# Patient Record
Sex: MALE | Race: WHITE | NOT HISPANIC OR LATINO | Employment: OTHER | URBAN - NONMETROPOLITAN AREA
[De-identification: names, ages, dates, MRNs, and addresses within clinical notes are randomized per-mention and may not be internally consistent; named-entity substitution may affect disease eponyms.]

---

## 2017-01-11 ENCOUNTER — OFFICE VISIT (OUTPATIENT)
Dept: PSYCHIATRY | Facility: CLINIC | Age: 52
End: 2017-01-11

## 2017-01-11 VITALS
HEIGHT: 71 IN | SYSTOLIC BLOOD PRESSURE: 159 MMHG | DIASTOLIC BLOOD PRESSURE: 100 MMHG | HEART RATE: 74 BPM | BODY MASS INDEX: 25.48 KG/M2 | WEIGHT: 182 LBS

## 2017-01-11 DIAGNOSIS — F33.1 MAJOR DEPRESSIVE DISORDER, RECURRENT EPISODE, MODERATE (HCC): Primary | ICD-10-CM

## 2017-01-11 DIAGNOSIS — F60.9 PERSONALITY DISORDER, UNSPECIFIED (HCC): ICD-10-CM

## 2017-01-11 DIAGNOSIS — F43.10 POST TRAUMATIC STRESS DISORDER (PTSD): ICD-10-CM

## 2017-01-11 DIAGNOSIS — F41.1 GENERALIZED ANXIETY DISORDER: ICD-10-CM

## 2017-01-11 PROCEDURE — 99213 OFFICE O/P EST LOW 20 MIN: CPT | Performed by: NURSE PRACTITIONER

## 2017-01-11 RX ORDER — BUSPIRONE HYDROCHLORIDE 5 MG/1
5 TABLET ORAL 2 TIMES DAILY
Qty: 60 TABLET | Refills: 1 | Status: SHIPPED | OUTPATIENT
Start: 2017-01-11 | End: 2017-02-08 | Stop reason: SDUPTHER

## 2017-01-11 RX ORDER — DOXEPIN HYDROCHLORIDE 50 MG/1
50 CAPSULE ORAL NIGHTLY
Qty: 30 CAPSULE | Refills: 1 | Status: SHIPPED | OUTPATIENT
Start: 2017-01-11 | End: 2017-02-08 | Stop reason: SDUPTHER

## 2017-01-11 RX ORDER — PAROXETINE 10 MG/1
10 TABLET, FILM COATED ORAL DAILY
Qty: 30 TABLET | Refills: 1 | Status: SHIPPED | OUTPATIENT
Start: 2017-01-11 | End: 2017-02-08 | Stop reason: SDUPTHER
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
## 2017-01-11 NOTE — MR AVS SNAPSHOT
Marilee Taylor   1/11/2017 8:30 AM   Office Visit    Dept Phone:  [phone]   Encounter #:  [id number]    Provider:  BJORN Coello   Department:  [facility]                Your Full Care Plan              Where to Get Your Medications      These medications were sent to [facility] [address] [place] [address] - [address] AT [facility] [address] - [phone] PH - [phone]   [address], [facility] [address]     Phone:  [phone]     busPIRone 5 MG tablet    doxepin 50 MG capsule    PARoxetine 10 MG tablet            Your Updated Medication List          This list is accurate as of: 1/11/17  9:21 AM.  Always use your most recent med list.                albuterol 108 (90 BASE) MCG/ACT inhaler   Commonly known as:  PROVENTIL HFA;VENTOLIN HFA       atorvastatin 40 MG tablet   Commonly known as:  LIPITOR   Take 1 tablet by mouth daily.       busPIRone 5 MG tablet   Commonly known as:  BUSPAR   Take 1 tablet by mouth 2 (Two) Times a Day.       doxepin 50 MG capsule   Commonly known as:  SINEquan   Take 1 capsule by mouth Every Night.       fenofibrate 54 MG tablet   Commonly known as:  TRICOR   Take 1 tablet by mouth daily. For:  Hyperlipidemia.       fish oil 1000 MG capsule capsule   Take 2 capsules by mouth 2 (two) times a day. For:  Hyperlipidemia.       losartan 50 MG tablet   Commonly known as:  COZAAR   Take 1.5 tablets by mouth daily. For:  Hypertension.       omeprazole 20 MG capsule   Commonly known as:  priLOSEC   Take 1 capsule by mouth 2 (two) times a day. For:  Esophageal reflux.       PARoxetine 10 MG tablet   Commonly known as:  PAXIL   Take 1 tablet by mouth Daily.       vitamin D 12659 UNITS capsule capsule   Commonly known as:  ERGOCALCIFEROL   Take 1 capsule by mouth 1 (one) time per week. For:  Vitamin d deficiency.               You Were Diagnosed With        Codes Comments    Major  "depressive disorder, recurrent episode, moderate    -  Primary ICD-10-CM: F33.1  ICD-9-CM: 296.32     Generalized anxiety disorder     ICD-10-CM: F41.1  ICD-9-CM: 300.02     Post traumatic stress disorder (PTSD)     ICD-10-CM: F43.10  ICD-9-CM: 309.81     Personality disorder, unspecified     ICD-10-CM: F60.9  ICD-9-CM: 301.9       Instructions     None    Patient Instructions History      Upcoming Appointments     Visit Type Date Time Department    MEDICINE CHECK 2017  8:30 AM [facility]    MEDICINE CHECK 2017  8:45 AM [facility]      MyChart Signup     [facility] [employer] allows you to send messages to your doctor, view your test results, renew your prescriptions, schedule appointments, and more. To sign up, go to Hello Chair and click on the Sign Up Now link in the New User? box. Enter your [employer] Activation Code exactly as it appears below along with the last four digits of your Social Security Number and your Date of Birth () to complete the sign-up process. If you do not sign up before the expiration date, you must request a new code.    [employer] Activation Code: [email or web address]  Expires: 2017  9:21 AM    If you have questions, you can email [email or web address] or call [phone] to talk to our Content [employer] staff. Remember, [employer] is NOT to be used for urgent needs. For medical emergencies, dial 911.               Other Info from Your Visit           Your Appointments     2017  8:45 AM EST   Medicine Check with BJORN Coello   [facility] (--)    [address] [facility] [address] [place] [address]   [phone]              Allergies     Demerol Hcl [Meperidine]      Levaquin [Levofloxacin]      Morphine And Related      Prozac [Fluoxetine Hcl]        Vital Signs     Blood Pressure Pulse Height Weight Body Mass Index Smoking Status    159/100 74 71\" (180.3 cm) 182 lb (82.6 kg) 25.38 kg/m2 Former Smoker      Problems and " Diagnoses Noted     Mood problem    -  Primary    Generalized anxiety disorder        Post traumatic stress disorder (PTSD)        Personality disorder

## 2017-01-12 ENCOUNTER — OFFICE VISIT (OUTPATIENT)
Dept: PSYCHIATRY | Facility: CLINIC | Age: 52
End: 2017-01-12

## 2017-01-12 DIAGNOSIS — F60.9 PERSONALITY DISORDER, UNSPECIFIED (HCC): ICD-10-CM

## 2017-01-12 DIAGNOSIS — F43.10 POST TRAUMATIC STRESS DISORDER (PTSD): ICD-10-CM

## 2017-01-12 DIAGNOSIS — F33.41 RECURRENT MAJOR DEPRESSION IN PARTIAL REMISSION (HCC): Primary | ICD-10-CM

## 2017-01-12 PROCEDURE — 90834 PSYTX W PT 45 MINUTES: CPT | Performed by: COUNSELOR

## 2017-01-12 NOTE — MR AVS SNAPSHOT
Marilee Taylor   1/12/2017 11:15 AM   Appointment    Dept Phone:  [phone]   Encounter #:  [id number]    Provider:  Louisa Shah [facility]   Department:  [facility]                Your Full Care Plan              Your Updated Medication List          This list is accurate as of: 1/12/17 11:51 AM.  Always use your most recent med list.                albuterol 108 (90 BASE) MCG/ACT inhaler   Commonly known as:  PROVENTIL HFA;VENTOLIN HFA       atorvastatin 40 MG tablet   Commonly known as:  LIPITOR   Take 1 tablet by mouth daily.       busPIRone 5 MG tablet   Commonly known as:  BUSPAR   Take 1 tablet by mouth 2 (Two) Times a Day.       doxepin 50 MG capsule   Commonly known as:  SINEquan   Take 1 capsule by mouth Every Night.       fenofibrate 54 MG tablet   Commonly known as:  TRICOR   Take 1 tablet by mouth daily. For:  Hyperlipidemia.       fish oil 1000 MG capsule capsule   Take 2 capsules by mouth 2 (two) times a day. For:  Hyperlipidemia.       losartan 50 MG tablet   Commonly known as:  COZAAR   Take 1.5 tablets by mouth daily. For:  Hypertension.       omeprazole 20 MG capsule   Commonly known as:  priLOSEC   Take 1 capsule by mouth 2 (two) times a day. For:  Esophageal reflux.       PARoxetine 10 MG tablet   Commonly known as:  PAXIL   Take 1 tablet by mouth Daily.       vitamin D 63452 UNITS capsule capsule   Commonly known as:  ERGOCALCIFEROL   Take 1 capsule by mouth 1 (one) time per week. For:  Vitamin d deficiency.               Instructions     None    Patient Instructions History      Upcoming Appointments     Visit Type Date Time Department    PSYCHOTHERAPY 1/12/2017 11:15 AM [facility]    MEDICINE CHECK 2/8/2017  8:45 AM [facility]    PSYCHOTHERAPY 2/13/2017 10:15 AM [facility]      MyChart Signup     [facility] [employer] System[employer] allows you to send messages to your doctor, view your test results, renew your prescriptions,  schedule appointments, and more. To sign up, go to [employer] and click on the Sign Up Now link in the New User? box. Enter your eÃ‡ift Activation Code exactly as it appears below along with the last four digits of your Social Security Number and your Date of Birth () to complete the sign-up process. If you do not sign up before the expiration date, you must request a new code.    eÃ‡ift Activation Code: [email or web address]  Expires: 2017  9:21 AM    If you have questions, you can email [email or web address] or call [phone] to talk to our eÃ‡ift staff. Remember, eÃ‡ift is NOT to be used for urgent needs. For medical emergencies, dial 911.               Other Info from Your Visit           Your Appointments     2017  8:45 AM EST   Medicine Check with BJORN Coello   [facility] (--)    [address]  [place] [address]   [phone]            2017 10:15 AM EST   Psychotherapy with Louisa Shah [facility] BEHAVIORAL HE[facility] (--)    [address]  [place] [address]   [phone]              Allergies     Demerol Hcl [Meperidine]      Levaquin [Levofloxacin]      Morphine And Related      Prozac [Fluoxetine Hcl]        Vital Signs     Smoking Status                   Former Smoker
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
## 2017-02-08 ENCOUNTER — OFFICE VISIT (OUTPATIENT)
Dept: PSYCHIATRY | Facility: CLINIC | Age: 52
End: 2017-02-08

## 2017-02-08 VITALS
WEIGHT: 188 LBS | DIASTOLIC BLOOD PRESSURE: 100 MMHG | BODY MASS INDEX: 26.32 KG/M2 | HEIGHT: 71 IN | HEART RATE: 80 BPM | SYSTOLIC BLOOD PRESSURE: 145 MMHG

## 2017-02-08 DIAGNOSIS — F33.1 MAJOR DEPRESSIVE DISORDER, RECURRENT EPISODE, MODERATE (HCC): Primary | ICD-10-CM

## 2017-02-08 DIAGNOSIS — F43.10 POST TRAUMATIC STRESS DISORDER (PTSD): ICD-10-CM

## 2017-02-08 DIAGNOSIS — F60.9 PERSONALITY DISORDER, UNSPECIFIED (HCC): ICD-10-CM

## 2017-02-08 DIAGNOSIS — F41.1 GENERALIZED ANXIETY DISORDER: ICD-10-CM

## 2017-02-08 PROCEDURE — 99213 OFFICE O/P EST LOW 20 MIN: CPT | Performed by: NURSE PRACTITIONER

## 2017-02-08 RX ORDER — DOXEPIN HYDROCHLORIDE 50 MG/1
50 CAPSULE ORAL NIGHTLY
Qty: 30 CAPSULE | Refills: 1 | Status: SHIPPED | OUTPATIENT
Start: 2017-02-08 | End: 2017-05-02 | Stop reason: SDUPTHER

## 2017-02-08 RX ORDER — BUSPIRONE HYDROCHLORIDE 5 MG/1
5 TABLET ORAL 2 TIMES DAILY
Qty: 60 TABLET | Refills: 1 | Status: SHIPPED | OUTPATIENT
Start: 2017-02-08 | End: 2017-05-02 | Stop reason: SDUPTHER

## 2017-02-08 RX ORDER — PAROXETINE 10 MG/1
10 TABLET, FILM COATED ORAL DAILY
Qty: 30 TABLET | Refills: 1 | Status: SHIPPED | OUTPATIENT
Start: 2017-02-08 | End: 2017-05-02 | Stop reason: SDUPTHER
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
## 2017-02-13 ENCOUNTER — OFFICE VISIT (OUTPATIENT)
Dept: PSYCHIATRY | Facility: CLINIC | Age: 52
End: 2017-02-13

## 2017-02-13 DIAGNOSIS — F60.9 PERSONALITY DISORDER, UNSPECIFIED (HCC): ICD-10-CM

## 2017-02-13 DIAGNOSIS — F41.1 GENERALIZED ANXIETY DISORDER: ICD-10-CM

## 2017-02-13 DIAGNOSIS — F43.10 POST TRAUMATIC STRESS DISORDER (PTSD): ICD-10-CM

## 2017-02-13 DIAGNOSIS — F33.1 MAJOR DEPRESSIVE DISORDER, RECURRENT EPISODE, MODERATE (HCC): Primary | ICD-10-CM

## 2017-02-13 PROCEDURE — 90834 PSYTX W PT 45 MINUTES: CPT | Performed by: COUNSELOR
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
## 2017-05-02 ENCOUNTER — OFFICE VISIT (OUTPATIENT)
Dept: PSYCHIATRY | Facility: CLINIC | Age: 52
End: 2017-05-02

## 2017-05-02 VITALS
WEIGHT: 191 LBS | BODY MASS INDEX: 26.74 KG/M2 | DIASTOLIC BLOOD PRESSURE: 101 MMHG | SYSTOLIC BLOOD PRESSURE: 160 MMHG | HEART RATE: 73 BPM | HEIGHT: 71 IN

## 2017-05-02 DIAGNOSIS — F43.10 POST TRAUMATIC STRESS DISORDER (PTSD): ICD-10-CM

## 2017-05-02 DIAGNOSIS — F60.9 PERSONALITY DISORDER, UNSPECIFIED (HCC): ICD-10-CM

## 2017-05-02 DIAGNOSIS — F33.1 MAJOR DEPRESSIVE DISORDER, RECURRENT EPISODE, MODERATE (HCC): Primary | ICD-10-CM

## 2017-05-02 DIAGNOSIS — F41.1 GENERALIZED ANXIETY DISORDER: ICD-10-CM

## 2017-05-02 PROCEDURE — 99213 OFFICE O/P EST LOW 20 MIN: CPT | Performed by: NURSE PRACTITIONER

## 2017-05-02 RX ORDER — PAROXETINE 10 MG/1
10 TABLET, FILM COATED ORAL DAILY
Qty: 30 TABLET | Refills: 1 | Status: SHIPPED | OUTPATIENT
Start: 2017-05-02 | End: 2017-06-27 | Stop reason: SDUPTHER

## 2017-05-02 RX ORDER — BUSPIRONE HYDROCHLORIDE 5 MG/1
5 TABLET ORAL 2 TIMES DAILY
Qty: 60 TABLET | Refills: 1 | Status: SHIPPED | OUTPATIENT
Start: 2017-05-02 | End: 2017-06-27 | Stop reason: SDUPTHER

## 2017-05-02 RX ORDER — DOXEPIN HYDROCHLORIDE 50 MG/1
50 CAPSULE ORAL NIGHTLY
Qty: 30 CAPSULE | Refills: 1 | Status: SHIPPED | OUTPATIENT
Start: 2017-05-02 | End: 2017-06-27 | Stop reason: SDUPTHER

## 2017-06-07 ENCOUNTER — OFFICE VISIT (OUTPATIENT)
Dept: PSYCHIATRY | Facility: CLINIC | Age: 52
End: 2017-06-07

## 2017-06-07 DIAGNOSIS — F33.1 MAJOR DEPRESSIVE DISORDER, RECURRENT EPISODE, MODERATE (HCC): Primary | ICD-10-CM

## 2017-06-07 DIAGNOSIS — F43.10 POST TRAUMATIC STRESS DISORDER (PTSD): ICD-10-CM

## 2017-06-07 DIAGNOSIS — F41.1 GENERALIZED ANXIETY DISORDER: ICD-10-CM

## 2017-06-07 DIAGNOSIS — F60.9 PERSONALITY DISORDER, UNSPECIFIED (HCC): ICD-10-CM

## 2017-06-07 PROCEDURE — 90834 PSYTX W PT 45 MINUTES: CPT | Performed by: SOCIAL WORKER

## 2017-06-07 NOTE — PROGRESS NOTES
PROGRESS NOTE  Data:  Marilee Taylor is a 52 y.o. male who met 1:1 with Bg Simmons LCSW, LCADC for regularly scheduled psychotherapy session at [facility] from 7:45 AM to 8:35 AM following referral from MARCELO Morales LCADC due to insurance billing reasons..       HPI: The patient reports he has struggled with depression and anxiety throughout his life and states he has a pattern of discontinuing his medication once he feels better which results in significant suicidal ideation with a history of at least one serious suicide attempt.  The patient reports he is doing somewhat better since restarting medication but states he continues to struggle with depressed mood, irritability, anhedonia, anergia, difficulty concentrating, periods of hopelessness, and periods of social isolation.  Patient also reports he continues to struggle with periodic symptoms of posttraumatic stress disorder including hypervigilance, periods of unwanted thoughts, flashbacks.  Patient also reports he continues to have periods of feeling on edge, increased heart rate, and urged to escape the situation, feeling overwhelmed, and a sense of impending doom.  Patient rates symptoms of depression at 4, anxiety at a 4, and PTSD at a 3 on a scale of 1-10 with 10 being most severe.  Patient reports he continues to adhere to medication regimen as prescribed.  The patient adamantly and convincingly denies current suicidal ideation.  The patient vehemently denies any substance use.      Clinical Maneuvering/Intervention:   Assisted patient in processing above session content; acknowledged and normalized patient’s thoughts, feelings, and concerns.  Discussed the therapist/patient relationship and explain the parameters and limitations of relative confidentiality.  Also discussed the importance of regular attendance, active participation, and honesty to the treatment process.  Assisted the patient in identifying and developing appropriate coping  mechanisms to decrease severity and frequency of symptoms including actively seeking enjoyable activities he can engage in on a regular basis to reduce idle time and social isolation, relaxation techniques, and challenging faulty, irrational thoughts with factual counter arguments.  Also allowed the patient to discuss traumatic events during his childhood in a safe, supportive environment.  Provided unconditional positive regard.    Allowed patient to freely discuss issues without interruption or judgment. Provided safe, confidential environment to facilitate the development of positive therapeutic relationship and encourage open, honest communication. Assisted patient in identifying risk factors which would indicate the need for higher level of care including thoughts to harm self or others and/or self-harming behavior and encouraged patient to contact this office, call 911, or present to the nearest emergency room should any of these events occur. Discussed crisis intervention services and means to access.  Patient adamantly and convincingly denies current suicidal or homicidal ideation or perceptual disturbance.    Assessment     Diagnoses and all orders for this visit:    Major depressive disorder, recurrent episode, moderate    Generalized anxiety disorder    Post traumatic stress disorder (PTSD)    Personality disorder, unspecified             Mental Status Exam  Hygiene:  good  Dress:  casual  Attitude:  Cooperative  Motor Activity:  Appropriate  Speech:  Normal  Mood:  depressed  Affect:  depressed  Thought Processes:  Linear  Thought Content:  normal  Suicidal Thoughts:  denies  Homicidal Thoughts:  denies  Crisis Safety Plan: yes, to come to the emergency room.  Hallucinations:  denies    Patient's Support Network Includes:  significant other    Plan         The patient will continue in individual outpatient psychotherapy sessions every 3-4 weeks at the [facility] and pharmacotherapy as scheduled.   Patient will adhere to medication regimen as prescribed and report any side effects. Patient will contact this office, call 911 or present to the nearest emergency room should suicidal or homicidal ideations occur. Provide Cognitive Behavioral Therapy and Solution Focused Therapy to improve functioning, maintain stability, and avoid decompensation and the need for higher level of care.          Return in about 3 weeks (around 06/28/2017) for Next scheduled follow up.    Bg Simmons, PRINCE,[facility]

## 2017-06-27 ENCOUNTER — OFFICE VISIT (OUTPATIENT)
Dept: PSYCHIATRY | Facility: CLINIC | Age: 52
End: 2017-06-27

## 2017-06-27 VITALS
HEIGHT: 71 IN | DIASTOLIC BLOOD PRESSURE: 96 MMHG | HEART RATE: 73 BPM | BODY MASS INDEX: 27.3 KG/M2 | WEIGHT: 195 LBS | SYSTOLIC BLOOD PRESSURE: 149 MMHG

## 2017-06-27 DIAGNOSIS — F41.1 GENERALIZED ANXIETY DISORDER: ICD-10-CM

## 2017-06-27 DIAGNOSIS — F43.10 POST TRAUMATIC STRESS DISORDER (PTSD): ICD-10-CM

## 2017-06-27 DIAGNOSIS — F33.1 MAJOR DEPRESSIVE DISORDER, RECURRENT EPISODE, MODERATE (HCC): Primary | ICD-10-CM

## 2017-06-27 DIAGNOSIS — F60.9 PERSONALITY DISORDER, UNSPECIFIED (HCC): ICD-10-CM

## 2017-06-27 PROCEDURE — 99213 OFFICE O/P EST LOW 20 MIN: CPT | Performed by: NURSE PRACTITIONER

## 2017-06-27 RX ORDER — BUSPIRONE HYDROCHLORIDE 5 MG/1
5 TABLET ORAL 2 TIMES DAILY
Qty: 60 TABLET | Refills: 1 | Status: SHIPPED | OUTPATIENT
Start: 2017-06-27 | End: 2017-09-11 | Stop reason: SDUPTHER

## 2017-06-27 RX ORDER — PAROXETINE 10 MG/1
10 TABLET, FILM COATED ORAL DAILY
Qty: 30 TABLET | Refills: 1 | Status: SHIPPED | OUTPATIENT
Start: 2017-06-27 | End: 2017-09-11 | Stop reason: SDUPTHER

## 2017-06-27 RX ORDER — DOXEPIN HYDROCHLORIDE 50 MG/1
50 CAPSULE ORAL NIGHTLY
Qty: 30 CAPSULE | Refills: 1 | Status: SHIPPED | OUTPATIENT
Start: 2017-06-27 | End: 2017-09-11 | Stop reason: SDUPTHER

## 2017-06-27 NOTE — PROGRESS NOTES
Subjective   Marilee Taylor is a 52 y.o. male who is here today for medication management follow up at the [facility], he presented to his appointment on time.      Chief Complaint:  Follow-up     History of Present Illness   Patient states that he is doing well with his current medications, denies any SE or problems.  He denies any issues with his depression, doing better with his anxiety but was able to get a new [place] which has helped.  He states that he went through a spell of migraine for about 2 weeks.  He shares that he is getting about 4 hours per night- takes about 30 minute naps during the day- watches TV at night (Gilbert Steiner).  Appetite is good with weight increase.  He has been working out and gaining weight.  Denies any AV hallucinations, denies any SI/HI.  He is involved with [employer] which has been good for him.        The following portions of the patient's history were reviewed and updated as appropriate: allergies, current medications, past family history, past medical history, past social history, past surgical history and problem list.    Review of Systems   Constitutional: Negative for appetite change, chills, diaphoresis, fatigue, fever and unexpected weight change.   HENT: Negative for hearing loss, sore throat, trouble swallowing and voice change.    Eyes: Negative for photophobia and visual disturbance.   Respiratory: Negative for cough, chest tightness and shortness of breath.    Cardiovascular: Negative for chest pain and palpitations.        Elevated BP   Gastrointestinal: Negative for abdominal pain, constipation, nausea and vomiting.   Endocrine: Negative for cold intolerance and heat intolerance.   Genitourinary: Negative for dysuria and frequency.   Musculoskeletal: Negative for arthralgias, back pain, joint swelling and neck stiffness.        Knee pain    Skin: Negative for color change and wound.   Allergic/Immunologic: Negative for environmental allergies and immunocompromised  "state.   Neurological: Negative for dizziness, tremors, seizures, syncope, weakness, light-headedness and headaches.   Hematological: Negative for adenopathy. Does not bruise/bleed easily.       Objective   Physical Exam   Constitutional: He appears well-developed and well-nourished. No distress.   Neurological: He is alert. Coordination and gait normal.   Vitals reviewed.    Blood pressure 149/96, pulse 73, height 71\" (180.3 cm), weight 195 lb (88.5 kg).    Allergies   Allergen Reactions   • Demerol Hcl [Meperidine]    • Levaquin [Levofloxacin]    • Morphine And Related    • Prozac [Fluoxetine Hcl]        Current Medications:   Current Outpatient Prescriptions   Medication Sig Dispense Refill   • albuterol (PROVENTIL HFA;VENTOLIN HFA) 108 (90 BASE) MCG/ACT inhaler Inhale 1-2 puffs. Every 4-6 hours as needed and as directed.     • atorvastatin (LIPITOR) 40 MG tablet Take 1 tablet by mouth daily. 90 tablet 0   • busPIRone (BUSPAR) 5 MG tablet Take 1 tablet by mouth 2 (Two) Times a Day. 60 tablet 1   • doxepin (SINEquan) 50 MG capsule Take 1 capsule by mouth Every Night. 30 capsule 1   • losartan (COZAAR) 50 MG tablet Take 1.5 tablets by mouth daily. For:  Hypertension. 45 tablet 5   • omeprazole (PriLOSEC) 20 MG capsule Take 1 capsule by mouth 2 (two) times a day. For:  Esophageal reflux. 90 capsule 1   • PARoxetine (PAXIL) 10 MG tablet Take 1 tablet by mouth Daily. 30 tablet 1   • vitamin D (ERGOCALCIFEROL) 14214 UNITS capsule capsule Take 1 capsule by mouth 1 (one) time per week. For:  Vitamin d deficiency. 12 capsule 0     No current facility-administered medications for this visit.        Mental Status Exam:   Hygiene:   fair  Cooperation:  Cooperative  Eye Contact:  Good  Psychomotor Behavior:  Appropriate  Affect:  Blunted  Hopelessness: Denies  Speech:  Normal  Thought Process:  Goal directed and Linear  Thought Content:  Normal  Suicidal:  None  Homicidal:  None  Hallucinations:  None  Delusion:  " None  Memory:  Intact  Orientation:  Person, Place, Time and Situation  Reliability:  fair  Insight:  Fair  Judgement:  Fair  Impulse Control:  Fair  Physical/Medical Issues:  No     Assessment/Plan     Major depressive disorder, recurrent episode, moderate    Generalized anxiety disorder    Post traumatic stress disorder (PTSD)    Personality disorder, unspecified    Other orders  -     busPIRone (BUSPAR) 5 MG tablet; Take 1 tablet by mouth 2 (Two) Times a Day.  -     doxepin (SINEquan) 50 MG capsule; Take 1 capsule by mouth Every Night.  -     PARoxetine (PAXIL) 10 MG tablet; Take 1 tablet by mouth Daily.      Discused medications options.  Continue current medications.  Discussed the risks, beneefits, and side effects of the medications; patient ackowledged and verbally consentedd.  Patient is aware to call the [facility] with any worsening of symptoms.  Patient is agreeable to call 911 or go to the nearest ER should he/she begin having SI/HI.      Return in 8 weeks

## 2017-07-19 ENCOUNTER — OFFICE VISIT (OUTPATIENT)
Dept: PSYCHIATRY | Facility: CLINIC | Age: 52
End: 2017-07-19

## 2017-07-19 DIAGNOSIS — F60.9 PERSONALITY DISORDER, UNSPECIFIED (HCC): ICD-10-CM

## 2017-07-19 DIAGNOSIS — F41.1 GENERALIZED ANXIETY DISORDER: ICD-10-CM

## 2017-07-19 DIAGNOSIS — F43.10 POST TRAUMATIC STRESS DISORDER (PTSD): ICD-10-CM

## 2017-07-19 DIAGNOSIS — F33.1 MAJOR DEPRESSIVE DISORDER, RECURRENT EPISODE, MODERATE (HCC): Primary | ICD-10-CM

## 2017-07-19 NOTE — TREATMENT PLAN
Multi-Disciplinary Problems (from Behavioral Health Treatment Plan)    Active Problems     Problem: Anxiety (Priority: --)  (Start Date: 07/19/17) (Resolve Date: --)    Problem Details:  The patient self-scales this problem as a 5 with 10 being the worst.         Goal Start Date End Date    Patient will develop and implement behavioral and cognitive strategies to reduce anxiety and irrational fears. 07/19/17 --    Goal Details:  Progress toward goal:  Not appropriate to rate progress toward goal since this is the initial treatment plan.         Goal Intervention Frequency Start Date End Date    Help patient explore past emotional issues in relation to present anxiety. Q3 Weeks 07/19/17 07/19/18    Intervention Details:  Duration of treatment until remission of symptoms.         Goal Intervention Frequency Start Date End Date    Help patient develop an awareness of their cognitive and physical responses to anxiety. Q3 Weeks 07/19/17 07/19/18    Intervention Details:  Duration of treatment until remission of symptoms.               Problem: Depression (Priority: --)  (Start Date: 07/19/17) (Resolve Date: --)    Problem Details:  The patient self-scales this problem as a 5 with 10 being the worst.         Goal Start Date End Date    Patient will demonstrate the ability to initiate new constructive life skills outside of sessions on a consistent basis. 07/19/17 --    Goal Details:  Progress toward goal:  Not appropriate to rate progress toward goal since this is the initial treatment plan.         Goal Intervention Frequency Start Date End Date    Assist patient in setting attainable activities of daily living goals. Q3 Weeks 07/19/17 07/19/18    Intervention Details:  Patient will find activities he can engage in on a regular basis to reduce idle time and social isolation. Patient will continue to use his motorcycle as a coping skill. Patient will focus on healthy skills of daily living.        Goal Intervention  Frequency Start Date End Date    Provide education about depression Q3 Weeks 07/19/17 07/19/18    Intervention Details:  Duration of treatment until remission of symptoms.         Goal Intervention Frequency Start Date End Date    Assist patient in developing healthy coping strategies. Q3 Weeks 07/19/17 07/19/18    Intervention Details:  Duration of treatment until remission of symptoms.               Problem: Post Traumatic Stress (Priority: --)  (Start Date: 07/19/17) (Resolve Date: --)    Problem Details:  The patient self-scales this problem as a 6 with 10 being the worst.         Goal Start Date End Date    Patient will process and move through trauma in a way that improves self regard and the patients ability to function optimally in the world around them. 07/19/17 --    Goal Details:  Progress toward goal:  Not appropriate to rate progress toward goal since this is the initial treatment plan.         Goal Intervention Frequency Start Date End Date    Assist patient in identifying ways that trauma has negatively impacted their view of themselves and the world. Q3 Weeks 07/19/17 07/19/18    Intervention Details:  Duration of treatment until remission of symptoms.         Goal Intervention Frequency Start Date End Date    Process trauma in the context of the safe session environment. Q3 Weeks 07/19/17 07/19/18    Intervention Details:  Duration of treatment until remission of symptoms.         Goal Intervention Frequency Start Date End Date    Develop a plan of behavior changes that will reduce the stress of the trauma. Q3 Weeks 07/19/17 07/19/18    Intervention Details:  Duration of treatment until remission of symptoms.                            I have discussed and reviewed this treatment plan with the patient.  It has been printed for signatures.
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
## 2017-09-11 ENCOUNTER — OFFICE VISIT (OUTPATIENT)
Dept: PSYCHIATRY | Facility: CLINIC | Age: 52
End: 2017-09-11

## 2017-09-11 VITALS
WEIGHT: 198.4 LBS | DIASTOLIC BLOOD PRESSURE: 86 MMHG | SYSTOLIC BLOOD PRESSURE: 141 MMHG | HEIGHT: 71 IN | BODY MASS INDEX: 27.77 KG/M2 | HEART RATE: 85 BPM

## 2017-09-11 DIAGNOSIS — F41.1 GENERALIZED ANXIETY DISORDER: ICD-10-CM

## 2017-09-11 DIAGNOSIS — F43.10 POST TRAUMATIC STRESS DISORDER (PTSD): ICD-10-CM

## 2017-09-11 DIAGNOSIS — F33.1 MAJOR DEPRESSIVE DISORDER, RECURRENT EPISODE, MODERATE (HCC): Primary | ICD-10-CM

## 2017-09-11 DIAGNOSIS — F60.9 PERSONALITY DISORDER, UNSPECIFIED (HCC): ICD-10-CM

## 2017-09-11 PROCEDURE — 99213 OFFICE O/P EST LOW 20 MIN: CPT | Performed by: NURSE PRACTITIONER

## 2017-09-11 RX ORDER — PAROXETINE 10 MG/1
10 TABLET, FILM COATED ORAL DAILY
Qty: 30 TABLET | Refills: 1 | Status: SHIPPED | OUTPATIENT
Start: 2017-09-11 | End: 2018-01-22 | Stop reason: SDUPTHER

## 2017-09-11 RX ORDER — DOXEPIN HYDROCHLORIDE 50 MG/1
50 CAPSULE ORAL NIGHTLY
Qty: 30 CAPSULE | Refills: 1 | Status: SHIPPED | OUTPATIENT
Start: 2017-09-11 | End: 2018-01-30 | Stop reason: SDUPTHER

## 2017-09-11 RX ORDER — BUSPIRONE HYDROCHLORIDE 5 MG/1
5 TABLET ORAL 2 TIMES DAILY
Qty: 60 TABLET | Refills: 1 | Status: SHIPPED | OUTPATIENT
Start: 2017-09-11 | End: 2018-01-30 | Stop reason: SDUPTHER
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
## 2017-09-27 ENCOUNTER — OFFICE VISIT (OUTPATIENT)
Dept: FAMILY MEDICINE CLINIC | Facility: CLINIC | Age: 52
End: 2017-09-27

## 2017-09-27 VITALS
SYSTOLIC BLOOD PRESSURE: 165 MMHG | OXYGEN SATURATION: 98 % | WEIGHT: 190 LBS | HEIGHT: 71 IN | DIASTOLIC BLOOD PRESSURE: 96 MMHG | BODY MASS INDEX: 26.6 KG/M2 | HEART RATE: 75 BPM

## 2017-09-27 DIAGNOSIS — M79.89 SOFT TISSUE MASS: ICD-10-CM

## 2017-09-27 DIAGNOSIS — F32.A DEPRESSION, UNSPECIFIED DEPRESSION TYPE: ICD-10-CM

## 2017-09-27 DIAGNOSIS — R53.83 OTHER FATIGUE: ICD-10-CM

## 2017-09-27 DIAGNOSIS — R10.31 RLQ ABDOMINAL PAIN: ICD-10-CM

## 2017-09-27 DIAGNOSIS — E55.9 VITAMIN D DEFICIENCY: ICD-10-CM

## 2017-09-27 DIAGNOSIS — I10 ESSENTIAL HYPERTENSION: ICD-10-CM

## 2017-09-27 DIAGNOSIS — E53.8 VITAMIN B12 DEFICIENCY: ICD-10-CM

## 2017-09-27 DIAGNOSIS — K21.9 GASTROESOPHAGEAL REFLUX DISEASE, ESOPHAGITIS PRESENCE NOT SPECIFIED: ICD-10-CM

## 2017-09-27 DIAGNOSIS — R68.82 DECREASED LIBIDO: ICD-10-CM

## 2017-09-27 DIAGNOSIS — E53.8 COBALAMIN DEFICIENCY: ICD-10-CM

## 2017-09-27 DIAGNOSIS — E78.5 HYPERLIPIDEMIA, UNSPECIFIED HYPERLIPIDEMIA TYPE: ICD-10-CM

## 2017-09-27 DIAGNOSIS — R35.1 NOCTURIA: ICD-10-CM

## 2017-09-27 DIAGNOSIS — R41.3 MEMORY LOSS: Primary | ICD-10-CM

## 2017-09-27 LAB
25(OH)D3 SERPL-MCNC: 16 NG/ML
ALBUMIN SERPL-MCNC: 4.7 G/DL (ref 3.5–5)
ALBUMIN/GLOB SERPL: 1.7 G/DL (ref 1.5–2.5)
ALP SERPL-CCNC: 72 U/L (ref 40–129)
ALT SERPL W P-5'-P-CCNC: 40 U/L (ref 10–44)
ANION GAP SERPL CALCULATED.3IONS-SCNC: 8.3 MMOL/L (ref 3.6–11.2)
AST SERPL-CCNC: 26 U/L (ref 10–34)
BASOPHILS # BLD AUTO: 0.02 10*3/MM3 (ref 0–0.3)
BASOPHILS NFR BLD AUTO: 0.3 % (ref 0–2)
BILIRUB SERPL-MCNC: 0.4 MG/DL (ref 0.2–1.8)
BUN BLD-MCNC: 12 MG/DL (ref 7–21)
BUN/CREAT SERPL: 13.6 (ref 7–25)
CALCIUM SPEC-SCNC: 9.8 MG/DL (ref 7.7–10)
CHLORIDE SERPL-SCNC: 110 MMOL/L (ref 99–112)
CHOLEST SERPL-MCNC: 237 MG/DL (ref 0–200)
CO2 SERPL-SCNC: 25.7 MMOL/L (ref 24.3–31.9)
CREAT BLD-MCNC: 0.88 MG/DL (ref 0.43–1.29)
DEPRECATED RDW RBC AUTO: 41 FL (ref 37–54)
EOSINOPHIL # BLD AUTO: 0.17 10*3/MM3 (ref 0–0.7)
EOSINOPHIL NFR BLD AUTO: 2.9 % (ref 0–5)
ERYTHROCYTE [DISTWIDTH] IN BLOOD BY AUTOMATED COUNT: 13.2 % (ref 11.5–14.5)
GFR SERPL CREATININE-BSD FRML MDRD: 90 ML/MIN/1.73
GLOBULIN UR ELPH-MCNC: 2.7 GM/DL
GLUCOSE BLD-MCNC: 95 MG/DL (ref 70–110)
HCT VFR BLD AUTO: 43.1 % (ref 42–52)
HDLC SERPL-MCNC: 33 MG/DL (ref 60–100)
HGB BLD-MCNC: 14.5 G/DL (ref 14–18)
IMM GRANULOCYTES # BLD: 0.03 10*3/MM3 (ref 0–0.03)
IMM GRANULOCYTES NFR BLD: 0.5 % (ref 0–0.5)
LDLC SERPL CALC-MCNC: ABNORMAL MG/DL (ref 0–100)
LDLC/HDLC SERPL: ABNORMAL {RATIO}
LYMPHOCYTES # BLD AUTO: 1.68 10*3/MM3 (ref 1–3)
LYMPHOCYTES NFR BLD AUTO: 28.2 % (ref 21–51)
MAGNESIUM SERPL-MCNC: 2.2 MG/DL (ref 1.7–2.6)
MCH RBC QN AUTO: 28.9 PG (ref 27–33)
MCHC RBC AUTO-ENTMCNC: 33.6 G/DL (ref 33–37)
MCV RBC AUTO: 85.9 FL (ref 80–94)
MONOCYTES # BLD AUTO: 0.54 10*3/MM3 (ref 0.1–0.9)
MONOCYTES NFR BLD AUTO: 9.1 % (ref 0–10)
NEUTROPHILS # BLD AUTO: 3.52 10*3/MM3 (ref 1.4–6.5)
NEUTROPHILS NFR BLD AUTO: 59 % (ref 30–70)
OSMOLALITY SERPL CALC.SUM OF ELEC: 286.4 MOSM/KG (ref 273–305)
PLATELET # BLD AUTO: 226 10*3/MM3 (ref 130–400)
PMV BLD AUTO: 9.8 FL (ref 6–10)
POTASSIUM BLD-SCNC: 3.5 MMOL/L (ref 3.5–5.3)
PROT SERPL-MCNC: 7.4 G/DL (ref 6–8)
PSA SERPL-MCNC: 0.59 NG/ML (ref 0–4)
RBC # BLD AUTO: 5.02 10*6/MM3 (ref 4.7–6.1)
SODIUM BLD-SCNC: 144 MMOL/L (ref 135–153)
TRIGL SERPL-MCNC: 436 MG/DL (ref 0–150)
TSH SERPL DL<=0.05 MIU/L-ACNC: 1.36 MIU/ML (ref 0.55–4.78)
VIT B12 BLD-MCNC: 675 PG/ML (ref 211–911)
VLDLC SERPL-MCNC: ABNORMAL MG/DL
WBC NRBC COR # BLD: 5.96 10*3/MM3 (ref 4.5–12.5)

## 2017-09-27 PROCEDURE — 84153 ASSAY OF PSA TOTAL: CPT | Performed by: NURSE PRACTITIONER

## 2017-09-27 PROCEDURE — 84403 ASSAY OF TOTAL TESTOSTERONE: CPT | Performed by: NURSE PRACTITIONER

## 2017-09-27 PROCEDURE — 80053 COMPREHEN METABOLIC PANEL: CPT | Performed by: NURSE PRACTITIONER

## 2017-09-27 PROCEDURE — 82306 VITAMIN D 25 HYDROXY: CPT | Performed by: NURSE PRACTITIONER

## 2017-09-27 PROCEDURE — 84402 ASSAY OF FREE TESTOSTERONE: CPT | Performed by: NURSE PRACTITIONER

## 2017-09-27 PROCEDURE — 36415 COLL VENOUS BLD VENIPUNCTURE: CPT | Performed by: NURSE PRACTITIONER

## 2017-09-27 PROCEDURE — 82607 VITAMIN B-12: CPT | Performed by: NURSE PRACTITIONER

## 2017-09-27 PROCEDURE — 96372 THER/PROPH/DIAG INJ SC/IM: CPT | Performed by: NURSE PRACTITIONER

## 2017-09-27 PROCEDURE — 80061 LIPID PANEL: CPT | Performed by: NURSE PRACTITIONER

## 2017-09-27 PROCEDURE — 84443 ASSAY THYROID STIM HORMONE: CPT | Performed by: NURSE PRACTITIONER

## 2017-09-27 PROCEDURE — 99214 OFFICE O/P EST MOD 30 MIN: CPT | Performed by: NURSE PRACTITIONER

## 2017-09-27 PROCEDURE — 85025 COMPLETE CBC W/AUTO DIFF WBC: CPT | Performed by: NURSE PRACTITIONER

## 2017-09-27 PROCEDURE — 83735 ASSAY OF MAGNESIUM: CPT | Performed by: NURSE PRACTITIONER

## 2017-09-27 RX ORDER — MELATONIN
1000 DAILY
COMMUNITY
End: 2019-10-29 | Stop reason: SDUPTHER

## 2017-09-27 RX ORDER — CYANOCOBALAMIN 1000 UG/ML
1000 INJECTION, SOLUTION INTRAMUSCULAR; SUBCUTANEOUS
Status: DISCONTINUED | OUTPATIENT
Start: 2017-09-27 | End: 2018-10-23

## 2017-09-27 RX ORDER — ERGOCALCIFEROL 1.25 MG/1
50000 CAPSULE ORAL WEEKLY
Qty: 4 CAPSULE | Refills: 2 | Status: SHIPPED | OUTPATIENT
Start: 2017-09-27 | End: 2018-03-14 | Stop reason: SDUPTHER

## 2017-09-27 RX ORDER — FENOFIBRATE 145 MG/1
145 TABLET, COATED ORAL DAILY
Qty: 30 TABLET | Refills: 5 | Status: SHIPPED | OUTPATIENT
Start: 2017-09-27 | End: 2018-03-13 | Stop reason: SDUPTHER

## 2017-09-27 RX ADMIN — CYANOCOBALAMIN 1000 MCG: 1000 INJECTION, SOLUTION INTRAMUSCULAR; SUBCUTANEOUS at 14:43
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
## 2017-09-30 LAB
TESTOST FREE SERPL-MCNC: 6.1 PG/ML (ref 7.2–24)
TESTOST SERPL-MCNC: 253 NG/DL (ref 264–916)

## 2017-10-03 ENCOUNTER — TELEPHONE (OUTPATIENT)
Dept: FAMILY MEDICINE CLINIC | Facility: CLINIC | Age: 52
End: 2017-10-03

## 2017-10-03 NOTE — TELEPHONE ENCOUNTER
----- Message from BJORN Vincent sent at 10/3/2017  9:40 AM EDT -----  Testosterone level is a little low.  Would he like to be referred to urology for evaluation?  If not, he could try DHEA otc which might help with the testosterone level.      Spoke with patient,he prefers to try DHEA otc.

## 2017-10-05 ENCOUNTER — APPOINTMENT (OUTPATIENT)
Dept: CT IMAGING | Facility: HOSPITAL | Age: 52
End: 2017-10-05

## 2017-10-20 ENCOUNTER — APPOINTMENT (OUTPATIENT)
Dept: CT IMAGING | Facility: HOSPITAL | Age: 52
End: 2017-10-20

## 2018-01-11 RX ORDER — PAROXETINE 10 MG/1
TABLET, FILM COATED ORAL
Qty: 30 TABLET | Refills: 0 | OUTPATIENT
Start: 2018-01-11

## 2018-01-22 RX ORDER — PAROXETINE 10 MG/1
10 TABLET, FILM COATED ORAL DAILY
Qty: 30 TABLET | Refills: 1 | Status: SHIPPED | OUTPATIENT
Start: 2018-01-22 | End: 2018-01-30 | Stop reason: SDUPTHER

## 2018-01-30 ENCOUNTER — OFFICE VISIT (OUTPATIENT)
Dept: PSYCHIATRY | Facility: CLINIC | Age: 53
End: 2018-01-30

## 2018-01-30 VITALS
DIASTOLIC BLOOD PRESSURE: 86 MMHG | HEART RATE: 77 BPM | WEIGHT: 194 LBS | HEIGHT: 71 IN | BODY MASS INDEX: 27.16 KG/M2 | SYSTOLIC BLOOD PRESSURE: 127 MMHG

## 2018-01-30 DIAGNOSIS — F33.1 MAJOR DEPRESSIVE DISORDER, RECURRENT EPISODE, MODERATE (HCC): Primary | ICD-10-CM

## 2018-01-30 DIAGNOSIS — F43.10 POST TRAUMATIC STRESS DISORDER (PTSD): ICD-10-CM

## 2018-01-30 DIAGNOSIS — F41.1 GENERALIZED ANXIETY DISORDER: ICD-10-CM

## 2018-01-30 PROCEDURE — 99214 OFFICE O/P EST MOD 30 MIN: CPT | Performed by: NURSE PRACTITIONER

## 2018-01-30 RX ORDER — PAROXETINE 10 MG/1
10 TABLET, FILM COATED ORAL DAILY
Qty: 30 TABLET | Refills: 1 | Status: SHIPPED | OUTPATIENT
Start: 2018-01-30 | End: 2018-02-13 | Stop reason: SDUPTHER

## 2018-01-30 RX ORDER — DOXEPIN HYDROCHLORIDE 75 MG/1
CAPSULE ORAL
Qty: 60 CAPSULE | Refills: 1 | Status: SHIPPED | OUTPATIENT
Start: 2018-01-30 | End: 2018-03-29 | Stop reason: SDUPTHER

## 2018-01-30 RX ORDER — BUSPIRONE HYDROCHLORIDE 5 MG/1
5 TABLET ORAL 2 TIMES DAILY
Qty: 60 TABLET | Refills: 1 | Status: SHIPPED | OUTPATIENT
Start: 2018-01-30 | End: 2018-03-29 | Stop reason: SDUPTHER
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
## 2018-02-13 ENCOUNTER — OFFICE VISIT (OUTPATIENT)
Dept: PSYCHIATRY | Facility: CLINIC | Age: 53
End: 2018-02-13

## 2018-02-13 VITALS
HEIGHT: 71 IN | SYSTOLIC BLOOD PRESSURE: 156 MMHG | WEIGHT: 195 LBS | HEART RATE: 76 BPM | BODY MASS INDEX: 27.3 KG/M2 | DIASTOLIC BLOOD PRESSURE: 104 MMHG

## 2018-02-13 DIAGNOSIS — F60.9 PERSONALITY DISORDER (HCC): ICD-10-CM

## 2018-02-13 DIAGNOSIS — F41.1 GENERALIZED ANXIETY DISORDER: ICD-10-CM

## 2018-02-13 DIAGNOSIS — F43.10 POST TRAUMATIC STRESS DISORDER (PTSD): ICD-10-CM

## 2018-02-13 DIAGNOSIS — F33.1 MAJOR DEPRESSIVE DISORDER, RECURRENT EPISODE, MODERATE (HCC): Primary | ICD-10-CM

## 2018-02-13 PROCEDURE — 99213 OFFICE O/P EST LOW 20 MIN: CPT | Performed by: NURSE PRACTITIONER

## 2018-02-13 RX ORDER — PAROXETINE 10 MG/1
10 TABLET, FILM COATED ORAL DAILY
Qty: 30 TABLET | Refills: 1 | Status: SHIPPED | OUTPATIENT
Start: 2018-02-13 | End: 2018-03-29 | Stop reason: SDUPTHER

## 2018-02-13 NOTE — PROGRESS NOTES
"Subjective   Marilee Taylor is a 53 y.o. male who is here today for medication management follow up at the [facility], he presented to his appointment on time.      Chief Complaint:  Follow-up depression and anxiety    History of Present Illness  He went back to work at [employer] and lasted 2 days but had to stop because he was \"freaking out\".  He shares that he was taking the \"blue paxil\" but recently was given \"white paxil\" and since then he hasn't done well.  He shares that he lost it on Tuesday but was able to stay with encouragement and change of position.  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  He hasn't felt right recently.  He rates his depression 9/10 with 10 being the worse.  He feels sad, don't want to do anything, isolated and not going to [employer].   He feels paranoid when out in public.  Shares that he feels like his anxiety is ok today.  He shares that the doxepin has been helpful with his sleep, getting about 6-7 hours per night but feeling tired most of the day.  He is waking up throughout the night sweating.  He shares that his appetite is not doing well today, and having to force self to eat.  Stressors including work, life in general.  He denies any physical illness.  Denies any AV hallucinations, denies any SI/HI.       The following portions of the patient's history were reviewed and updated as appropriate: allergies, current medications, past family history, past medical history, past social history, past surgical history and problem list.    Review of Systems   Constitutional: Negative for appetite change, chills, diaphoresis, fatigue, fever and unexpected weight change.   HENT: Negative for hearing loss, sore throat, trouble swallowing and voice change.    Eyes: Negative for photophobia and visual disturbance.   Respiratory: Negative for cough, chest tightness and shortness of breath.    Cardiovascular: Negative for " "chest pain and palpitations.   Gastrointestinal: Negative for abdominal pain, constipation, nausea and vomiting.   Endocrine: Negative for cold intolerance and heat intolerance.   Genitourinary: Negative for dysuria and frequency.   Musculoskeletal: Negative for arthralgias, back pain, joint swelling and neck stiffness.   Skin: Negative for color change and wound.   Allergic/Immunologic: Negative for environmental allergies and immunocompromised state.   Neurological: Negative for dizziness, tremors, seizures, syncope, weakness, light-headedness and headaches.   Hematological: Negative for adenopathy. Does not bruise/bleed easily.       Objective   Physical Exam   Constitutional: He appears well-developed and well-nourished. No distress.   Neurological: He is alert. Coordination and gait normal.   Vitals reviewed.    Blood pressure (!) 156/104, pulse 76, height 180.3 cm (71\"), weight 88.5 kg (195 lb).    Allergies   Allergen Reactions   • Demerol Hcl [Meperidine]    • Levaquin [Levofloxacin]    • Morphine And Related    • Prozac [Fluoxetine Hcl]        Current Medications:   Current Outpatient Prescriptions   Medication Sig Dispense Refill   • albuterol (PROVENTIL HFA;VENTOLIN HFA) 108 (90 BASE) MCG/ACT inhaler Inhale 1-2 puffs. Every 4-6 hours as needed and as directed.     • atorvastatin (LIPITOR) 40 MG tablet Take 1 tablet by mouth daily. 90 tablet 0   • busPIRone (BUSPAR) 5 MG tablet Take 1 tablet by mouth 2 (Two) Times a Day. 60 tablet 1   • cholecalciferol (VITAMIN D3) 1000 units tablet Take 1,000 Units by mouth Daily.     • doxepin (SINEquan) 75 MG capsule Take 1-2 by mouth at bedtime 60 capsule 1   • fenofibrate (TRICOR) 145 MG tablet Take 1 tablet by mouth Daily. 30 tablet 5   • losartan (COZAAR) 50 MG tablet Take 1.5 tablets by mouth daily. For:  Hypertension. 45 tablet 5   • omeprazole (PriLOSEC) 20 MG capsule Take 1 capsule by mouth 2 (two) times a day. For:  Esophageal reflux. 90 capsule 1   • " PARoxetine (PAXIL) 10 MG tablet Take 1 tablet by mouth Daily. 30 tablet 1   • vitamin D (ERGOCALCIFEROL) 80737 units capsule capsule Take 1 capsule by mouth 1 (One) Time Per Week. 4 capsule 2     Current Facility-Administered Medications   Medication Dose Route Frequency Provider Last Rate Last Dose   • cyanocobalamin injection 1,000 mcg  1,000 mcg Intramuscular Q28 Days Claudette Randolph, APRN   1,000 mcg at 09/27/17 1443       Mental Status Exam:   Hygiene:   fair  Cooperation:  Cooperative  Eye Contact:  Good  Psychomotor Behavior:  Appropriate  Affect:  Blunted  Hopelessness: Denies  Speech:  Normal  Thought Process:  Goal directed and Linear  Thought Content:  Normal  Suicidal:  None  Homicidal:  None  Hallucinations:  None  Delusion:  None  Memory:  Intact  Orientation:  Person, Place, Time and Situation  Reliability:  fair  Insight:  Fair  Judgement:  Fair  Impulse Control:  Fair  Physical/Medical Issues:  No     Assessment/Plan     Major depressive disorder, recurrent episode, moderate    Generalized anxiety disorder    Post traumatic stress disorder (PTSD)    Personality disorder, unspecified    Other orders  -     busPIRone (BUSPAR) 5 MG tablet; Take 1 tablet by mouth 2 (Two) Times a Day.  -     PARoxetine (PAXIL) 10 MG tablet; Take 1 tablet by mouth Daily.        -   doxepin 75 mg take 1-2 tablets at bedtime for sleep       Discused medications options. Continue doxepin to 75 mg 1-2 tabs at bedtime for sleep and mood. Continue current medications buspar for anxiety, doxepin for sleep, and paxil for depression and anxiety. Discussed the risks, beneefits, and side effects of the medications; patient ackowledged and verbally consentedd.  Patient is aware to call the [facility] with any worsening of symptoms.  Patient is agreeable to call 911 or go to the nearest ER should he/she begin having SI/HI.  Patient is scheduled to see therapist next week (Feb 20).      Return in 6 weeks

## 2018-02-15 ENCOUNTER — TELEPHONE (OUTPATIENT)
Dept: FAMILY MEDICINE CLINIC | Facility: CLINIC | Age: 53
End: 2018-02-15

## 2018-02-20 ENCOUNTER — OFFICE VISIT (OUTPATIENT)
Dept: PSYCHIATRY | Facility: CLINIC | Age: 53
End: 2018-02-20

## 2018-02-20 DIAGNOSIS — F33.1 MAJOR DEPRESSIVE DISORDER, RECURRENT EPISODE, MODERATE (HCC): Primary | ICD-10-CM

## 2018-02-20 DIAGNOSIS — F43.10 POST TRAUMATIC STRESS DISORDER (PTSD): ICD-10-CM

## 2018-02-20 DIAGNOSIS — F41.1 GENERALIZED ANXIETY DISORDER: ICD-10-CM

## 2018-02-20 PROCEDURE — 90837 PSYTX W PT 60 MINUTES: CPT | Performed by: SOCIAL WORKER

## 2018-02-20 NOTE — PROGRESS NOTES
Date of Service: February 20, 2018  Time In: 12:35 PM  Time Out: 1:30 PM      PROGRESS NOTE  Data:  Marilee Taylor is a 53 y.o. male who met with the undersigned for a regularly scheduled individual outpatient psychotherapy session at  the [facility] for follow-up of depression, anxiety, and PTSD.  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  Patient states he made it for approximately 2 weeks the first time in 2 days the second time.  He reports he simply was not able to handle it.     HPI: Patient reports he struggle with significant symptom post manic stress disorder AND to return to work including hypervigilance, difficulty being around others, hyperarousal, increased on response, interpreting benign situations as threatening.  Patient also reports he struggles with feeling on edge, feeling overwhelmed, increased heart rate, irritability, mind goes blank, and a general sense of dread.  Patient also reports he continues to struggle with depression as evidenced by depressed mood, anhedonia, anergia, decreased libido, periods of hopelessness, and ongoing social isolation.  Patient rates current symptoms of PTSD and a 4, anxiety at a 5, and depression at a 6.  Patient reports he continues to adhere to medication regimen as prescribed.  Patient adamantly and convincingly denies suicidal ideation vehemently denies any substance use.      Clinical Maneuvering/Intervention:  Assisted patient in processing above session content; acknowledged and normalized patient’s thoughts, feelings, and concerns.  Utilized cognitive behavioral therapy to assist the patient in developing appropriate coping mechanisms decrease severity and frequency of symptoms including positive self talk, the breathing exercises, and faulty, irrational thoughts with factual counter arguments, and actively seeking enjoyable activities he can engage in a regular basis.  Also encouraged the  patient to follow through with having his testosterone levels checked with his primary care provider and following recommendations.  Provided unconditional positive regard in a safe, supportive environment.    Allowed patient to freely discuss issues without interruption or judgment. Provided safe, confidential environment to facilitate the development of positive therapeutic relationship and encourage open, honest communication. Assisted patient in identifying risk factors which would indicate the need for higher level of care including thoughts to harm self or others and/or self-harming behavior and encouraged patient to contact this office, call 911, or present to the nearest emergency room should any of these events occur. Discussed crisis intervention services and means to access.  Patient adamantly and convincingly denies current suicidal or homicidal ideation or perceptual disturbance.    Assessment    Patient appears to have experienced significantly increased symptoms while attempting to return to work.  He reports his symptoms have decreased somewhat since he is no longer expose to the work environment.  However, his symptomology continues to cause impairment important areas of functioning.  As result, he can be reasonably expected to continue to benefit treatment and likely be at increased risk for decompensation otherwise.    Diagnoses and all orders for this visit:    Major depressive disorder, recurrent episode, moderate    Generalized anxiety disorder    Post traumatic stress disorder (PTSD)               Mental Status Exam  Hygiene:  good  Dress:  casual  Attitude:  Cooperative  Motor Activity:  Appropriate  Speech:  Normal  Mood:  depressed  Affect:  calm and pleasant  Thought Processes:  Goal directed  Thought Content:  normal  Suicidal Thoughts:  denies  Homicidal Thoughts:  denies  Crisis Safety Plan: yes, to come to the emergency room.  Hallucinations:  denies    Patient's Support Network  Includes:  significant other    Progress toward goal: Not at goal    Functional Status: Moderate impairment     Prognosis: Fair with Ongoing Treatment     Plan         Patient will continue in individual outpatient psychotherapy session at the [facility] every 3-4 weeks and pharmacotherapy as scheduled with BJORN Short.  Patient will adhere to medication regimen as prescribed and report any side effects. Patient will contact this office, call 911 or present to the nearest emergency room should suicidal or homicidal ideations occur. Provide Cognitive Behavioral Therapy and Integrative Therapy to improve functioning, maintain stability, and avoid decompensation and the need for higher level of care.          Return in about 3 weeks (around 03/13/2018) for Next scheduled follow up.      This document signed by Bg Simmons LCSW, OPAL February 20, 2018 5:56 PM

## 2018-03-13 ENCOUNTER — OFFICE VISIT (OUTPATIENT)
Dept: FAMILY MEDICINE CLINIC | Facility: CLINIC | Age: 53
End: 2018-03-13

## 2018-03-13 VITALS
SYSTOLIC BLOOD PRESSURE: 150 MMHG | HEIGHT: 71 IN | WEIGHT: 190 LBS | OXYGEN SATURATION: 98 % | HEART RATE: 73 BPM | DIASTOLIC BLOOD PRESSURE: 92 MMHG | BODY MASS INDEX: 26.6 KG/M2

## 2018-03-13 DIAGNOSIS — R53.83 OTHER FATIGUE: ICD-10-CM

## 2018-03-13 DIAGNOSIS — K21.9 GASTROESOPHAGEAL REFLUX DISEASE, ESOPHAGITIS PRESENCE NOT SPECIFIED: ICD-10-CM

## 2018-03-13 DIAGNOSIS — E78.2 MIXED HYPERLIPIDEMIA: ICD-10-CM

## 2018-03-13 DIAGNOSIS — E53.8 COBALAMIN DEFICIENCY: ICD-10-CM

## 2018-03-13 DIAGNOSIS — I10 ESSENTIAL HYPERTENSION: Primary | ICD-10-CM

## 2018-03-13 DIAGNOSIS — E34.9 HYPOTESTOSTERONISM: ICD-10-CM

## 2018-03-13 DIAGNOSIS — R10.31 RIGHT LOWER QUADRANT ABDOMINAL PAIN: ICD-10-CM

## 2018-03-13 DIAGNOSIS — E55.9 VITAMIN D DEFICIENCY: ICD-10-CM

## 2018-03-13 DIAGNOSIS — E78.5 HYPERLIPIDEMIA, UNSPECIFIED HYPERLIPIDEMIA TYPE: ICD-10-CM

## 2018-03-13 DIAGNOSIS — F32.A DEPRESSION, UNSPECIFIED DEPRESSION TYPE: ICD-10-CM

## 2018-03-13 LAB
25(OH)D3 SERPL-MCNC: 8 NG/ML
ALBUMIN SERPL-MCNC: 4.4 G/DL (ref 3.5–5)
ALBUMIN/GLOB SERPL: 1.7 G/DL (ref 1.5–2.5)
ALP SERPL-CCNC: 85 U/L (ref 40–129)
ALT SERPL W P-5'-P-CCNC: 49 U/L (ref 10–44)
ANION GAP SERPL CALCULATED.3IONS-SCNC: 12.4 MMOL/L (ref 3.6–11.2)
AST SERPL-CCNC: 30 U/L (ref 10–34)
BASOPHILS # BLD AUTO: 0.04 10*3/MM3 (ref 0–0.3)
BASOPHILS NFR BLD AUTO: 0.5 % (ref 0–2)
BILIRUB SERPL-MCNC: 0.3 MG/DL (ref 0.2–1.8)
BUN BLD-MCNC: 14 MG/DL (ref 7–21)
BUN/CREAT SERPL: 17.3 (ref 7–25)
CALCIUM SPEC-SCNC: 9.3 MG/DL (ref 7.7–10)
CHLORIDE SERPL-SCNC: 104 MMOL/L (ref 99–112)
CHOLEST SERPL-MCNC: 266 MG/DL (ref 0–200)
CO2 SERPL-SCNC: 22.6 MMOL/L (ref 24.3–31.9)
CREAT BLD-MCNC: 0.81 MG/DL (ref 0.43–1.29)
DEPRECATED RDW RBC AUTO: 41.3 FL (ref 37–54)
EOSINOPHIL # BLD AUTO: 0.24 10*3/MM3 (ref 0–0.7)
EOSINOPHIL NFR BLD AUTO: 3 % (ref 0–5)
ERYTHROCYTE [DISTWIDTH] IN BLOOD BY AUTOMATED COUNT: 13.3 % (ref 11.5–14.5)
GFR SERPL CREATININE-BSD FRML MDRD: 99 ML/MIN/1.73
GLOBULIN UR ELPH-MCNC: 2.6 GM/DL
GLUCOSE BLD-MCNC: 87 MG/DL (ref 70–110)
HCT VFR BLD AUTO: 44.1 % (ref 42–52)
HDLC SERPL-MCNC: 33 MG/DL (ref 60–100)
HGB BLD-MCNC: 15.3 G/DL (ref 14–18)
IMM GRANULOCYTES # BLD: 0.05 10*3/MM3 (ref 0–0.03)
IMM GRANULOCYTES NFR BLD: 0.6 % (ref 0–0.5)
LDLC SERPL CALC-MCNC: ABNORMAL MG/DL (ref 0–100)
LDLC/HDLC SERPL: ABNORMAL {RATIO}
LYMPHOCYTES # BLD AUTO: 2.15 10*3/MM3 (ref 1–3)
LYMPHOCYTES NFR BLD AUTO: 27.3 % (ref 21–51)
MAGNESIUM SERPL-MCNC: 2.2 MG/DL (ref 1.7–2.6)
MCH RBC QN AUTO: 29.5 PG (ref 27–33)
MCHC RBC AUTO-ENTMCNC: 34.7 G/DL (ref 33–37)
MCV RBC AUTO: 85.1 FL (ref 80–94)
MONOCYTES # BLD AUTO: 0.7 10*3/MM3 (ref 0.1–0.9)
MONOCYTES NFR BLD AUTO: 8.9 % (ref 0–10)
NEUTROPHILS # BLD AUTO: 4.69 10*3/MM3 (ref 1.4–6.5)
NEUTROPHILS NFR BLD AUTO: 59.7 % (ref 30–70)
OSMOLALITY SERPL CALC.SUM OF ELEC: 277.4 MOSM/KG (ref 273–305)
PLATELET # BLD AUTO: 261 10*3/MM3 (ref 130–400)
PMV BLD AUTO: 10.1 FL (ref 6–10)
POTASSIUM BLD-SCNC: 3.4 MMOL/L (ref 3.5–5.3)
PROT SERPL-MCNC: 7 G/DL (ref 6–8)
RBC # BLD AUTO: 5.18 10*6/MM3 (ref 4.7–6.1)
SODIUM BLD-SCNC: 139 MMOL/L (ref 135–153)
TRIGL SERPL-MCNC: 1369 MG/DL (ref 0–150)
TSH SERPL DL<=0.05 MIU/L-ACNC: 1.84 MIU/ML (ref 0.55–4.78)
VIT B12 BLD-MCNC: 702 PG/ML (ref 211–911)
VLDLC SERPL-MCNC: ABNORMAL MG/DL
WBC NRBC COR # BLD: 7.87 10*3/MM3 (ref 4.5–12.5)

## 2018-03-13 PROCEDURE — 99214 OFFICE O/P EST MOD 30 MIN: CPT | Performed by: NURSE PRACTITIONER

## 2018-03-13 PROCEDURE — 82607 VITAMIN B-12: CPT | Performed by: NURSE PRACTITIONER

## 2018-03-13 PROCEDURE — 36415 COLL VENOUS BLD VENIPUNCTURE: CPT | Performed by: NURSE PRACTITIONER

## 2018-03-13 PROCEDURE — 80061 LIPID PANEL: CPT | Performed by: NURSE PRACTITIONER

## 2018-03-13 PROCEDURE — 83735 ASSAY OF MAGNESIUM: CPT | Performed by: NURSE PRACTITIONER

## 2018-03-13 PROCEDURE — 80053 COMPREHEN METABOLIC PANEL: CPT | Performed by: NURSE PRACTITIONER

## 2018-03-13 PROCEDURE — 85025 COMPLETE CBC W/AUTO DIFF WBC: CPT | Performed by: NURSE PRACTITIONER

## 2018-03-13 PROCEDURE — 84402 ASSAY OF FREE TESTOSTERONE: CPT | Performed by: NURSE PRACTITIONER

## 2018-03-13 PROCEDURE — 84403 ASSAY OF TOTAL TESTOSTERONE: CPT | Performed by: NURSE PRACTITIONER

## 2018-03-13 PROCEDURE — 84443 ASSAY THYROID STIM HORMONE: CPT | Performed by: NURSE PRACTITIONER

## 2018-03-13 PROCEDURE — 82306 VITAMIN D 25 HYDROXY: CPT | Performed by: NURSE PRACTITIONER

## 2018-03-13 RX ORDER — OMEPRAZOLE 20 MG/1
20 CAPSULE, DELAYED RELEASE ORAL DAILY
Qty: 90 CAPSULE | Refills: 1 | Status: SHIPPED | OUTPATIENT
Start: 2018-03-13 | End: 2019-10-24 | Stop reason: SDUPTHER

## 2018-03-13 RX ORDER — FENOFIBRATE 145 MG/1
145 TABLET, COATED ORAL DAILY
Qty: 90 TABLET | Refills: 1 | Status: SHIPPED | OUTPATIENT
Start: 2018-03-13 | End: 2018-03-14

## 2018-03-13 RX ORDER — ATORVASTATIN CALCIUM 40 MG/1
40 TABLET, FILM COATED ORAL DAILY
Qty: 90 TABLET | Refills: 1 | Status: SHIPPED | OUTPATIENT
Start: 2018-03-13 | End: 2018-03-14

## 2018-03-13 NOTE — PROGRESS NOTES
Subjective   Marilee Taylor is a 53 y.o. male.     Chief Complaint: Follow-up; Hypertension; and Mass (small bump on back of neck )    Hypertension   This is a chronic problem. The current episode started more than 1 year ago. The problem has been waxing and waning since onset. The problem is controlled. Associated symptoms include anxiety and malaise/fatigue. Pertinent negatives include no chest pain, headaches, palpitations or shortness of breath. There are no associated agents to hypertension. Risk factors for coronary artery disease include dyslipidemia, male gender and sedentary lifestyle. Past treatments include alpha 1 blockers, statins and fibric acid derivatives. The current treatment provides significant improvement. Compliance problems include diet and exercise.  Current antihypertension treatment includes alpha 1 blockers, statins and fibric acid derivatives.   Hyperlipidemia   This is a chronic problem. The current episode started more than 1 year ago. The problem is controlled. Factors aggravating his hyperlipidemia include fatty foods. Pertinent negatives include no chest pain or shortness of breath. Current antihyperlipidemic treatment includes fibric acid derivatives and statins. The current treatment provides significant improvement of lipids. Compliance problems include adherence to exercise and adherence to diet.  Risk factors for coronary artery disease include dyslipidemia, hypertension, male sex and a sedentary lifestyle.   Depression   Visit Type: follow-up (pt follows with psychiatry)  Patient presents with the following symptoms: decreased concentration and depressed mood.  Patient is not experiencing: palpitations and shortness of breath.  Frequency of symptoms: occasionally   Severity: moderate   Sleep quality: fair  Nighttime awakenings: occasional  Compliance with medications:  %        Heartburn   He complains of abdominal pain and heartburn. He reports no chest pain. This is a  chronic problem. The current episode started more than 1 year ago. The problem occurs occasionally. The problem has been waxing and waning. The heartburn does not wake him from sleep. The heartburn does not limit his activity. The heartburn doesn't change with position. The symptoms are aggravated by caffeine and certain foods. Risk factors include caffeine use and lack of exercise. He has tried a PPI for the symptoms. The treatment provided significant relief.   Abdominal Pain   This is a new problem. The current episode started 1 to 4 weeks ago. The onset quality is sudden. The problem occurs intermittently. The problem has been waxing and waning. The pain is located in the RLQ. The pain is moderate. The quality of the pain is aching and sharp. The abdominal pain does not radiate. Pertinent negatives include no headaches. The pain is aggravated by movement. The pain is relieved by nothing. He has tried nothing for the symptoms. His past medical history is significant for GERD.        Family History   Problem Relation Age of Onset   • Diabetes Mother    • Hypertension Mother    • Stroke Mother    • Heart disease Mother    • Rheum arthritis Father    • Heart disease Other    • Hypertension Other        Social History     Social History   • Marital status: Single     Spouse name: N/A   • Number of children: N/A   • Years of education: N/A     Occupational History   • Not on file.     Social History Main Topics   • Smoking status: Former Smoker   • Smokeless tobacco: Never Used   • Alcohol use No      Comment: stopped drinking alcohol   • Drug use: No   • Sexual activity: Defer     Other Topics Concern   • Not on file     Social History Narrative   • No narrative on file       Past Medical History:   Diagnosis Date   • Anxiety    • Depression    • Hyperlipidemia    • Hypertension    • PTSD (post-traumatic stress disorder)    • Vitamin B12 deficiency        Review of Systems   Constitutional: Positive for  "malaise/fatigue.   Respiratory: Negative for shortness of breath.    Cardiovascular: Negative for chest pain and palpitations.   Gastrointestinal: Positive for abdominal pain and heartburn.   Neurological: Negative for headaches.   Psychiatric/Behavioral: Positive for decreased concentration.       Objective   Physical Exam   Constitutional: He is oriented to person, place, and time. He appears well-developed and well-nourished.   Neck: Normal range of motion. Neck supple.   Cardiovascular: Normal rate, regular rhythm and normal heart sounds.    Pulmonary/Chest: Effort normal and breath sounds normal.   Abdominal: Soft. Bowel sounds are normal. There is no guarding.   Neurological: He is alert and oriented to person, place, and time.   Skin: Skin is warm and dry.   Psychiatric: He has a normal mood and affect. His behavior is normal. Judgment and thought content normal.   Nursing note and vitals reviewed.      Procedures    Vitals: Blood pressure 150/92, pulse 73, height 180.3 cm (71\"), weight 86.2 kg (190 lb), SpO2 98 %.    Allergies:   Allergies   Allergen Reactions   • Demerol Hcl [Meperidine]    • Levaquin [Levofloxacin]    • Morphine And Related    • Prozac [Fluoxetine Hcl]           Assessment/Plan   Marilee was seen today for follow-up, hypertension and mass.    Diagnoses and all orders for this visit:    Essential hypertension  -     CBC & Differential  -     Comprehensive Metabolic Panel  -     Magnesium  -     TSH  -     CBC Auto Differential    Hyperlipidemia, unspecified hyperlipidemia type  -     CBC & Differential  -     Comprehensive Metabolic Panel  -     Lipid Panel  -     Magnesium  -     TSH  -     CBC Auto Differential    Gastroesophageal reflux disease, esophagitis presence not specified  -     CBC & Differential  -     Comprehensive Metabolic Panel  -     Magnesium  -     TSH  -     omeprazole (priLOSEC) 20 MG capsule; Take 1 capsule by mouth Daily. For:  Esophageal reflux.  -     CBC Auto " Differential    Cobalamin deficiency    Depression, unspecified depression type  -     CBC & Differential  -     Comprehensive Metabolic Panel  -     CBC Auto Differential    Vitamin D deficiency  -     Vitamin D 25 Hydroxy    Other fatigue  -     CBC & Differential  -     Comprehensive Metabolic Panel  -     TSH  -     Vitamin B12  -     Testosterone, Free, Total  -     CBC Auto Differential    Hypotestosteronism  -     Testosterone, Free, Total    Right lower quadrant abdominal pain  -     CBC & Differential  -     CT Abdomen Pelvis Stone Protocol  -     CBC Auto Differential    Mixed hyperlipidemia  -     atorvastatin (LIPITOR) 40 MG tablet; Take 1 tablet by mouth Daily.  -     fenofibrate (TRICOR) 145 MG tablet; Take 1 tablet by mouth Daily.

## 2018-03-14 RX ORDER — ERGOCALCIFEROL 1.25 MG/1
50000 CAPSULE ORAL WEEKLY
Qty: 4 CAPSULE | Refills: 2 | Status: SHIPPED | OUTPATIENT
Start: 2018-03-14 | End: 2018-08-29 | Stop reason: SDUPTHER

## 2018-03-14 RX ORDER — POTASSIUM CHLORIDE 20 MEQ/1
40 TABLET, EXTENDED RELEASE ORAL ONCE
Qty: 2 TABLET | Refills: 0 | Status: SHIPPED | OUTPATIENT
Start: 2018-03-14 | End: 2018-03-14

## 2018-03-14 RX ORDER — ATORVASTATIN CALCIUM 20 MG/1
20 TABLET, FILM COATED ORAL DAILY
Qty: 30 TABLET | Refills: 5 | Status: SHIPPED | OUTPATIENT
Start: 2018-03-14 | End: 2019-10-29

## 2018-03-14 RX ORDER — GEMFIBROZIL 600 MG/1
600 TABLET, FILM COATED ORAL
Qty: 60 TABLET | Refills: 5 | Status: SHIPPED | OUTPATIENT
Start: 2018-03-14 | End: 2018-08-29

## 2018-03-14 NOTE — PROGRESS NOTES
Vit D is low.  He needs weekly Vit D.  I will send to pharmacy.  His triglycerides are 1,369.  I am stopping the tricor and lipitor 40mg.  Start on lipitor 20mg and lopid 600mg.  Sent to pharmacy.  Stop sweets and greatly decrease carbohydrate intake.  Recheck in 3 months.  His potassium is also low.  Will send in 2 tabs prescription.    Need to recheck potassium level in one week.

## 2018-03-15 ENCOUNTER — TELEPHONE (OUTPATIENT)
Dept: FAMILY MEDICINE CLINIC | Facility: CLINIC | Age: 53
End: 2018-03-15

## 2018-03-15 DIAGNOSIS — E87.6 HYPOKALEMIA: Primary | ICD-10-CM

## 2018-03-15 NOTE — TELEPHONE ENCOUNTER
----- Message from BJORN Vincent sent at 3/14/2018  5:45 PM EDT -----  Vit D is low.  He needs weekly Vit D.  I will send to pharmacy.  His triglycerides are 1,369.  I am stopping the tricor and lipitor 40mg.  Start on lipitor 20mg and lopid 600mg.  Sent to pharmacy.  Stop sweets and greatly decrease carbohydrate intake.  Recheck in 3 months.  His potassium is also low.  Will send in 2 tabs prescription.    Need to recheck potassium level in one week.        Spoke with patient & he verbalized undertsanding.

## 2018-03-20 ENCOUNTER — TELEPHONE (OUTPATIENT)
Dept: FAMILY MEDICINE CLINIC | Facility: CLINIC | Age: 53
End: 2018-03-20

## 2018-03-20 NOTE — TELEPHONE ENCOUNTER
Pt had testosterone free and total drawn on 3-13-18 and something went wrong in the lab and the blood could not be ran. Do you want pt to come back for this lab?

## 2018-03-22 ENCOUNTER — LAB (OUTPATIENT)
Dept: FAMILY MEDICINE CLINIC | Facility: CLINIC | Age: 53
End: 2018-03-22

## 2018-03-22 DIAGNOSIS — E87.6 HYPOKALEMIA: ICD-10-CM

## 2018-03-22 LAB — POTASSIUM BLD-SCNC: 3.5 MMOL/L (ref 3.5–5.3)

## 2018-03-22 PROCEDURE — 84132 ASSAY OF SERUM POTASSIUM: CPT | Performed by: NURSE PRACTITIONER

## 2018-03-22 PROCEDURE — 36415 COLL VENOUS BLD VENIPUNCTURE: CPT

## 2018-03-23 ENCOUNTER — TELEPHONE (OUTPATIENT)
Dept: FAMILY MEDICINE CLINIC | Facility: CLINIC | Age: 53
End: 2018-03-23

## 2018-03-23 NOTE — TELEPHONE ENCOUNTER
----- Message from BJORN Vincent sent at 3/23/2018  8:34 AM EDT -----  Potassium is normal. Please let pt know      Patient notified & verbalized understanding.

## 2018-03-29 ENCOUNTER — OFFICE VISIT (OUTPATIENT)
Dept: PSYCHIATRY | Facility: CLINIC | Age: 53
End: 2018-03-29

## 2018-03-29 VITALS
SYSTOLIC BLOOD PRESSURE: 161 MMHG | DIASTOLIC BLOOD PRESSURE: 98 MMHG | HEIGHT: 71 IN | WEIGHT: 190 LBS | HEART RATE: 89 BPM | BODY MASS INDEX: 26.6 KG/M2

## 2018-03-29 DIAGNOSIS — F41.1 GENERALIZED ANXIETY DISORDER: ICD-10-CM

## 2018-03-29 DIAGNOSIS — F43.10 POST TRAUMATIC STRESS DISORDER (PTSD): ICD-10-CM

## 2018-03-29 DIAGNOSIS — F33.1 MAJOR DEPRESSIVE DISORDER, RECURRENT EPISODE, MODERATE (HCC): Primary | ICD-10-CM

## 2018-03-29 DIAGNOSIS — F60.9 PERSONALITY DISORDER (HCC): ICD-10-CM

## 2018-03-29 PROCEDURE — 99213 OFFICE O/P EST LOW 20 MIN: CPT | Performed by: NURSE PRACTITIONER

## 2018-03-29 RX ORDER — BUSPIRONE HYDROCHLORIDE 5 MG/1
5 TABLET ORAL 2 TIMES DAILY
Qty: 60 TABLET | Refills: 1 | Status: SHIPPED | OUTPATIENT
Start: 2018-03-29 | End: 2018-05-24 | Stop reason: SDUPTHER

## 2018-03-29 RX ORDER — PAROXETINE 10 MG/1
10 TABLET, FILM COATED ORAL DAILY
Qty: 30 TABLET | Refills: 1 | Status: SHIPPED | OUTPATIENT
Start: 2018-03-29 | End: 2018-05-24 | Stop reason: SDUPTHER

## 2018-03-29 RX ORDER — DOXEPIN HYDROCHLORIDE 75 MG/1
CAPSULE ORAL
Qty: 60 CAPSULE | Refills: 1 | Status: SHIPPED | OUTPATIENT
Start: 2018-03-29 | End: 2018-05-24 | Stop reason: SDUPTHER

## 2018-03-29 NOTE — PROGRESS NOTES
"Subjective   Marilee Taylor is a 53 y.o. male who is here today for medication management follow up at the [facility], he presented to his appointment on time.      Chief Complaint:  Follow-up depression and anxiety    History of Present Illness  He shares that he is doing alright, \"better than that last.\" HE shares that the medication seems to be working well with no reported SE. He shares his appetite is well and eats 2-3 times per day but has lost 5 pounds since February. He shares he has lost this within the last two weeks. Body mass index is 26.5 kg/m². He shares he feels as though his sleep is improving with 6-8 hours of sleep per night with no NM. He rates his depression at 4/10 on scale of 1-10 with 10 being the worse. He shares he has not been able to work but has got to ride his bike and work on it and this always improves his mood. 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 He reports symptoms of lack of motivation, sad mood and high irritability when he is having symptoms. States he is now \"laughing everything off.\" Rates his anxiety at 6/10 on scale of 1-10 with 10 being the worse. He shares he felt this was the high traffic on the road on the way to his appointment. Shares he has symptoms of \"jittery, nervousness\" and increased heart rate. Shares he takes Buspar if needed and not riding his bike. He denies any new stressors. Shares he went to see his PCP due to pain in right side and he will have a CT scan scheduled soon to rule out hernia or appendix issues. He denies any SI/HI. Denies any AV hallucinations.      The following portions of the patient's history were reviewed and updated as appropriate: allergies, current medications, past family history, past medical history, past social history, past surgical history and problem list.    Review of Systems   Constitutional: Negative for appetite change, chills, diaphoresis, fatigue, fever and " "unexpected weight change.   HENT: Negative for hearing loss, sore throat, trouble swallowing and voice change.    Eyes: Negative for photophobia and visual disturbance.   Respiratory: Negative for cough, chest tightness and shortness of breath.    Cardiovascular: Negative for chest pain and palpitations.   Gastrointestinal: Negative for abdominal pain, constipation, nausea and vomiting.   Endocrine: Negative for cold intolerance and heat intolerance.   Genitourinary: Negative for dysuria and frequency.   Musculoskeletal: Negative for arthralgias, back pain, joint swelling and neck stiffness.   Skin: Negative for color change and wound.   Allergic/Immunologic: Negative for environmental allergies and immunocompromised state.   Neurological: Negative for dizziness, tremors, seizures, syncope, weakness, light-headedness and headaches.   Hematological: Negative for adenopathy. Does not bruise/bleed easily.       Objective   Physical Exam   Constitutional: He appears well-developed and well-nourished. No distress.   Neurological: He is alert. Coordination and gait normal.   Vitals reviewed.    Blood pressure 161/98, pulse 89, height 180.3 cm (71\"), weight 86.2 kg (190 lb).    Allergies   Allergen Reactions   • Demerol Hcl [Meperidine]    • Levaquin [Levofloxacin]    • Morphine And Related    • Prozac [Fluoxetine Hcl]        Current Medications:   Current Outpatient Prescriptions   Medication Sig Dispense Refill   • albuterol (PROVENTIL HFA;VENTOLIN HFA) 108 (90 BASE) MCG/ACT inhaler Inhale 1-2 puffs. Every 4-6 hours as needed and as directed.     • atorvastatin (LIPITOR) 20 MG tablet Take 1 tablet by mouth Daily. 30 tablet 5   • busPIRone (BUSPAR) 5 MG tablet Take 1 tablet by mouth 2 (Two) Times a Day. 60 tablet 1   • cholecalciferol (VITAMIN D3) 1000 units tablet Take 1,000 Units by mouth Daily.     • doxepin (SINEquan) 75 MG capsule Take 1-2 by mouth at bedtime 60 capsule 1   • gemfibrozil (LOPID) 600 MG tablet Take 1 " tablet by mouth 2 (Two) Times a Day Before Meals. 60 tablet 5   • losartan (COZAAR) 50 MG tablet Take 1.5 tablets by mouth daily. For:  Hypertension. 45 tablet 5   • omeprazole (priLOSEC) 20 MG capsule Take 1 capsule by mouth Daily. For:  Esophageal reflux. 90 capsule 1   • PARoxetine (PAXIL) 10 MG tablet Take 1 tablet by mouth Daily. 30 tablet 1   • vitamin D (ERGOCALCIFEROL) 84860 units capsule capsule Take 1 capsule by mouth 1 (One) Time Per Week. 4 capsule 2     Current Facility-Administered Medications   Medication Dose Route Frequency Provider Last Rate Last Dose   • cyanocobalamin injection 1,000 mcg  1,000 mcg Intramuscular Q28 Days BJORN Vincent   1,000 mcg at 09/27/17 1443       Mental Status Exam:   Hygiene:   fair  Cooperation:  Cooperative  Eye Contact:  Good  Psychomotor Behavior:  Appropriate  Affect:  Blunted  Hopelessness: Denies  Speech:  Normal  Thought Process:  Goal directed and Linear  Thought Content:  Normal  Suicidal:  None  Homicidal:  None  Hallucinations:  None  Delusion:  None  Memory:  Intact  Orientation:  Person, Place, Time and Situation  Reliability:  fair  Insight:  Fair  Judgement:  Fair  Impulse Control:  Fair  Physical/Medical Issues:  No     Assessment/Plan     Major depressive disorder, recurrent episode, moderate    Generalized anxiety disorder    Post traumatic stress disorder (PTSD)    Personality disorder, unspecified    Other orders  -     busPIRone (BUSPAR) 5 MG tablet; Take 1 tablet by mouth 2 (Two) Times a Day.  -     PARoxetine (PAXIL) 10 MG tablet; Take 1 tablet by mouth Daily.        -   doxepin 75 mg take 1-2 tablets at bedtime for sleep       Discused medications options. Continue doxepin to 75 mg 1-2 tabs at bedtime for sleep and mood. Continue current medications buspar for anxiety, doxepin for sleep, and paxil for depression and anxiety. Discussed the risks, beneefits, and side effects of the medications; patient ackowledged and verbally  consentedd.  Patient is aware to call the [facility] with any worsening of symptoms.  Patient is agreeable to call 911 or go to the nearest ER should he/she begin having SI/HI.  Patient is scheduled to see therapist next week (Feb 20).      Return in 8 weeks

## 2018-04-03 ENCOUNTER — OFFICE VISIT (OUTPATIENT)
Dept: PSYCHIATRY | Facility: CLINIC | Age: 53
End: 2018-04-03

## 2018-04-03 DIAGNOSIS — F43.10 POST TRAUMATIC STRESS DISORDER (PTSD): ICD-10-CM

## 2018-04-03 DIAGNOSIS — F60.9 PERSONALITY DISORDER (HCC): ICD-10-CM

## 2018-04-03 DIAGNOSIS — F41.1 GENERALIZED ANXIETY DISORDER: ICD-10-CM

## 2018-04-03 DIAGNOSIS — F33.1 MAJOR DEPRESSIVE DISORDER, RECURRENT EPISODE, MODERATE (HCC): Primary | ICD-10-CM

## 2018-04-03 PROCEDURE — 90834 PSYTX W PT 45 MINUTES: CPT | Performed by: SOCIAL WORKER

## 2018-04-03 NOTE — PROGRESS NOTES
"Date of Service: April 3, 2018  Time In: 2:45 PM  Time Out: 3:30 PM      PROGRESS NOTE  Data:  Marilee Taylor is a 53 y.o. male who met with the undersigned for a regularly scheduled individual outpatient psychotherapy session at  the [facility] for follow-up of depression, anxiety, PTSD, and personality disorder.     HPI: Patient reports things are going \"about the same\" and states he continues to ride his motorcycle as a form of therapy.  He reports he continues to struggle with PTSD which is primarily manifested when he is in social situations.  Patient reports he is relatively stable if he can avoid social situations and becoming overstimulated.  Patient also continues to struggle with depression as evidenced by depressed mood, anhedonia, irritability, difficulty concentrating, periodic feelings of hopelessness, and periods of isolation.  Patient rates symptoms of PTSD and depression at a 5 on a scale of 1-10 with 10 being most severe.  Patient also continues to struggle with anxiety including anxious mood, feeling on edge, feeling overwhelmed, increased heart rate, muscle tension, and a sense of impending doom.  Patient rates current symptoms of anxiety at a 5 on a scale of 1-10 with 10 being most severe.  Patient reports he continues to adhere to medication regimen as prescribed.  Patient adamantly and convincingly denies suicidal ideation vehemently denies any substance use.      Clinical Maneuvering/Intervention:  Assisted patient in processing above session content; acknowledged and normalized patient’s thoughts, feelings, and concerns.  Utilized motivational interviewing techniques including complex reflections to assist the patient in acknowledging positive sources of support including riding his motorcycle and helping his wife with gardening.  Utilize cognitive behavioral therapy to assist the patient in developing appropriate coping mechanisms to decrease severity and frequency of symptoms including " positive self talk, relaxation techniques, challenging thoughts becoming irrational thoughts with factual counter arguments, and strongly urged him to continue to seek enjoyable activities to engage in regular basis.  Provided unconditional positive regard in a safe, supportive environment.    Allowed patient to freely discuss issues without interruption or judgment. Provided safe, confidential environment to facilitate the development of positive therapeutic relationship and encourage open, honest communication. Assisted patient in identifying risk factors which would indicate the need for higher level of care including thoughts to harm self or others and/or self-harming behavior and encouraged patient to contact this office, call 911, or present to the nearest emergency room should any of these events occur. Discussed crisis intervention services and means to access.  Patient adamantly and convincingly denies current suicidal or homicidal ideation or perceptual disturbance.    Assessment    Patient appears to maintain relative stability as compared to his baseline.  However, he continues to struggle with significant symptoms which cause impairment important to function.  As a result, the patient immigrated expected to continue to benefit treatment and would likely be at increased risk for decompensation in her life.    Diagnoses and all orders for this visit:    Major depressive disorder, recurrent episode, moderate    Generalized anxiety disorder    Post traumatic stress disorder (PTSD)    Personality disorder               Mental Status Exam  Hygiene:  good  Dress:  casual  Attitude:  Cooperative  Motor Activity:  Appropriate  Speech:  Normal  Mood:  within normal limits  Affect:  calm and pleasant  Thought Processes:  Goal directed  Thought Content:  normal  Suicidal Thoughts:  denies  Homicidal Thoughts:  denies  Crisis Safety Plan: yes, to come to the emergency room.  Hallucinations:  denies    Patient's  Support Network Includes:  significant other and extended family    Progress toward goal: Not at goal    Functional Status: Moderate impairment     Prognosis: Fair with Ongoing Treatment     Plan         Patient will continue in individual outpatient psychotherapy sessions at the [facility] monthly and pharmacotherapy as scheduled with BJORN Short.  Patient will adhere to medication regimen as prescribed and report any side effects. Patient will contact this office, call 911 or present to the nearest emergency room should suicidal or homicidal ideations occur. Provide Cognitive Behavioral Therapy and Integrative Therapy to improve functioning, maintain stability, and avoid decompensation and the need for higher level of care.          Return in about 4 weeks (around 5/1/2018) for Next scheduled follow up.      This document signed by Bg Simmons LCSW, OPAL April 3, 2018 5:47 PM

## 2018-04-25 ENCOUNTER — HOSPITAL ENCOUNTER (OUTPATIENT)
Dept: CT IMAGING | Facility: HOSPITAL | Age: 53
End: 2018-04-25

## 2018-04-25 ENCOUNTER — HOSPITAL ENCOUNTER (OUTPATIENT)
Dept: CT IMAGING | Facility: HOSPITAL | Age: 53
Discharge: HOME | End: 2018-04-25
Admitting: NURSE PRACTITIONER

## 2018-04-25 ENCOUNTER — HOSPITAL ENCOUNTER (OUTPATIENT)
Dept: CT IMAGING | Facility: HOSPITAL | Age: 53
Discharge: HOME | End: 2018-04-25

## 2018-04-25 DIAGNOSIS — R10.31 RIGHT LOWER QUADRANT ABDOMINAL PAIN: Primary | ICD-10-CM

## 2018-04-25 DIAGNOSIS — R41.3 MEMORY LOSS: ICD-10-CM

## 2018-04-25 DIAGNOSIS — R10.31 ABDOMINAL PAIN, RIGHT LOWER QUADRANT: ICD-10-CM

## 2018-04-25 DIAGNOSIS — R10.31 RIGHT LOWER QUADRANT ABDOMINAL PAIN: ICD-10-CM

## 2018-04-25 PROCEDURE — 74176 CT ABD & PELVIS W/O CONTRAST: CPT | Performed by: RADIOLOGY

## 2018-04-25 PROCEDURE — 74176 CT ABD & PELVIS W/O CONTRAST: CPT

## 2018-04-26 ENCOUNTER — TRANSCRIBE ORDERS (OUTPATIENT)
Dept: FAMILY MEDICINE CLINIC | Facility: CLINIC | Age: 53
End: 2018-04-26

## 2018-04-26 DIAGNOSIS — R10.31 ABDOMINAL PAIN, RIGHT LOWER QUADRANT: Primary | ICD-10-CM

## 2018-04-26 DIAGNOSIS — R41.3 MEMORY LOSS: ICD-10-CM

## 2018-04-27 ENCOUNTER — TELEPHONE (OUTPATIENT)
Dept: FAMILY MEDICINE CLINIC | Facility: CLINIC | Age: 53
End: 2018-04-27

## 2018-04-27 RX ORDER — POLYETHYLENE GLYCOL 3350 17 G/17G
17 POWDER, FOR SOLUTION ORAL 2 TIMES DAILY PRN
Qty: 100 EACH | Refills: 2 | Status: SHIPPED | OUTPATIENT
Start: 2018-04-27 | End: 2019-11-21

## 2018-04-27 NOTE — PROGRESS NOTES
CT abdomen does show that he has a large amount of stool in the colon.  He would probably benefit from Miralax.  Is he agreeable?

## 2018-04-27 NOTE — TELEPHONE ENCOUNTER
----- Message from BJORN Vincent sent at 4/27/2018 11:34 AM EDT -----  CT abdomen does show that he has a large amount of stool in the colon.  He would probably benefit from Miralax.  Is he agreeable?      Attempted to contact patient,not available,will attempt later.    Zoie called & was given the results per his HIPPA form,she said to send in to pharmacy & she will pick it up for him.

## 2018-05-01 ENCOUNTER — OFFICE VISIT (OUTPATIENT)
Dept: PSYCHIATRY | Facility: CLINIC | Age: 53
End: 2018-05-01

## 2018-05-01 DIAGNOSIS — F33.1 MAJOR DEPRESSIVE DISORDER, RECURRENT EPISODE, MODERATE (HCC): Primary | ICD-10-CM

## 2018-05-01 DIAGNOSIS — F41.1 GENERALIZED ANXIETY DISORDER: ICD-10-CM

## 2018-05-01 DIAGNOSIS — F43.10 POST TRAUMATIC STRESS DISORDER (PTSD): ICD-10-CM

## 2018-05-01 DIAGNOSIS — F60.9 PERSONALITY DISORDER (HCC): ICD-10-CM

## 2018-05-01 PROCEDURE — 90837 PSYTX W PT 60 MINUTES: CPT | Performed by: SOCIAL WORKER

## 2018-05-01 NOTE — PROGRESS NOTES
"Date of Service: May 1, 2018  Time In: 2:50 PM  Time Out: 3:50 PM      PROGRESS NOTE  Data:  Marilee Taylor is a 53 y.o. male who met with the undersigned for a regularly scheduled individual outpatient therapy session at the [facility] for follow-up of depression, PTSD, and anxiety.     HPI: Patient reports he feels he is doing \"about the same\".  He reports he continues to avoid social situations with large crowds of people and remarks he has attempted to work 3 different jobs with the longest lasting 2 days.  Patient does report he is considering attempting to start his own [employer] business.  The patient reports he continues to struggle with depression including depressed mood, anhedonia, anergia, periods of hopelessness, periodic insomnia, and periods of social isolation.  The patient rates current symptoms of depression at 5 on a scale of 1-10 with 10 being most severe.  Patient does report his symptoms are much improved on medication.  Patient also continues to struggle with anxiety including anxious mood, feeling on edge, feeling overwhelmed, increased heart rate, shortness of breath, mind goes blank, and a sense of impending doom.  Patient rates current symptoms of anxiety at a 5 on a scale 1-10 with 10 being most severe.  Patient also reports he continues to struggle with PTSD including unwanted thoughts, flashbacks, hypervigilance, increased startle response, and periodic traumatic dreams.  Patient rates current symptoms of PTSD at a 5 on a scale 1-10 with 10 being most severe.  Patient reports he continues to adhere to medication regimen as prescribed.  Patient adamantly and convincingly denies suicidal ideation and vehemently denies any substance use.      Clinical Maneuvering/Intervention:  Assisted patient in processing above session content; acknowledged and normalized patient’s thoughts, feelings, and concerns.  Utilized motivational interviewing techniques including complex reflections to " assist the patient in identifying and acknowledging positive aspects of his life.  Also utilize solution focused therapy to assist the patient in identifying coping mechanisms he has found to be effective in reducing frequency and severity of symptoms including riding his motorcycle and doing chores around the home.  Also utilized cognitive behavioral therapy to assist the patient in developing specific, mechanism to decrease severity and frequency of symptoms including positive self talk, relaxation techniques, guided imagery, and challenging faulty, irrational thoughts factual counter arguments.  Provided unconditional positive regard in a safe, supportive environment.    Allowed patient to freely discuss issues without interruption or judgment. Provided safe, confidential environment to facilitate the development of positive therapeutic relationship and encourage open, honest communication. Assisted patient in identifying risk factors which would indicate the need for higher level of care including thoughts to harm self or others and/or self-harming behavior and encouraged patient to contact this office, call 911, or present to the nearest emergency room should any of these events occur. Discussed crisis intervention services and means to access.  Patient adamantly and convincingly denies current suicidal or homicidal ideation or perceptual disturbance.    Assessment    Patient continues to struggle with posttraumatic stress disorder, anxiety, and depression which continue to cause impairment in important areas of functioning including social, interpersonal, and vocational domains.  Patient has a history of suicidal ideation with at least 1 suicide attempt and would likely be at increased risk for decompensation without ongoing treatment.    Diagnoses and all orders for this visit:    Major depressive disorder, recurrent episode, moderate    Post traumatic stress disorder (PTSD)    Generalized anxiety  disorder    Personality disorder               Mental Status Exam  Hygiene:  good  Dress:  casual  Attitude:  Cooperative  Motor Activity:  Appropriate  Speech:  Normal  Mood:  depressed  Affect:  calm and pleasant  Thought Processes:  Goal directed  Thought Content:  normal  Suicidal Thoughts:  denies  Homicidal Thoughts:  denies  Crisis Safety Plan: yes, to come to the emergency room.  Hallucinations:  denies    Patient's Support Network Includes:  significant other    Progress toward goal: Not at goal    Functional Status: Moderate impairment     Prognosis: Fair with Ongoing Treatment     Plan         Patient will continue in individual outpatient therapy sessions every 4 weeks at the [facility] and pharmacotherapy as scheduled with BJORN Short.  Patient will adhere to medication regimen as prescribed and report any side effects. Patient will contact this office, call 911 or present to the nearest emergency room should suicidal or homicidal ideations occur. Provide Cognitive Behavioral Therapy and Integrative Therapy to improve functioning, maintain stability, and avoid decompensation and the need for higher level of care.          Return in about 4 weeks (around 5/29/2018) for Next scheduled follow up.      This document signed by Bg Simmons LCSW, [facility] May 1, 2018 4:15 PM

## 2018-05-02 ENCOUNTER — APPOINTMENT (OUTPATIENT)
Dept: CT IMAGING | Facility: HOSPITAL | Age: 53
End: 2018-05-02

## 2018-05-24 ENCOUNTER — OFFICE VISIT (OUTPATIENT)
Dept: PSYCHIATRY | Facility: CLINIC | Age: 53
End: 2018-05-24

## 2018-05-24 VITALS
SYSTOLIC BLOOD PRESSURE: 166 MMHG | WEIGHT: 189 LBS | BODY MASS INDEX: 26.46 KG/M2 | DIASTOLIC BLOOD PRESSURE: 112 MMHG | HEART RATE: 99 BPM | HEIGHT: 71 IN

## 2018-05-24 DIAGNOSIS — F41.1 GENERALIZED ANXIETY DISORDER: ICD-10-CM

## 2018-05-24 DIAGNOSIS — F43.10 POST TRAUMATIC STRESS DISORDER (PTSD): ICD-10-CM

## 2018-05-24 DIAGNOSIS — F33.1 MAJOR DEPRESSIVE DISORDER, RECURRENT EPISODE, MODERATE (HCC): Primary | ICD-10-CM

## 2018-05-24 DIAGNOSIS — F60.9 PERSONALITY DISORDER (HCC): ICD-10-CM

## 2018-05-24 PROCEDURE — 99213 OFFICE O/P EST LOW 20 MIN: CPT | Performed by: NURSE PRACTITIONER

## 2018-05-24 RX ORDER — DOXEPIN HYDROCHLORIDE 75 MG/1
CAPSULE ORAL
Qty: 60 CAPSULE | Refills: 1 | Status: SHIPPED | OUTPATIENT
Start: 2018-05-24 | End: 2018-07-19 | Stop reason: SDUPTHER

## 2018-05-24 RX ORDER — PAROXETINE 10 MG/1
10 TABLET, FILM COATED ORAL DAILY
Qty: 30 TABLET | Refills: 1 | Status: SHIPPED | OUTPATIENT
Start: 2018-05-24 | End: 2018-07-19 | Stop reason: SDUPTHER

## 2018-05-24 RX ORDER — BUSPIRONE HYDROCHLORIDE 5 MG/1
5 TABLET ORAL 2 TIMES DAILY
Qty: 60 TABLET | Refills: 1 | Status: SHIPPED | OUTPATIENT
Start: 2018-05-24 | End: 2018-07-19 | Stop reason: SDUPTHER

## 2018-05-24 NOTE — PROGRESS NOTES
"  Subjective   Marilee Taylor is a 53 y.o. male is here today for medication management follow-up at [facility], he presents to his appointment on time.    Chief Complaint:  Depression, anxiety     History of Present Illness  He states that he is doing well with his current medications, he shares that for the last 2 weeks he has had anxiety and panic attacks for no reason.  He will get nervous, shakes, and gasps for air for about 20 minutes.  He states that he is taking his medications as prescribed, denies any SE or problems.  He feels like it may have to do with his body getting adjusted to be out and active more.  He shares that he is having mild depression where he will not want to get up and do things.  He doesn't want to be bothered and left alone.  He rates his anxiety 3/10 with 10 being the worse.  He states that his sleep has been \"pretty good\", however he is having \"cold sweats\" and states that last night he got about 3 1/2 hours; he is averaging about 8 hours per night with no NM.  Appetite is good with no significant weight changes.  Body mass index is 26.36 kg/m².He has been monitoring his intake and been out more.  He states that he had a colon blockage that caused him a lot of pain, started medication that helped to loosen it up.  He has noted to have elevated BP, shares that he doesn't believes that his medication has kicked in yet.  He denies any new stressors, \"I am very happy today, got some money\". He has paid his child support in full after 29 years. He denies any AV hallucinations, denies any SI/IH.     The following portions of the patient's history were reviewed and updated as appropriate: allergies, current medications, past family history, past medical history, past social history, past surgical history and problem list.    Review of Systems   Constitutional: Negative for appetite change, chills, diaphoresis, fatigue, fever and unexpected weight change.   HENT: Negative for " "hearing loss, sore throat, trouble swallowing and voice change.    Eyes: Negative for photophobia and visual disturbance.   Respiratory: Negative for cough, chest tightness and shortness of breath.    Cardiovascular: Negative for chest pain and palpitations.   Gastrointestinal: Negative for abdominal pain, constipation, nausea and vomiting.   Endocrine: Negative for cold intolerance and heat intolerance.   Genitourinary: Negative for dysuria and frequency.   Musculoskeletal: Negative for arthralgias, back pain, joint swelling and neck stiffness.   Skin: Negative for color change and wound.   Allergic/Immunologic: Negative for environmental allergies and immunocompromised state.   Neurological: Negative for dizziness, tremors, seizures, syncope, weakness, light-headedness and headaches.   Hematological: Negative for adenopathy. Does not bruise/bleed easily.       Objective   Physical Exam   Constitutional: He appears well-developed and well-nourished. No distress.   Neurological: He is alert. Coordination and gait normal.   Vitals reviewed.    Blood pressure (!) 166/112, pulse 99, height 180.3 cm (71\"), weight 85.7 kg (189 lb).    Medication List:   Current Outpatient Prescriptions   Medication Sig Dispense Refill   • albuterol (PROVENTIL HFA;VENTOLIN HFA) 108 (90 BASE) MCG/ACT inhaler Inhale 1-2 puffs. Every 4-6 hours as needed and as directed.     • atorvastatin (LIPITOR) 20 MG tablet Take 1 tablet by mouth Daily. 30 tablet 5   • busPIRone (BUSPAR) 5 MG tablet Take 1 tablet by mouth 2 (Two) Times a Day. 60 tablet 1   • cholecalciferol (VITAMIN D3) 1000 units tablet Take 1,000 Units by mouth Daily.     • doxepin (SINEquan) 75 MG capsule Take 1-2 by mouth at bedtime 60 capsule 1   • gemfibrozil (LOPID) 600 MG tablet Take 1 tablet by mouth 2 (Two) Times a Day Before Meals. 60 tablet 5   • losartan (COZAAR) 50 MG tablet Take 1.5 tablets by mouth daily. For:  Hypertension. 45 tablet 5   • omeprazole (priLOSEC) 20 MG " capsule Take 1 capsule by mouth Daily. For:  Esophageal reflux. 90 capsule 1   • PARoxetine (PAXIL) 10 MG tablet Take 1 tablet by mouth Daily. 30 tablet 1   • polyethylene glycol (MIRALAX) packet Take 17 g by mouth 2 (Two) Times a Day As Needed (constipation). 100 each 2   • vitamin D (ERGOCALCIFEROL) 23310 units capsule capsule Take 1 capsule by mouth 1 (One) Time Per Week. 4 capsule 2     Current Facility-Administered Medications   Medication Dose Route Frequency Provider Last Rate Last Dose   • cyanocobalamin injection 1,000 mcg  1,000 mcg Intramuscular Q28 Days Claudette Ospina Agustín, BJORN   1,000 mcg at 09/27/17 1443       Mental Status Exam:   Hygiene:   good  Cooperation:  Cooperative  Eye Contact:  Fair  Psychomotor Behavior:  Appropriate  Affect:  Appropriate  Hopelessness: Denies  Speech:  Normal  Thought Process:  Goal directed and Linear  Thought Content:  Mood congurent  Suicidal:  None  Homicidal:  None  Hallucinations:  None  Delusion:  None  Memory:  Intact  Orientation:  Person, Place, Time and Situation  Reliability:  fair  Insight:  Fair  Judgement:  Fair  Impulse Control:  Fair  Physical/Medical Issues:  No     Assessment/Plan   Problems Addressed this Visit     None      Visit Diagnoses     Major depressive disorder, recurrent episode, moderate    -  Primary    Relevant Medications    busPIRone (BUSPAR) 5 MG tablet    PARoxetine (PAXIL) 10 MG tablet    doxepin (SINEquan) 75 MG capsule    Post traumatic stress disorder (PTSD)        Relevant Medications    busPIRone (BUSPAR) 5 MG tablet    PARoxetine (PAXIL) 10 MG tablet    doxepin (SINEquan) 75 MG capsule    Generalized anxiety disorder        Relevant Medications    busPIRone (BUSPAR) 5 MG tablet    PARoxetine (PAXIL) 10 MG tablet    doxepin (SINEquan) 75 MG capsule    Personality disorder        Relevant Medications    busPIRone (BUSPAR) 5 MG tablet    PARoxetine (PAXIL) 10 MG tablet    doxepin (SINEquan) 75 MG capsule        Discussed  medication options.  Reviewed the risks, benefits, and side effects of the medications; patient acknowledged and verbally consented.  Patient is agreeable to call the [facility].  Patient is aware to call 911 or go to the nearest ER should begin having SI/HI.     Prognosis: Guarded dependent on medication, follow up appointment and treatment plan compliance     Functionality: Fair: Symptoms mostly under control, he is able to interact with others with extreme anxiety in public.     Return in 8 weeks

## 2018-05-29 ENCOUNTER — OFFICE VISIT (OUTPATIENT)
Dept: PSYCHIATRY | Facility: CLINIC | Age: 53
End: 2018-05-29

## 2018-05-29 DIAGNOSIS — F43.10 POST TRAUMATIC STRESS DISORDER (PTSD): ICD-10-CM

## 2018-05-29 DIAGNOSIS — F60.9 PERSONALITY DISORDER (HCC): ICD-10-CM

## 2018-05-29 DIAGNOSIS — F41.1 GENERALIZED ANXIETY DISORDER: ICD-10-CM

## 2018-05-29 DIAGNOSIS — F33.1 MAJOR DEPRESSIVE DISORDER, RECURRENT EPISODE, MODERATE (HCC): Primary | ICD-10-CM

## 2018-05-29 PROCEDURE — 90837 PSYTX W PT 60 MINUTES: CPT | Performed by: SOCIAL WORKER

## 2018-05-29 NOTE — PROGRESS NOTES
"Date of Service: May 29, 2018  Time In: 3:10 PM  Time Out: 4:07 PM      PROGRESS NOTE  Data:  Marilee Taylor is a 53 y.o. male who met with the undersigned for a regularly scheduled individual outpatient therapy session at the [facility] for follow-up of depression, PTSD, and anxiety.     HPI: Patient reports he feels he is doing \"pretty good\".  The patient reports he he feels his depression is improved but states he continues to struggle with periods of depressed mood, anhedonia, anergia, periods of hopelessness, periodic insomnia, and periods of social isolation.  The patient rates current symptoms of depression at 3 on a scale of 1-10 with 10 being most severe.  Patient does report his symptoms are much improved on medication.  Patient also continues to struggle with anxiety including anxious mood, feeling on edge, feeling overwhelmed, increased heart rate, shortness of breath, mind goes blank, and a sense of impending doom.  Patient rates current symptoms of anxiety at a 5 on a scale 1-10 with 10 being most severe.  Patient continues to exhibit avoidance behavior and states he has significant difficulty in social situations.  Patient also reports he continues to struggle with PTSD including unwanted thoughts, flashbacks, hypervigilance, increased startle response, and periodic traumatic dreams.  Patient rates current symptoms of PTSD at a 5 on a scale 1-10 with 10 being most severe.  Patient reports he continues to adhere to medication regimen as prescribed.  Patient adamantly and convincingly denies suicidal ideation and vehemently denies any substance use.      Clinical Maneuvering/Intervention:  Assisted patient in processing above session content; acknowledged and normalized patient’s thoughts, feelings, and concerns.  Utilized cognitive behavioral therapy to assist the patient in developing utilizing appropriate coping mechanisms to decrease severity and frequency of symptoms including positive self " talk, relaxation techniques, challenging faulty, irrational thoughts with factual counter arguments, and discussed and demonstrated guided imagery and encouraged patient to continue to practice and utilize as necessary.  Provided unconditional positive regard in a safe, supportive environment.    Allowed patient to freely discuss issues without interruption or judgment. Provided safe, confidential environment to facilitate the development of positive therapeutic relationship and encourage open, honest communication. Assisted patient in identifying risk factors which would indicate the need for higher level of care including thoughts to harm self or others and/or self-harming behavior and encouraged patient to contact this office, call 911, or present to the nearest emergency room should any of these events occur. Discussed crisis intervention services and means to access.  Patient adamantly and convincingly denies current suicidal or homicidal ideation or perceptual disturbance.    Assessment    Patient appears to be doing somewhat better at this time and continues to maintain relative stability as compared to his baseline.  However, he has a history of being susceptible to an external locus of control and an exacerbation of symptoms.  Patient has a history of suicidal ideation with at least 1 suicide attempt and would likely be at increased risk for decompensation without ongoing treatment.    Diagnoses and all orders for this visit:    Major depressive disorder, recurrent episode, moderate    Post traumatic stress disorder (PTSD)    Generalized anxiety disorder    Personality disorder               Mental Status Exam  Hygiene:  good  Dress:  casual  Attitude:  Cooperative  Motor Activity:  Appropriate  Speech:  Normal  Mood:  depressed  Affect:  calm and pleasant  Thought Processes:  Goal directed  Thought Content:  normal  Suicidal Thoughts:  denies  Homicidal Thoughts:  denies  Crisis Safety Plan: yes, to come  to the emergency room.  Hallucinations:  denies    Patient's Support Network Includes:  significant other    Progress toward goal: Not at goal    Functional Status: Moderate impairment     Prognosis: Fair with Ongoing Treatment     Plan         Patient will continue in individual outpatient therapy sessions every 4 weeks at the [facility] and pharmacotherapy as scheduled with BJORN Short.  Patient will adhere to medication regimen as prescribed and report any side effects. Patient will contact this office, call 911 or present to the nearest emergency room should suicidal or homicidal ideations occur. Provide Cognitive Behavioral Therapy and Integrative Therapy to improve functioning, maintain stability, and avoid decompensation and the need for higher level of care.          Return in about 2 months (around 7/29/2018) for Next scheduled follow up.      This document signed by Bg Simmons LCSW, [facility] May 29, 2018 6:46 PM

## 2018-07-19 ENCOUNTER — OFFICE VISIT (OUTPATIENT)
Dept: PSYCHIATRY | Facility: CLINIC | Age: 53
End: 2018-07-19

## 2018-07-19 VITALS
SYSTOLIC BLOOD PRESSURE: 155 MMHG | DIASTOLIC BLOOD PRESSURE: 100 MMHG | HEART RATE: 97 BPM | BODY MASS INDEX: 27.16 KG/M2 | WEIGHT: 194 LBS | HEIGHT: 71 IN

## 2018-07-19 DIAGNOSIS — F43.10 POST TRAUMATIC STRESS DISORDER (PTSD): ICD-10-CM

## 2018-07-19 DIAGNOSIS — F33.1 MAJOR DEPRESSIVE DISORDER, RECURRENT EPISODE, MODERATE (HCC): Primary | ICD-10-CM

## 2018-07-19 DIAGNOSIS — F60.9 PERSONALITY DISORDER (HCC): ICD-10-CM

## 2018-07-19 DIAGNOSIS — F41.1 GENERALIZED ANXIETY DISORDER: ICD-10-CM

## 2018-07-19 PROCEDURE — 99214 OFFICE O/P EST MOD 30 MIN: CPT | Performed by: NURSE PRACTITIONER

## 2018-07-19 RX ORDER — PAROXETINE HYDROCHLORIDE 20 MG/1
20 TABLET, FILM COATED ORAL DAILY
Qty: 30 TABLET | Refills: 0 | Status: SHIPPED | OUTPATIENT
Start: 2018-07-19 | End: 2018-08-16 | Stop reason: SDUPTHER

## 2018-07-19 RX ORDER — DOXEPIN HYDROCHLORIDE 75 MG/1
CAPSULE ORAL
Qty: 60 CAPSULE | Refills: 0 | Status: SHIPPED | OUTPATIENT
Start: 2018-07-19 | End: 2018-08-16 | Stop reason: SDUPTHER

## 2018-07-19 RX ORDER — BUSPIRONE HYDROCHLORIDE 10 MG/1
10 TABLET ORAL 2 TIMES DAILY
Qty: 60 TABLET | Refills: 0 | Status: SHIPPED | OUTPATIENT
Start: 2018-07-19 | End: 2018-08-16 | Stop reason: SDUPTHER

## 2018-07-19 NOTE — PROGRESS NOTES
"  Subjective   Marilee Taylor is a 53 y.o. male is here today for medication management follow-up at [facility], he presents to his appointment on time.    Chief Complaint:  Depression, anxiety     History of Present Illness  He states that he has done better, but he continues to have depression.  He states that the last 2 weeks have been bad and this is first time that he has brought his bike out.  He doesn't have any particular reason to be agitated but states that he just feels tired, he doesn't want to be bothered.  He states that he takes his medications as prescribed, denies any SE or problems.  He rates his depression 9/10 with 10 being the worse, he describes he doesn't want to do anything, lack of motivation and low energy, he describes feelings of being worthless.  He states that his anxiety has been \"elijah high\" for over 3 weeks for no particular reason with feelings that he is having panic attacks, short tempered, and nervousness. He states that he has problems with falling asleep, he is falling asleep around 3 am, he is getting between 6 hours per night with no NM but weird dreams of when he was a kid.  He shares that he has to force self to eat, weight has been stable (noted to have heavy riding boots on).  Body mass index is 27.06 kg/m².  Recommended that he eat healthy and try to exercise.  He shares that he has had an upper respiratory infection and has been on antibiotics.  Denies any new stressors.  Denies any current AV hallucinations, but shares that he occasionally hears voices.  Denies any SI/HI, he shares that he has had thoughts of hurting self but more fleeting.  He has been able to distract self easy at this time.  However, reviewed safety plan and he is aware to call the crisis line should he need to or call 911/go to nearest ER should he begin feeling SI.        The following portions of the patient's history were reviewed and updated as appropriate: allergies, current " "medications, past family history, past medical history, past social history, past surgical history and problem list.    Review of Systems   Constitutional: Negative for appetite change, chills, diaphoresis, fatigue, fever and unexpected weight change.   HENT: Negative for hearing loss, sore throat, trouble swallowing and voice change.    Eyes: Negative for photophobia and visual disturbance.   Respiratory: Negative for cough, chest tightness and shortness of breath.    Cardiovascular: Negative for chest pain and palpitations.   Gastrointestinal: Negative for abdominal pain, constipation, nausea and vomiting.   Endocrine: Negative for cold intolerance and heat intolerance.   Genitourinary: Negative for dysuria and frequency.   Musculoskeletal: Negative for arthralgias, back pain, joint swelling and neck stiffness.   Skin: Negative for color change and wound.   Allergic/Immunologic: Negative for environmental allergies and immunocompromised state.   Neurological: Negative for dizziness, tremors, seizures, syncope, weakness, light-headedness and headaches.   Hematological: Negative for adenopathy. Does not bruise/bleed easily.       Objective   Physical Exam   Constitutional: He appears well-developed and well-nourished. No distress.   Neurological: He is alert. Coordination and gait normal.   Vitals reviewed.    Blood pressure 155/100, pulse 97, height 180.3 cm (71\"), weight 88 kg (194 lb).    Medication List:   Current Outpatient Prescriptions   Medication Sig Dispense Refill   • albuterol (PROVENTIL HFA;VENTOLIN HFA) 108 (90 BASE) MCG/ACT inhaler Inhale 1-2 puffs. Every 4-6 hours as needed and as directed.     • atorvastatin (LIPITOR) 20 MG tablet Take 1 tablet by mouth Daily. 30 tablet 5   • busPIRone (BUSPAR) 10 MG tablet Take 1 tablet by mouth 2 (Two) Times a Day. 60 tablet 0   • cholecalciferol (VITAMIN D3) 1000 units tablet Take 1,000 Units by mouth Daily.     • doxepin (SINEquan) 75 MG capsule Take 1-2 by " mouth at bedtime 60 capsule 0   • gemfibrozil (LOPID) 600 MG tablet Take 1 tablet by mouth 2 (Two) Times a Day Before Meals. 60 tablet 5   • losartan (COZAAR) 50 MG tablet Take 1.5 tablets by mouth daily. For:  Hypertension. 45 tablet 5   • omeprazole (priLOSEC) 20 MG capsule Take 1 capsule by mouth Daily. For:  Esophageal reflux. 90 capsule 1   • PARoxetine (PAXIL) 20 MG tablet Take 1 tablet by mouth Daily. 30 tablet 0   • polyethylene glycol (MIRALAX) packet Take 17 g by mouth 2 (Two) Times a Day As Needed (constipation). 100 each 2   • vitamin D (ERGOCALCIFEROL) 09728 units capsule capsule Take 1 capsule by mouth 1 (One) Time Per Week. 4 capsule 2     Current Facility-Administered Medications   Medication Dose Route Frequency Provider Last Rate Last Dose   • cyanocobalamin injection 1,000 mcg  1,000 mcg Intramuscular Q28 Days BJORN Vincent   1,000 mcg at 09/27/17 1443       Mental Status Exam:   Hygiene:   good  Cooperation:  Cooperative  Eye Contact:  Fair  Psychomotor Behavior:  Appropriate  Affect:  Appropriate  Hopelessness: Denies  Speech:  Normal  Thought Process:  Goal directed and Linear  Thought Content:  Mood congurent  Suicidal:  None  Homicidal:  None  Hallucinations:  None  Delusion:  None  Memory:  Intact  Orientation:  Person, Place, Time and Situation  Reliability:  fair  Insight:  Fair  Judgement:  Fair  Impulse Control:  Fair  Physical/Medical Issues:  No     Assessment/Plan   Problems Addressed this Visit     None      Visit Diagnoses     Major depressive disorder, recurrent episode, moderate (CMS/HCC)    -  Primary    Relevant Medications    busPIRone (BUSPAR) 10 MG tablet    doxepin (SINEquan) 75 MG capsule    PARoxetine (PAXIL) 20 MG tablet    Post traumatic stress disorder (PTSD)        Relevant Medications    busPIRone (BUSPAR) 10 MG tablet    doxepin (SINEquan) 75 MG capsule    PARoxetine (PAXIL) 20 MG tablet    Generalized anxiety disorder        Relevant Medications     busPIRone (BUSPAR) 10 MG tablet    doxepin (SINEquan) 75 MG capsule    PARoxetine (PAXIL) 20 MG tablet    Personality disorder        Relevant Medications    busPIRone (BUSPAR) 10 MG tablet    doxepin (SINEquan) 75 MG capsule    PARoxetine (PAXIL) 20 MG tablet        Discussed medication options.  Increase buspar for anxiety, paxil for depression and anxiety, and doxepin for sleep. Reviewed the risks, benefits, and side effects of the medications; patient acknowledged and verbally consented.  Patient is agreeable to call the [facility].  Patient is aware to call 911 or go to the nearest ER should begin having SI/HI.     Prognosis: Guarded dependent on medication, follow up appointment and treatment plan compliance     Functionality: Fair: Symptoms mostly under control, he is able to interact with others with extreme anxiety in public.     Return in 4 weeks

## 2018-07-25 ENCOUNTER — OFFICE VISIT (OUTPATIENT)
Dept: PSYCHIATRY | Facility: CLINIC | Age: 53
End: 2018-07-25

## 2018-07-25 DIAGNOSIS — F33.1 MAJOR DEPRESSIVE DISORDER, RECURRENT EPISODE, MODERATE (HCC): Primary | ICD-10-CM

## 2018-07-25 DIAGNOSIS — F41.1 GENERALIZED ANXIETY DISORDER: ICD-10-CM

## 2018-07-25 DIAGNOSIS — F43.10 POST TRAUMATIC STRESS DISORDER (PTSD): ICD-10-CM

## 2018-07-25 DIAGNOSIS — F60.9 PERSONALITY DISORDER (HCC): ICD-10-CM

## 2018-07-25 PROCEDURE — 90832 PSYTX W PT 30 MINUTES: CPT | Performed by: SOCIAL WORKER

## 2018-07-25 NOTE — PROGRESS NOTES
Date of Service: July 25, 2018  Time In: 1:45 PM  Time Out:       P to 15 p.mYUEMADELEINE NOTE  Data:  Marilee Taylor is a 53 y.o. male who met with the undersigned for a regularly scheduled individual outpatient therapy session at the [facility] for follow-up of depression, PTSD, and anxiety.     HPI:Patient reports he has been struggling with increased symptoms of depression as of late and states he has struggled with transient suicidal ideation.  Patient reports he just feels as though he is been overwhelmed with stress but is unable to identify specific trigger.  The patient reports he continues to struggle withdepressed mood, anhedonia, anergia, periods of hopelessness, periodic insomnia, and periods of social isolation.  The patient rates current symptoms of depression at  7n a scale of 1-10 with 10 being most severe.  Patient also continues to struggle with anxiety including anxious mood, feeling on edge, feeling overwhelmed, increased heart rate, shortness of breath, mind goes blank, and a sense of impending doom.  Patient rates current symptoms of anxiety at a 5 on a scale 1-10 with 10 being most severe.  Patient continues to exhibit avoidance behavior and states he has significant difficulty in social situations.  Patient also repoongoing symptoms of PTSD  including unwanted thoughts, flashbacks, hypervigilance, increased startle response, and periodic traumatic dreams.  Patient rates current symptoms of PTSD at a 5 on a scale 1-10 with 10 being most severe.  Patient reports he continues to adhere to medication regimen as prescribed.  Patient adamantly and convincingly denies suicidal ideation and vehemently denies any substance use.  The patient is able to identify protective factors.        Clinical Maneuvering/Intervention:  Assisted patient in processing above session content; acknowledged and normalized patient’s thoughts, feelings, and concerns.  allowed the patient to discuss/process difficulties he  experienced over the past few weeks validated his feelings.  Also strongly urged the patient to strictly adhere to medication regimen as he is Paxil was recently increased by BJORN Short.  Also assisted patient in identifying potential behaviors he is engaging in which could exacerbate his symptoms including social isolation and not utilizing positive sources of support.  Also strongly urged the patient to contact this office, call 911, or present to the nearest emergency room if suicidal ideation returns.    Provided unconditional positive regard in a safe, supportive environment.    Allowed patient to freely discuss issues without interruption or judgment. Provided safe, confidential environment to facilitate the development of positive therapeutic relationship and encourage open, honest communication. Assisted patient in identifying risk factors which would indicate the need for higher level of care including thoughts to harm self or others and/or self-harming behavior and encouraged patient to contact this office, call 911, or present to the nearest emergency room should any of these events occur. Discussed crisis intervention services and means to access.  Patient adamantly and convincingly denies current suicidal or homicidal ideation or perceptual disturbance.    Assessment    Patient appears to be struggling with increased symptoms of depression and recent transient suicidal ideation.  In addition, the patient has a long history of suicidal ideation with at least one previous attempt.  The patient's symptoms continue to cause impairment in important areas of functioning.  As result, he would likely be at increased risk for decompensation without ongoing treatment.       Diagnoses and all orders for this visit:    Major depressive disorder, recurrent episode, moderate (CMS/HCC)    Post traumatic stress disorder (PTSD)    Generalized anxiety disorder    Personality disorder               Mental Status  Exam  Hygiene:  good  Dress:  casual  Attitude:  Cooperative  Motor Activity:  Appropriate  Speech:  Normal  Mood:  depressed  Affect:  calm and pleasant  Thought Processes:  Goal directed  Thought Content:  normal  Suicidal Thoughts:  denies  Homicidal Thoughts:  denies  Crisis Safety Plan: yes, to come to the emergency room.  Hallucinations:  denies    Patient's Support Network Includes:  significant other    Progress toward goal: Not at goal    Functional Status: Moderate impairment     Prognosis: Fair with Ongoing Treatment     Plan         Patient will continue in individual outpatient therapy sessions every 4 weeks at the [facility] and pharmacotherapy as scheduled with BJORN Short.  Patient will adhere to medication regimen as prescribed and report any side effects. Patient will contact this office, call 911 or present to the nearest emergency room should suicidal or homicidal ideations occur. Provide Cognitive Behavioral Therapy and Integrative Therapy to improve functioning, maintain stability, and avoid decompensation and the need for higher level of care.          Return in about 3 weeks (around 8/15/2018) for Next scheduled follow up.      This document signed by Bg Simmons LCSW, OPAL July 25, 2018 2:53 PM

## 2018-08-09 ENCOUNTER — HOSPITAL ENCOUNTER (EMERGENCY)
Facility: HOSPITAL | Age: 53
Discharge: HOME | End: 2018-08-09
Attending: EMERGENCY MEDICINE | Admitting: EMERGENCY MEDICINE

## 2018-08-09 VITALS
BODY MASS INDEX: 27.44 KG/M2 | DIASTOLIC BLOOD PRESSURE: 100 MMHG | RESPIRATION RATE: 18 BRPM | HEIGHT: 71 IN | OXYGEN SATURATION: 100 % | SYSTOLIC BLOOD PRESSURE: 153 MMHG | WEIGHT: 196 LBS | TEMPERATURE: 98 F | HEART RATE: 69 BPM

## 2018-08-09 DIAGNOSIS — E86.0 DEHYDRATION: Primary | ICD-10-CM

## 2018-08-09 LAB
ALBUMIN SERPL-MCNC: 4.6 G/DL (ref 3.5–5)
ALBUMIN/GLOB SERPL: 1.6 G/DL (ref 1.5–2.5)
ALP SERPL-CCNC: 80 U/L (ref 40–129)
ALT SERPL W P-5'-P-CCNC: 39 U/L (ref 10–44)
ANION GAP SERPL CALCULATED.3IONS-SCNC: 7.2 MMOL/L (ref 3.6–11.2)
AST SERPL-CCNC: 30 U/L (ref 10–34)
BASOPHILS # BLD AUTO: 0.03 10*3/MM3 (ref 0–0.3)
BASOPHILS NFR BLD AUTO: 0.5 % (ref 0–2)
BILIRUB SERPL-MCNC: 0.4 MG/DL (ref 0.2–1.8)
BUN BLD-MCNC: 11 MG/DL (ref 7–21)
BUN/CREAT SERPL: 10.4 (ref 7–25)
CALCIUM SPEC-SCNC: 8.9 MG/DL (ref 7.7–10)
CHLORIDE SERPL-SCNC: 108 MMOL/L (ref 99–112)
CO2 SERPL-SCNC: 27.8 MMOL/L (ref 24.3–31.9)
CREAT BLD-MCNC: 1.06 MG/DL (ref 0.43–1.29)
DEPRECATED RDW RBC AUTO: 41 FL (ref 37–54)
EOSINOPHIL # BLD AUTO: 0.22 10*3/MM3 (ref 0–0.7)
EOSINOPHIL NFR BLD AUTO: 3.3 % (ref 0–5)
ERYTHROCYTE [DISTWIDTH] IN BLOOD BY AUTOMATED COUNT: 13.4 % (ref 11.5–14.5)
ETHANOL BLD-MCNC: <10 MG/DL
ETHANOL UR QL: <0.01 %
GFR SERPL CREATININE-BSD FRML MDRD: 73 ML/MIN/1.73
GLOBULIN UR ELPH-MCNC: 2.8 GM/DL
GLUCOSE BLD-MCNC: 72 MG/DL (ref 70–110)
HCT VFR BLD AUTO: 41.9 % (ref 42–52)
HGB BLD-MCNC: 14.4 G/DL (ref 14–18)
IMM GRANULOCYTES # BLD: 0.02 10*3/MM3 (ref 0–0.03)
IMM GRANULOCYTES NFR BLD: 0.3 % (ref 0–0.5)
LYMPHOCYTES # BLD AUTO: 1.76 10*3/MM3 (ref 1–3)
LYMPHOCYTES NFR BLD AUTO: 26.7 % (ref 21–51)
MAGNESIUM SERPL-MCNC: 2.4 MG/DL (ref 1.7–2.6)
MCH RBC QN AUTO: 29.2 PG (ref 27–33)
MCHC RBC AUTO-ENTMCNC: 34.4 G/DL (ref 33–37)
MCV RBC AUTO: 85 FL (ref 80–94)
MONOCYTES # BLD AUTO: 0.7 10*3/MM3 (ref 0.1–0.9)
MONOCYTES NFR BLD AUTO: 10.6 % (ref 0–10)
NEUTROPHILS # BLD AUTO: 3.87 10*3/MM3 (ref 1.4–6.5)
NEUTROPHILS NFR BLD AUTO: 58.6 % (ref 30–70)
OSMOLALITY SERPL CALC.SUM OF ELEC: 282.9 MOSM/KG (ref 273–305)
PLATELET # BLD AUTO: 226 10*3/MM3 (ref 130–400)
PMV BLD AUTO: 10.3 FL (ref 6–10)
POTASSIUM BLD-SCNC: 3.2 MMOL/L (ref 3.5–5.3)
PROT SERPL-MCNC: 7.4 G/DL (ref 6–8)
RBC # BLD AUTO: 4.93 10*6/MM3 (ref 4.7–6.1)
SODIUM BLD-SCNC: 143 MMOL/L (ref 135–153)
TROPONIN I SERPL-MCNC: <0.006 NG/ML
WBC NRBC COR # BLD: 6.6 10*3/MM3 (ref 4.5–12.5)

## 2018-08-09 PROCEDURE — 80307 DRUG TEST PRSMV CHEM ANLYZR: CPT | Performed by: EMERGENCY MEDICINE

## 2018-08-09 PROCEDURE — 80053 COMPREHEN METABOLIC PANEL: CPT | Performed by: EMERGENCY MEDICINE

## 2018-08-09 PROCEDURE — 83735 ASSAY OF MAGNESIUM: CPT | Performed by: EMERGENCY MEDICINE

## 2018-08-09 PROCEDURE — 93010 ELECTROCARDIOGRAM REPORT: CPT | Performed by: INTERNAL MEDICINE

## 2018-08-09 PROCEDURE — 84484 ASSAY OF TROPONIN QUANT: CPT | Performed by: EMERGENCY MEDICINE

## 2018-08-09 PROCEDURE — 99284 EMERGENCY DEPT VISIT MOD MDM: CPT

## 2018-08-09 PROCEDURE — 93005 ELECTROCARDIOGRAM TRACING: CPT | Performed by: EMERGENCY MEDICINE

## 2018-08-09 PROCEDURE — 85025 COMPLETE CBC W/AUTO DIFF WBC: CPT | Performed by: EMERGENCY MEDICINE

## 2018-08-09 RX ORDER — AMLODIPINE BESYLATE AND BENAZEPRIL HYDROCHLORIDE 5; 20 MG/1; MG/1
1 CAPSULE ORAL DAILY
COMMUNITY
End: 2019-10-24 | Stop reason: SDUPTHER

## 2018-08-09 RX ORDER — POTASSIUM CHLORIDE 20 MEQ/1
40 TABLET, EXTENDED RELEASE ORAL DAILY
Status: DISCONTINUED | OUTPATIENT
Start: 2018-08-09 | End: 2018-08-09 | Stop reason: HOSPADM

## 2018-08-09 RX ORDER — SODIUM CHLORIDE 0.9 % (FLUSH) 0.9 %
10 SYRINGE (ML) INJECTION AS NEEDED
Status: DISCONTINUED | OUTPATIENT
Start: 2018-08-09 | End: 2018-08-09 | Stop reason: HOSPADM

## 2018-08-09 RX ORDER — PANTOPRAZOLE SODIUM 40 MG/1
40 TABLET, DELAYED RELEASE ORAL DAILY
COMMUNITY
End: 2018-11-21

## 2018-08-09 RX ADMIN — SODIUM CHLORIDE 1000 ML: 9 INJECTION, SOLUTION INTRAVENOUS at 18:43

## 2018-08-09 RX ADMIN — POTASSIUM CHLORIDE 40 MEQ: 1500 TABLET, EXTENDED RELEASE ORAL at 19:59

## 2018-08-09 NOTE — ED PROVIDER NOTES
"Subjective   53-year-old white female presents with possible seizure.  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  When this was completed and was talking to some tinnitus prior to going home.  He had called his wife and told her that he was on the way home.  This is the last thing that he remembers.  No one is here who witnessed this episode, but thirdhand reports say that the patient fell down and possibly had a seizure.  The wife says they told her that \"his eyes rolled back\", but didn't have any clonic activity.  This thinks this may be due to getting hot.  He denies any alcohol or drug use, history of seizures, family history of seizures, head trauma, or other history.  He denies any headache, blurry vision, speech changes, focal weakness, palpitations, chest pain or other complaints.            Review of Systems   All other systems reviewed and are negative.      Past Medical History:   Diagnosis Date   • Anxiety    • Depression    • Hyperlipidemia    • Hypertension    • PTSD (post-traumatic stress disorder)    • Vitamin B12 deficiency        Allergies   Allergen Reactions   • Demerol Hcl [Meperidine]    • Levaquin [Levofloxacin]    • Morphine And Related    • Prozac [Fluoxetine Hcl]        Past Surgical History:   Procedure Laterality Date   • ABDOMINAL SURGERY     • LEG SURGERY Right        Family History   Problem Relation Age of Onset   • Diabetes Mother    • Hypertension Mother    • Stroke Mother    • Heart disease Mother    • Rheum arthritis Father    • Heart disease Other    • Hypertension Other        Social History     Social History   • Marital status: Single     Social History Main Topics   • Smoking status: Former Smoker   • Smokeless tobacco: Never Used   • Alcohol use No      Comment: stopped drinking alcohol   • Drug use: No   • Sexual activity: Defer     Other Topics Concern   • Not on file           Objective   Physical Exam   Constitutional: He " is oriented to person, place, and time. He appears well-developed and well-nourished.   HENT:   Head: Normocephalic and atraumatic.   Cardiovascular: Normal rate, regular rhythm and normal heart sounds.  Exam reveals no gallop and no friction rub.    No murmur heard.  Pulmonary/Chest: Effort normal and breath sounds normal. No respiratory distress. He has no wheezes. He has no rales.   Abdominal: Soft. Bowel sounds are normal. He exhibits no distension. There is no tenderness.   Musculoskeletal: Normal range of motion. He exhibits no edema.   Neurological: He is alert and oriented to person, place, and time. He has normal strength. No cranial nerve deficit or sensory deficit.   Skin: Skin is warm and dry.   Psychiatric: He has a normal mood and affect.   Nursing note and vitals reviewed.      Procedures           ED Course      No orders to display     Labs Reviewed   COMPREHENSIVE METABOLIC PANEL - Abnormal; Notable for the following:        Result Value    Potassium 3.2 (*)     All other components within normal limits   CBC WITH AUTO DIFFERENTIAL - Abnormal; Notable for the following:     Hematocrit 41.9 (*)     MPV 10.3 (*)     Monocyte % 10.6 (*)     All other components within normal limits   TROPONIN (IN-HOUSE) - Normal    Narrative:     Ultra Troponin I Reference Range:         <=0.039 ng/mL: Negative    0.04-0.779 ng/mL: Indeterminate Range. Suspicious of MI.  Clinical correlation required.       >=0.78  ng/mL: Consistent with myocardial injury.  Clinical correlation required.   MAGNESIUM - Normal   OSMOLALITY, CALCULATED - Normal   ETHANOL    Narrative:     >/= 80.0 legally intoxicated   CBC AND DIFFERENTIAL    Narrative:     The following orders were created for panel order CBC & Differential.  Procedure                               Abnormality         Status                     ---------                               -----------         ------                     CBC Auto Differential[[id number]]         Abnormal            Final result                 Please view results for these tests on the individual orders.        Medication List      CONTINUE taking these medications    albuterol 108 (90 Base) MCG/ACT inhaler  Commonly known as:  PROVENTIL HFA;VENTOLIN HFA     amLODIPine-benazepril 5-20 MG per capsule  Commonly known as:  LOTREL 5-20     atorvastatin 20 MG tablet  Commonly known as:  LIPITOR  Take 1 tablet by mouth Daily.     busPIRone 10 MG tablet  Commonly known as:  BUSPAR  Take 1 tablet by mouth 2 (Two) Times a Day.     cholecalciferol 1000 units tablet  Commonly known as:  VITAMIN D3     doxepin 75 MG capsule  Commonly known as:  SINEquan  Take 1-2 by mouth at bedtime     gemfibrozil 600 MG tablet  Commonly known as:  LOPID  Take 1 tablet by mouth 2 (Two) Times a Day Before Meals.     losartan 50 MG tablet  Commonly known as:  COZAAR  Take 1.5 tablets by mouth daily. For:  Hypertension.     omeprazole 20 MG capsule  Commonly known as:  priLOSEC  Take 1 capsule by mouth Daily. For:  Esophageal reflux.     pantoprazole 40 MG EC tablet  Commonly known as:  PROTONIX     PARoxetine 20 MG tablet  Commonly known as:  PAXIL  Take 1 tablet by mouth Daily.     polyethylene glycol packet  Commonly known as:  MIRALAX  Take 17 g by mouth 2 (Two) Times a Day As Needed (constipation).     vitamin D 28095 units capsule capsule  Commonly known as:  ERGOCALCIFEROL  Take 1 capsule by mouth 1 (One) Time Per Week.                    MDM  Number of Diagnoses or Management Options  Dehydration: established and worsening     Amount and/or Complexity of Data Reviewed  Clinical lab tests: reviewed  Tests in the medicine section of CPT®: reviewed    Risk of Complications, Morbidity, and/or Mortality  Presenting problems: high  Diagnostic procedures: high  Management options: high    Patient Progress  Patient progress: improved        Final diagnoses:   Dehydration            Stevo Morales MD  08/10/18 7728

## 2018-08-09 NOTE — ED NOTES
Spoke to geeta at poison control, reports symptoms to look for are wheezing, cough, itchy eyes, recommends albuterol treatment and monitor     Elizabeth Rolle RN  08/09/18 1932

## 2018-08-16 ENCOUNTER — OFFICE VISIT (OUTPATIENT)
Dept: PSYCHIATRY | Facility: CLINIC | Age: 53
End: 2018-08-16

## 2018-08-16 VITALS
WEIGHT: 188.6 LBS | SYSTOLIC BLOOD PRESSURE: 157 MMHG | BODY MASS INDEX: 26.4 KG/M2 | DIASTOLIC BLOOD PRESSURE: 98 MMHG | HEIGHT: 71 IN | HEART RATE: 94 BPM

## 2018-08-16 DIAGNOSIS — F33.1 MAJOR DEPRESSIVE DISORDER, RECURRENT EPISODE, MODERATE (HCC): Primary | ICD-10-CM

## 2018-08-16 DIAGNOSIS — F43.10 POST TRAUMATIC STRESS DISORDER (PTSD): ICD-10-CM

## 2018-08-16 DIAGNOSIS — F41.1 GENERALIZED ANXIETY DISORDER: ICD-10-CM

## 2018-08-16 DIAGNOSIS — F60.9 PERSONALITY DISORDER (HCC): ICD-10-CM

## 2018-08-16 PROCEDURE — 99213 OFFICE O/P EST LOW 20 MIN: CPT | Performed by: NURSE PRACTITIONER

## 2018-08-16 RX ORDER — DOXEPIN HYDROCHLORIDE 75 MG/1
CAPSULE ORAL
Qty: 60 CAPSULE | Refills: 1 | Status: SHIPPED | OUTPATIENT
Start: 2018-08-16 | End: 2018-10-19 | Stop reason: SDUPTHER

## 2018-08-16 RX ORDER — PAROXETINE HYDROCHLORIDE 20 MG/1
20 TABLET, FILM COATED ORAL DAILY
Qty: 30 TABLET | Refills: 1 | Status: SHIPPED | OUTPATIENT
Start: 2018-08-16 | End: 2018-10-19 | Stop reason: SDUPTHER

## 2018-08-16 RX ORDER — BUSPIRONE HYDROCHLORIDE 10 MG/1
10 TABLET ORAL 2 TIMES DAILY
Qty: 60 TABLET | Refills: 1 | Status: SHIPPED | OUTPATIENT
Start: 2018-08-16 | End: 2018-10-19 | Stop reason: SDUPTHER
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
## 2018-08-28 ENCOUNTER — OFFICE VISIT (OUTPATIENT)
Dept: FAMILY MEDICINE CLINIC | Facility: CLINIC | Age: 53
End: 2018-08-28

## 2018-08-28 VITALS
OXYGEN SATURATION: 99 % | HEART RATE: 82 BPM | HEIGHT: 71 IN | SYSTOLIC BLOOD PRESSURE: 138 MMHG | BODY MASS INDEX: 26.32 KG/M2 | WEIGHT: 188 LBS | DIASTOLIC BLOOD PRESSURE: 90 MMHG

## 2018-08-28 DIAGNOSIS — R53.83 OTHER FATIGUE: ICD-10-CM

## 2018-08-28 DIAGNOSIS — R22.1 LUMP IN NECK: ICD-10-CM

## 2018-08-28 DIAGNOSIS — K21.9 GASTROESOPHAGEAL REFLUX DISEASE, ESOPHAGITIS PRESENCE NOT SPECIFIED: ICD-10-CM

## 2018-08-28 DIAGNOSIS — I10 ESSENTIAL HYPERTENSION: ICD-10-CM

## 2018-08-28 DIAGNOSIS — E55.9 VITAMIN D DEFICIENCY: Primary | ICD-10-CM

## 2018-08-28 DIAGNOSIS — E34.9 HYPOTESTOSTERONISM: ICD-10-CM

## 2018-08-28 DIAGNOSIS — E53.8 COBALAMIN DEFICIENCY: ICD-10-CM

## 2018-08-28 DIAGNOSIS — E78.1 PRIMARY HYPERTRIGLYCERIDEMIA: ICD-10-CM

## 2018-08-28 PROCEDURE — 99214 OFFICE O/P EST MOD 30 MIN: CPT | Performed by: NURSE PRACTITIONER

## 2018-08-28 NOTE — PROGRESS NOTES
Subjective   Marilee Taylor is a 53 y.o. male.     Chief Complaint: Follow-up and Hypertension    Erectile Dysfunction   This is a chronic problem. The current episode started more than 1 year ago. The problem is unchanged. The nature of his difficulty is achieving erection and maintaining erection. Non-physiologic factors contributing to erectile dysfunction are a decreased libido. Irritative symptoms do not include frequency, nocturia or urgency. Obstructive symptoms do not include dribbling, straining or a weak stream. Pertinent negatives include no dysuria. Nothing aggravates the symptoms. Past treatments include nothing.   His testosterone level was checked in March 2018 and was low; however, he states that he never received a call regarding his labs.  He would like to have it checked again today as his symptoms are continuing as before.  He does c/o low libido.    Hypertension   This is a chronic problem. The current episode started more than 1 year ago. The problem has been waxing and waning since onset. The problem is controlled. Associated symptoms include anxiety and malaise/fatigue. Pertinent negatives include no chest pain, headaches, palpitations or shortness of breath. There are no associated agents to hypertension. Risk factors for coronary artery disease include dyslipidemia, male gender and sedentary lifestyle. Past treatments include alpha 1 blockers, statins and fibric acid derivatives. The current treatment provides significant improvement. Compliance problems include diet and exercise.  Current antihypertension treatment includes alpha 1 blockers, statins and fibric acid derivatives.   Hyperlipidemia   This is a chronic problem. The current episode started more than 1 year ago. The problem is controlled. Factors aggravating his hyperlipidemia include fatty foods. Pertinent negatives include no chest pain or shortness of breath. Current antihyperlipidemic treatment includes fibric acid derivatives  and statins. The current treatment provides significant improvement of lipids. Compliance problems include adherence to exercise and adherence to diet.  Risk factors for coronary artery disease include dyslipidemia, hypertension, male sex and a sedentary lifestyle. His last lipid panel was done in March 2018 and was very elevated.  Pt states that he did not receive any information regarding his labs and has not taken any medication.  He states that he had been drinking 3-4 energy drinks daily and has stopped them as of two weeks ago.  He apparently went to the ER for evaluation of syncope and possible seizure and he is convinced the energy drinks caused the issue.  He has had no further instances since stopping the energy drinks.  I have discussed with him today the risks of the energy drinks and have told him that he should never drink them.  He states understanding and is willing to completely stop them.  Will recheck his labs today.  Depression   Visit Type: follow-up (pt follows with psychiatry)  Patient presents with the following symptoms: decreased concentration and depressed mood.  Patient is not experiencing: palpitations and shortness of breath.  Frequency of symptoms: occasionally   Severity: moderate   Sleep quality: fair  Nighttime awakenings: occasional  Compliance with medications:  %  Heartburn   He complains of abdominal pain and heartburn. He reports no chest pain. This is a chronic problem. The current episode started more than 1 year ago. The problem occurs occasionally. The problem has been waxing and waning. The heartburn does not wake him from sleep. The heartburn does not limit his activity. The heartburn doesn't change with position. The symptoms are aggravated by caffeine and certain foods. Risk factors include caffeine use and lack of exercise. He has tried a PPI for the symptoms. The treatment provided significant relief.   Neck Lump  Pt states that he does have a lump in his right  "neck area that he has had for several years.  The area is growing and is tender if pressed.  He would like to have this area \"looked at.\"    Family History   Problem Relation Age of Onset   • Diabetes Mother    • Hypertension Mother    • Stroke Mother    • Heart disease Mother    • Rheum arthritis Father    • Heart disease Other    • Hypertension Other        Social History     Social History   • Marital status: Single     Spouse name: N/A   • Number of children: N/A   • Years of education: N/A     Occupational History   • Not on file.     Social History Main Topics   • Smoking status: Former Smoker   • Smokeless tobacco: Never Used   • Alcohol use No      Comment: stopped drinking alcohol   • Drug use: No   • Sexual activity: Defer     Other Topics Concern   • Not on file     Social History Narrative   • No narrative on file       Past Medical History:   Diagnosis Date   • Anxiety    • Depression    • Hyperlipidemia    • Hypertension    • PTSD (post-traumatic stress disorder)    • Vitamin B12 deficiency        Review of Systems   HENT: Negative.    Respiratory: Negative.    Cardiovascular: Negative.    Gastrointestinal: Negative.    Genitourinary: Positive for decreased libido. Negative for difficulty urinating, dysuria, frequency, nocturia, penile pain, testicular pain and urgency.   Musculoskeletal: Negative.    Skin: Negative.    Neurological: Negative.    Psychiatric/Behavioral: Negative.        Objective   Physical Exam   Constitutional: He is oriented to person, place, and time. He appears well-developed and well-nourished.   Neck: Normal range of motion. Neck supple.   Cardiovascular: Normal rate, regular rhythm and normal heart sounds.    Pulmonary/Chest: Effort normal and breath sounds normal.   Neurological: He is alert and oriented to person, place, and time.   Skin: Skin is warm and dry.   Baseball size soft indurated lump to right side of neck   Psychiatric: He has a normal mood and affect. His " "behavior is normal. Judgment and thought content normal.   Nursing note and vitals reviewed.      Procedures    Vitals: Blood pressure 138/90, pulse 82, height 180.3 cm (71\"), weight 85.3 kg (188 lb), SpO2 99 %.    Allergies:   Allergies   Allergen Reactions   • Demerol Hcl [Meperidine]    • Levaquin [Levofloxacin]    • Morphine And Related    • Prozac [Fluoxetine Hcl]         During this visit the following were done:  Labs Reviewed []    Labs Ordered []    Radiology Reports Reviewed []    Radiology Ordered []    PCP Records Reviewed []    Referring Provider Records Reviewed []    ER Records Reviewed []    Hospital Records Reviewed []    History Obtained From Family []    Radiology Images Reviewed []    Other Reviewed []    Records Requested []      Assessment/Plan   Marilee was seen today for follow-up and hypertension.    Diagnoses and all orders for this visit:    Vitamin D deficiency  -     CBC & Differential  -     Comprehensive Metabolic Panel  -     Lipid Panel  -     Magnesium  -     TSH  -     Vitamin B12  -     Vitamin D 25 Hydroxy  -     Testosterone, Free, Total    Essential hypertension  -     CBC & Differential  -     Comprehensive Metabolic Panel  -     Lipid Panel  -     Magnesium  -     TSH  -     Vitamin B12  -     Vitamin D 25 Hydroxy  -     Testosterone, Free, Total    Primary hypertriglyceridemia  -     CBC & Differential  -     Comprehensive Metabolic Panel  -     Lipid Panel  -     Magnesium  -     TSH  -     Vitamin B12  -     Vitamin D 25 Hydroxy  -     Testosterone, Free, Total    Gastroesophageal reflux disease, esophagitis presence not specified  -     CBC & Differential  -     Comprehensive Metabolic Panel  -     Lipid Panel  -     Magnesium  -     TSH  -     Vitamin B12  -     Vitamin D 25 Hydroxy  -     Testosterone, Free, Total    Cobalamin deficiency  -     CBC & Differential  -     Comprehensive Metabolic Panel  -     Lipid Panel  -     Magnesium  -     TSH  -     Vitamin B12  -     " Vitamin D 25 Hydroxy  -     Testosterone, Free, Total    Other fatigue  -     CBC & Differential  -     Comprehensive Metabolic Panel  -     Lipid Panel  -     Magnesium  -     TSH  -     Vitamin B12  -     Vitamin D 25 Hydroxy  -     Testosterone, Free, Total    Hypotestosteronism  -     CBC & Differential  -     Comprehensive Metabolic Panel  -     Lipid Panel  -     Magnesium  -     TSH  -     Vitamin B12  -     Vitamin D 25 Hydroxy  -     Testosterone, Free, Total    Lump in neck  -     US Head Neck Soft Tissue; Future

## 2018-08-29 LAB
25(OH)D3 SERPL-MCNC: 19 NG/ML
ALBUMIN SERPL-MCNC: 4.1 G/DL (ref 3.5–5)
ALBUMIN/GLOB SERPL: 1.7 G/DL (ref 1.5–2.5)
ALP SERPL-CCNC: 85 U/L (ref 40–129)
ALT SERPL W P-5'-P-CCNC: 32 U/L (ref 10–44)
ANION GAP SERPL CALCULATED.3IONS-SCNC: 10.8 MMOL/L (ref 3.6–11.2)
AST SERPL-CCNC: 24 U/L (ref 10–34)
BASOPHILS # BLD AUTO: 0.04 10*3/MM3 (ref 0–0.3)
BASOPHILS NFR BLD AUTO: 0.5 % (ref 0–2)
BILIRUB SERPL-MCNC: 0.3 MG/DL (ref 0.2–1.8)
BUN BLD-MCNC: 14 MG/DL (ref 7–21)
BUN/CREAT SERPL: 15.6 (ref 7–25)
CALCIUM SPEC-SCNC: 8.7 MG/DL (ref 7.7–10)
CHLORIDE SERPL-SCNC: 110 MMOL/L (ref 99–112)
CHOLEST SERPL-MCNC: 200 MG/DL (ref 0–200)
CO2 SERPL-SCNC: 22.2 MMOL/L (ref 24.3–31.9)
CREAT BLD-MCNC: 0.9 MG/DL (ref 0.43–1.29)
DEPRECATED RDW RBC AUTO: 42.1 FL (ref 37–54)
EOSINOPHIL # BLD AUTO: 0.23 10*3/MM3 (ref 0–0.7)
EOSINOPHIL NFR BLD AUTO: 3.1 % (ref 0–5)
ERYTHROCYTE [DISTWIDTH] IN BLOOD BY AUTOMATED COUNT: 13.4 % (ref 11.5–14.5)
GFR SERPL CREATININE-BSD FRML MDRD: 88 ML/MIN/1.73
GLOBULIN UR ELPH-MCNC: 2.4 GM/DL
GLUCOSE BLD-MCNC: 110 MG/DL (ref 70–110)
HCT VFR BLD AUTO: 40.7 % (ref 42–52)
HDLC SERPL-MCNC: 28 MG/DL (ref 60–100)
HGB BLD-MCNC: 13.9 G/DL (ref 14–18)
IMM GRANULOCYTES # BLD: 0.02 10*3/MM3 (ref 0–0.03)
IMM GRANULOCYTES NFR BLD: 0.3 % (ref 0–0.5)
LDLC SERPL CALC-MCNC: ABNORMAL MG/DL (ref 0–100)
LDLC/HDLC SERPL: ABNORMAL {RATIO}
LYMPHOCYTES # BLD AUTO: 2.02 10*3/MM3 (ref 1–3)
LYMPHOCYTES NFR BLD AUTO: 27.1 % (ref 21–51)
MAGNESIUM SERPL-MCNC: 2.2 MG/DL (ref 1.7–2.6)
MCH RBC QN AUTO: 29.8 PG (ref 27–33)
MCHC RBC AUTO-ENTMCNC: 34.2 G/DL (ref 33–37)
MCV RBC AUTO: 87.3 FL (ref 80–94)
MONOCYTES # BLD AUTO: 0.63 10*3/MM3 (ref 0.1–0.9)
MONOCYTES NFR BLD AUTO: 8.4 % (ref 0–10)
NEUTROPHILS # BLD AUTO: 4.52 10*3/MM3 (ref 1.4–6.5)
NEUTROPHILS NFR BLD AUTO: 60.6 % (ref 30–70)
OSMOLALITY SERPL CALC.SUM OF ELEC: 286.1 MOSM/KG (ref 273–305)
PLATELET # BLD AUTO: 229 10*3/MM3 (ref 130–400)
PMV BLD AUTO: 10.3 FL (ref 6–10)
POTASSIUM BLD-SCNC: 3.1 MMOL/L (ref 3.5–5.3)
PROT SERPL-MCNC: 6.5 G/DL (ref 6–8)
RBC # BLD AUTO: 4.66 10*6/MM3 (ref 4.7–6.1)
SODIUM BLD-SCNC: 143 MMOL/L (ref 135–153)
TRIGL SERPL-MCNC: 607 MG/DL (ref 0–150)
TSH SERPL DL<=0.05 MIU/L-ACNC: 1.64 MIU/ML (ref 0.55–4.78)
VIT B12 BLD-MCNC: 337 PG/ML (ref 211–911)
VLDLC SERPL-MCNC: ABNORMAL MG/DL
WBC NRBC COR # BLD: 7.46 10*3/MM3 (ref 4.5–12.5)

## 2018-08-29 PROCEDURE — 80061 LIPID PANEL: CPT | Performed by: NURSE PRACTITIONER

## 2018-08-29 PROCEDURE — 80053 COMPREHEN METABOLIC PANEL: CPT | Performed by: NURSE PRACTITIONER

## 2018-08-29 PROCEDURE — 83735 ASSAY OF MAGNESIUM: CPT | Performed by: NURSE PRACTITIONER

## 2018-08-29 PROCEDURE — 82607 VITAMIN B-12: CPT | Performed by: NURSE PRACTITIONER

## 2018-08-29 PROCEDURE — 84443 ASSAY THYROID STIM HORMONE: CPT | Performed by: NURSE PRACTITIONER

## 2018-08-29 PROCEDURE — 36415 COLL VENOUS BLD VENIPUNCTURE: CPT | Performed by: NURSE PRACTITIONER

## 2018-08-29 PROCEDURE — 85025 COMPLETE CBC W/AUTO DIFF WBC: CPT | Performed by: NURSE PRACTITIONER

## 2018-08-29 PROCEDURE — 82306 VITAMIN D 25 HYDROXY: CPT | Performed by: NURSE PRACTITIONER

## 2018-08-29 PROCEDURE — 84402 ASSAY OF FREE TESTOSTERONE: CPT | Performed by: NURSE PRACTITIONER

## 2018-08-29 PROCEDURE — 84403 ASSAY OF TOTAL TESTOSTERONE: CPT | Performed by: NURSE PRACTITIONER

## 2018-08-29 RX ORDER — FENOFIBRATE 145 MG/1
145 TABLET, COATED ORAL DAILY
Qty: 30 TABLET | Refills: 5 | Status: SHIPPED | OUTPATIENT
Start: 2018-08-29 | End: 2019-11-21 | Stop reason: SDUPTHER

## 2018-08-29 RX ORDER — POTASSIUM CHLORIDE 20 MEQ/1
20 TABLET, EXTENDED RELEASE ORAL 2 TIMES DAILY
Qty: 4 TABLET | Refills: 0 | Status: SHIPPED | OUTPATIENT
Start: 2018-08-29 | End: 2018-10-24 | Stop reason: SDUPTHER

## 2018-08-29 RX ORDER — ERGOCALCIFEROL 1.25 MG/1
50000 CAPSULE ORAL WEEKLY
Qty: 4 CAPSULE | Refills: 2 | Status: SHIPPED | OUTPATIENT
Start: 2018-08-29 | End: 2019-01-11

## 2018-08-29 NOTE — PROGRESS NOTES
His triglycerides are much better but still elevated.  They are down to 607.  I am sending a script for fenofibrate to take daily.  Start taking it now and recheck lipid panel in 3 months.  His K+ is still low and I have sent K+ supplement to pharmacy also.  Need to recheck it in one week.   Vit D is low.  Script sent to pharmacy for weekly supplementation.  Please let him know.

## 2018-08-30 ENCOUNTER — TELEPHONE (OUTPATIENT)
Dept: FAMILY MEDICINE CLINIC | Facility: CLINIC | Age: 53
End: 2018-08-30

## 2018-08-30 DIAGNOSIS — E87.6 HYPOKALEMIA: Primary | ICD-10-CM

## 2018-08-30 LAB
TESTOST FREE SERPL-MCNC: 5.5 PG/ML (ref 7.2–24)
TESTOST SERPL-MCNC: 196 NG/DL (ref 264–916)

## 2018-08-30 NOTE — TELEPHONE ENCOUNTER
----- Message from BJORN Vincent sent at 8/29/2018  5:56 PM EDT -----  His triglycerides are much better but still elevated.  They are down to 607.  I am sending a script for fenofibrate to take daily.  Start taking it now and recheck lipid panel in 3 months.  His K+ is still low and I have sent K+ supplement to pharmacy also.  Need to recheck it in one week.   Vit D is low.  Script sent to pharmacy for weekly supplementation.  Please let him know.      Left a message to return call.      Patient returned call & verbalized understanding.

## 2018-08-31 DIAGNOSIS — N52.9 ERECTILE DYSFUNCTION, UNSPECIFIED ERECTILE DYSFUNCTION TYPE: ICD-10-CM

## 2018-08-31 DIAGNOSIS — E34.9 HYPOTESTOSTERONEMIA: Primary | ICD-10-CM

## 2018-09-06 ENCOUNTER — HOSPITAL ENCOUNTER (OUTPATIENT)
Dept: ULTRASOUND IMAGING | Facility: HOSPITAL | Age: 53
Discharge: HOME | End: 2018-09-06
Admitting: NURSE PRACTITIONER

## 2018-09-06 DIAGNOSIS — R22.1 LUMP IN NECK: ICD-10-CM

## 2018-09-06 PROCEDURE — 76536 US EXAM OF HEAD AND NECK: CPT | Performed by: RADIOLOGY

## 2018-09-06 PROCEDURE — 76536 US EXAM OF HEAD AND NECK: CPT

## 2018-09-07 ENCOUNTER — OFFICE VISIT (OUTPATIENT)
Dept: UROLOGY | Facility: CLINIC | Age: 53
End: 2018-09-07

## 2018-09-07 ENCOUNTER — TELEPHONE (OUTPATIENT)
Dept: FAMILY MEDICINE CLINIC | Facility: CLINIC | Age: 53
End: 2018-09-07

## 2018-09-07 VITALS — HEIGHT: 71 IN | BODY MASS INDEX: 26.32 KG/M2 | WEIGHT: 188 LBS

## 2018-09-07 DIAGNOSIS — R31.0 GROSS HEMATURIA: Primary | ICD-10-CM

## 2018-09-07 DIAGNOSIS — R22.1 NECK MASS: Primary | ICD-10-CM

## 2018-09-07 DIAGNOSIS — N42.9 DISORDER OF PROSTATE: ICD-10-CM

## 2018-09-07 DIAGNOSIS — R79.89 LOW TESTOSTERONE: ICD-10-CM

## 2018-09-07 LAB — PSA SERPL-MCNC: 0.75 NG/ML (ref 0–4)

## 2018-09-07 PROCEDURE — 84153 ASSAY OF PSA TOTAL: CPT | Performed by: UROLOGY

## 2018-09-07 PROCEDURE — 36415 COLL VENOUS BLD VENIPUNCTURE: CPT | Performed by: UROLOGY

## 2018-09-07 PROCEDURE — 99204 OFFICE O/P NEW MOD 45 MIN: CPT | Performed by: UROLOGY

## 2018-09-07 RX ORDER — SULFAMETHOXAZOLE AND TRIMETHOPRIM 800; 160 MG/1; MG/1
1 TABLET ORAL 2 TIMES DAILY
Qty: 60 TABLET | Refills: 2 | Status: SHIPPED | OUTPATIENT
Start: 2018-09-07 | End: 2018-10-05

## 2018-09-07 RX ORDER — TESTOSTERONE CYPIONATE 200 MG/ML
INJECTION, SOLUTION INTRAMUSCULAR
Qty: 10 ML | Refills: 2 | Status: SHIPPED | OUTPATIENT
Start: 2018-09-07 | End: 2019-10-24

## 2018-09-07 NOTE — TELEPHONE ENCOUNTER
----- Message from BJORN Vincent sent at 9/7/2018  9:23 AM EDT -----  US doesn't show anything but he does have a distinct lump in his neck.  I suggest he have a CT.  Is he agreeable?      Patient notified & is agreeable to CT Scan.

## 2018-09-07 NOTE — PROGRESS NOTES
US doesn't show anything but he does have a distinct lump in his neck.  I suggest he have a CT.  Is he agreeable?

## 2018-09-07 NOTE — PROGRESS NOTES
"Chief Complaint:       Low Testosterone    HPI:   53 y.o. male.  53-year-old white male referred with low testosterone 196.  He has extensive positive JU-androgen deficiency in the age male questionnaire  The patient was queried regarding the androgen deficiency in the age male questionnaire.  This is a validated questionnaire that was performed on a set of 314 [employer] male physicians when it was positive it correlated directly with a 94% chance of low testosterone.  Patient indicates there is a decrease in libido or sex drive, a lack of energy, Decreased  strength and endurance, a decreased \"enjoyment of life\", sad and grumpy feelings with significant difficulty maintaining erections.  He is also been a recent deterioration regarding work performance.  He said prior surgeries he is hypertension reflux.  He has a history of hypertension.    Past Medical History:        Past Medical History:   Diagnosis Date   • Anxiety    • Depression    • Hyperlipidemia    • Hypertension    • PTSD (post-traumatic stress disorder)    • Vitamin B12 deficiency          Current Meds:     Current Outpatient Prescriptions   Medication Sig Dispense Refill   • albuterol (PROVENTIL HFA;VENTOLIN HFA) 108 (90 BASE) MCG/ACT inhaler Inhale 1-2 puffs. Every 4-6 hours as needed and as directed.     • amLODIPine-benazepril (LOTREL 5-20) 5-20 MG per capsule Take 1 capsule by mouth Daily.     • atorvastatin (LIPITOR) 20 MG tablet Take 1 tablet by mouth Daily. 30 tablet 5   • busPIRone (BUSPAR) 10 MG tablet Take 1 tablet by mouth 2 (Two) Times a Day. 60 tablet 1   • cholecalciferol (VITAMIN D3) 1000 units tablet Take 1,000 Units by mouth Daily.     • doxepin (SINEquan) 75 MG capsule Take 1-2 by mouth at bedtime 60 capsule 1   • fenofibrate (TRICOR) 145 MG tablet Take 1 tablet by mouth Daily. 30 tablet 5   • losartan (COZAAR) 50 MG tablet Take 1.5 tablets by mouth daily. For:  Hypertension. 45 tablet 5   • omeprazole (priLOSEC) 20 MG capsule Take 1 " capsule by mouth Daily. For:  Esophageal reflux. 90 capsule 1   • pantoprazole (PROTONIX) 40 MG EC tablet Take 40 mg by mouth Daily.     • PARoxetine (PAXIL) 20 MG tablet Take 1 tablet by mouth Daily. 30 tablet 1   • polyethylene glycol (MIRALAX) packet Take 17 g by mouth 2 (Two) Times a Day As Needed (constipation). 100 each 2   • potassium chloride (K-DUR,KLOR-CON) 20 MEQ CR tablet Take 1 tablet by mouth 2 (Two) Times a Day. 4 tablet 0   • vitamin D (ERGOCALCIFEROL) 00232 units capsule capsule Take 1 capsule by mouth 1 (One) Time Per Week. 4 capsule 2     Current Facility-Administered Medications   Medication Dose Route Frequency Provider Last Rate Last Dose   • cyanocobalamin injection 1,000 mcg  1,000 mcg Intramuscular Q28 Days Claudette Randolph APRN   1,000 mcg at 09/27/17 1443        Allergies:      Allergies   Allergen Reactions   • Demerol Hcl [Meperidine]    • Levaquin [Levofloxacin]    • Morphine And Related    • Prozac [Fluoxetine Hcl]         Past Surgical History:     Past Surgical History:   Procedure Laterality Date   • ABDOMINAL SURGERY     • LEG SURGERY Right          Social History:     Social History     Social History   • Marital status: Single     Spouse name: N/A   • Number of children: N/A   • Years of education: N/A     Occupational History   • Not on file.     Social History Main Topics   • Smoking status: Former Smoker   • Smokeless tobacco: Never Used   • Alcohol use No      Comment: stopped drinking alcohol   • Drug use: No   • Sexual activity: Defer     Other Topics Concern   • Not on file     Social History Narrative   • No narrative on file       Family History:     Family History   Problem Relation Age of Onset   • Diabetes Mother    • Hypertension Mother    • Stroke Mother    • Heart disease Mother    • Rheum arthritis Father    • Heart disease Other    • Hypertension Other        Review of Systems:     Review of Systems   Constitutional: Negative.  Negative for chills, fatigue  and fever.   HENT: Negative.    Eyes: Negative.    Respiratory: Negative.  Negative for cough, shortness of breath and wheezing.    Cardiovascular: Negative.  Negative for leg swelling.   Gastrointestinal: Negative.  Negative for abdominal pain, nausea and vomiting.   Endocrine: Negative.    Musculoskeletal: Negative.  Negative for back pain and joint swelling.   Allergic/Immunologic: Negative.    Neurological: Negative.  Negative for dizziness and headaches.   Hematological: Negative.    Psychiatric/Behavioral: Negative.  Negative for confusion.       Physical Exam:     Physical Exam   Constitutional: He is oriented to person, place, and time. He appears well-developed and well-nourished.   HENT:   Head: Normocephalic and atraumatic.   Eyes: Pupils are equal, round, and reactive to light. Conjunctivae and EOM are normal.   Neck: Normal range of motion.   Cardiovascular: Normal rate, regular rhythm, normal heart sounds and intact distal pulses.    Pulmonary/Chest: Effort normal and breath sounds normal.   Abdominal: Soft. Bowel sounds are normal.   Genitourinary:   Genitourinary Comments: Soft nontender abdomen with no organomegaly, rigidity, or tenderness.  He has normal external genitalia and uncircumcised phallus with a freely movable foreskin bilaterally descended testes without masses there is no inguinal hernias adenopathy or abnormalities he had good rectal tone and a large smooth firm prostate.  There is no nodularity or any suspicious rectal abnormalities     Musculoskeletal: Normal range of motion.   Neurological: He is alert and oriented to person, place, and time. He has normal reflexes.   Skin: Skin is warm and dry.   Psychiatric: He has a normal mood and affect. His behavior is normal. Judgment and thought content normal.   Nursing note and vitals reviewed.      I have reviewed the following portions of the patient's history: allergies, current medications, past family history, past medical history,  past social history, past surgical history, problem list and ROS and confirm it's accurate.      Procedure:       Assessment/Plan:   Low TestosteroneThis pleasant male patient presents today with signs and symptoms that are consistent with low testosterone he has positive Corky questionnaire by history this includes both the sexual and nonsexual side effects.  Sexual side effects include inability to achieve and maintain an erection, in ability to maintain his erection and decreased interest and sexual activity.  Nonsexual symptomatology includes fatigue, difficulty completing a job, tiredness.  He has a discussion of the various forms testosterone available including parenteral, topical, and the form of a patch.  We discussed the efficacy of the gels, and the injections.  As well as the cost and benefits analysis.  We discussed the the studies a talked about heart disease and its effect on prostate cancer both of which are negligible.  He gives verbal consent to proceed with treatment.  He understands the risks and benefits of length he also completed his attempts at fertility he understands the partial effect on spermatogenesis     Patient's Body mass index is 26.23 kg/m². BMI is above normal parameters. Recommendations include: educational material.          This document has been electronically signed by ROBERT GUTIERREZ MD September 7, 2018 11:00 AM

## 2018-09-19 ENCOUNTER — OFFICE VISIT (OUTPATIENT)
Dept: PSYCHIATRY | Facility: CLINIC | Age: 53
End: 2018-09-19

## 2018-09-19 ENCOUNTER — HOSPITAL ENCOUNTER (OUTPATIENT)
Dept: CT IMAGING | Facility: HOSPITAL | Age: 53
Discharge: HOME | End: 2018-09-19
Admitting: NURSE PRACTITIONER

## 2018-09-19 DIAGNOSIS — F60.9 PERSONALITY DISORDER (HCC): ICD-10-CM

## 2018-09-19 DIAGNOSIS — Z63.0 PARTNER RELATIONSHIP PROBLEM: ICD-10-CM

## 2018-09-19 DIAGNOSIS — R22.1 NECK MASS: ICD-10-CM

## 2018-09-19 DIAGNOSIS — F41.1 GENERALIZED ANXIETY DISORDER: ICD-10-CM

## 2018-09-19 DIAGNOSIS — F33.1 MAJOR DEPRESSIVE DISORDER, RECURRENT EPISODE, MODERATE (HCC): Primary | ICD-10-CM

## 2018-09-19 DIAGNOSIS — F43.10 POST TRAUMATIC STRESS DISORDER (PTSD): ICD-10-CM

## 2018-09-19 PROCEDURE — 90834 PSYTX W PT 45 MINUTES: CPT | Performed by: SOCIAL WORKER

## 2018-09-19 PROCEDURE — 70490 CT SOFT TISSUE NECK W/O DYE: CPT

## 2018-09-19 PROCEDURE — 70490 CT SOFT TISSUE NECK W/O DYE: CPT | Performed by: RADIOLOGY

## 2018-09-19 SDOH — SOCIAL STABILITY - SOCIAL INSECURITY: PROBLEMS IN RELATIONSHIP WITH SPOUSE OR PARTNER: Z63.0

## 2018-09-19 NOTE — PROGRESS NOTES
Date of Service: September 19, 2018  Time In: 1:30 PM  Time Out: 2:15 PM      PROGRESS NOTE  Data:  Marilee Taylor is a 53 y.o. male who met with the undersigned for a regularly scheduled individual outpatient therapy session at the [facility] for follow-up of depression, PTSD, and anxiety.     HPI: The patient reports he continues to struggle with depressed mood, anhedonia, anergia, periods of hopelessness, periodic insomnia, and periods of social isolation.  Patient also reports ongoing periodic passive suicidal ideation but adamantly denies any intent or plan.  The patient rates current symptoms of depression at  7 on a scale of 1-10 with 10 being most severe.  Patient also continues to struggle with anxiety including anxious mood, feeling on edge, feeling overwhelmed, increased heart rate, shortness of breath, mind goes blank, and a sense of impending doom.  Patient rates current symptoms of anxiety at a 5 on a scale 1-10 with 10 being most severe.  Patient continues to exhibit avoidance behavior and states he has significant difficulty in social situations.  Patient also presents with ongoing symptoms of PTSD  including unwanted thoughts, flashbacks, hypervigilance, increased startle response, and periodic traumatic dreams.  Patient rates current symptoms of PTSD at a 5 on a scale 1-10 with 10 being most severe.  Patient also reports he is struggling with relationship with his girlfriend of approximately 7 years and states her constant negativity has a negative impact on his mood.  Patient also states she has made derogatory comments and called him names.  Patient reports he is considering ending the relationship for his own well-being.  Patient also reports it was recently determined he needs to be on testosterone replacement treatment but states he is struggling with being able to give himself shots.  He reports this causes significant anxiety and states although he has had the prescription for  "approximately 2 weeks he has been unable to begin it as of yet.  Patient reports he continues to adhere to medication regimen as prescribed.  Patient adamantly and convincingly denies suicidal ideation and vehemently denies any substance use.        Clinical Maneuvering/Intervention:  Assisted patient in processing above session content; acknowledged and normalized patient’s thoughts, feelings, and concerns.  Utilized motivational techniques including complex affect is to assist the patient in making a pros and cons list of his testosterone treatment and encouraged him to remind himself he can tolerate having multiple tattoos he can give himself a shot with a small 25-gauge needle.  Also allowed the patient to discuss/vent ongoing difficulties in his relationship and validated his feelings.  Also validated the patient's belief that no one should call him derogatory names.  Encouraged the patient to remind himself that every human has a right to determine what they require to be happy and to try to achieve it.  Also discussed the importance of communication and any didactic relationship and discussed and demonstrated use of \"I\" statements and reflective listening.   Provided unconditional positive regard in a safe, supportive environment.    Allowed patient to freely discuss issues without interruption or judgment. Provided safe, confidential environment to facilitate the development of positive therapeutic relationship and encourage open, honest communication. Assisted patient in identifying risk factors which would indicate the need for higher level of care including thoughts to harm self or others and/or self-harming behavior and encouraged patient to contact this office, call 911, or present to the nearest emergency room should any of these events occur. Discussed crisis intervention services and means to access.  Patient adamantly and convincingly denies current suicidal or homicidal ideation or perceptual " disturbance.    Assessment    Patient continues to struggle with depression, anxiety, and post traumatic stress disorder which currently appears to be exacerbated by relationship strain.  In addition, the patient has a long history of suicidal ideation with at least one previous attempt.  The patient's symptoms continue to cause impairment in important areas of functioning.  As result, he would likely be at increased risk for decompensation without ongoing treatment.       Diagnoses and all orders for this visit:    Major depressive disorder, recurrent episode, moderate (CMS/HCC)    Generalized anxiety disorder    Post traumatic stress disorder (PTSD)    Personality disorder               Mental Status Exam  Hygiene:  good  Dress:  casual  Attitude:  Cooperative  Motor Activity:  Appropriate  Speech:  Normal  Mood:  depressed  Affect:  calm and pleasant  Thought Processes:  Goal directed  Thought Content:  normal  Suicidal Thoughts:  denies  Homicidal Thoughts:  denies  Crisis Safety Plan: yes, to come to the emergency room.  Hallucinations:  denies    Patient's Support Network Includes:  significant other    Progress toward goal: Not at goal    Functional Status: Moderate impairment     Prognosis: Fair with Ongoing Treatment     Plan         Patient will continue in individual outpatient therapy sessions every 3 weeks at the [facility] and pharmacotherapy as scheduled with BJORN Short.  Patient will adhere to medication regimen as prescribed and report any side effects. Patient will contact this office, call 911 or present to the nearest emergency room should suicidal or homicidal ideations occur. Provide Cognitive Behavioral Therapy and Integrative Therapy to improve functioning, maintain stability, and avoid decompensation and the need for higher level of care.          Return in about 3 weeks (around 10/10/2018) for Next scheduled follow up.      This document signed by Bg Simmons LCSW, [facility]  September 19, 2018 4:23 PM

## 2018-09-21 ENCOUNTER — TELEPHONE (OUTPATIENT)
Dept: FAMILY MEDICINE CLINIC | Facility: CLINIC | Age: 53
End: 2018-09-21

## 2018-09-23 NOTE — TELEPHONE ENCOUNTER
CT is negative also.  It does not show a mass.  I would recommend just watching the area.  This apparently is just normal tissue according to radiology.  Continue to monitor for now.

## 2018-09-24 NOTE — TELEPHONE ENCOUNTER
CT is negative also.  It does not show a mass.  I would recommend just watching the area.  This apparently is just normal tissue according to radiology.  Continue to monitor for now.       Left a message to return call.      Patient returned call & verbalized understanding.

## 2018-10-10 ENCOUNTER — OFFICE VISIT (OUTPATIENT)
Dept: PSYCHIATRY | Facility: CLINIC | Age: 53
End: 2018-10-10

## 2018-10-10 DIAGNOSIS — F41.1 GENERALIZED ANXIETY DISORDER: ICD-10-CM

## 2018-10-10 DIAGNOSIS — F33.1 MAJOR DEPRESSIVE DISORDER, RECURRENT EPISODE, MODERATE (HCC): Primary | ICD-10-CM

## 2018-10-10 DIAGNOSIS — F43.10 POST TRAUMATIC STRESS DISORDER (PTSD): ICD-10-CM

## 2018-10-10 PROCEDURE — 90834 PSYTX W PT 45 MINUTES: CPT | Performed by: SOCIAL WORKER

## 2018-10-10 NOTE — PROGRESS NOTES
Date of Service: October 10, 2018  Time In: 2:55 PM  Time Out: 3:40 PM      PROGRESS NOTE  Data:  Marilee Taylor is a 53 y.o. male who met with the undersigned for a regularly scheduled individual outpatient therapy session at the [facility] for follow-up of depression, PTSD, and anxiety.     HPI: The patient reports he continues to struggle with depressed mood, anhedonia, anergia, periods of hopelessness, periodic insomnia, and periods of social isolation.  Patient also reports ongoing periodic passive suicidal ideation but adamantly denies any intent or plan.  The patient rates current symptoms of depression at  6 on a scale of 1-10 with 10 being most severe.  Patient also continues to struggle with anxiety including anxious mood, feeling on edge, feeling overwhelmed, increased heart rate, shortness of breath, mind goes blank, and a sense of impending doom.  Patient rates current symptoms of anxiety at a 4/5 on a scale 1-10 with 10 being most severe.   Patient also presents with ongoing symptoms of PTSD  including unwanted thoughts, flashbacks, hypervigilance, increased startle response, and periodic traumatic dreams.  Patient rates current symptoms of PTSD at a 5 on a scale 1-10 with 10 being most severe.  Patient reports he continues to have difficulty with his girlfriend and states he has decided he will leave the relationship.  However, he states his girlfriend son was recently in a motorcycle accident and states he is planning to wait until she is under less stress.  Patient reports he was able to begin testosterone treatment and states he discovered it was relatively easy to give himself an injection twice weekly.  Patient reports he continues to adhere to medication regimen as prescribed.  Patient adamantly and convincingly denies suicidal ideation and vehemently denies any substance use.        Clinical Maneuvering/Intervention:  Assisted patient in processing above session content; acknowledged and  normalized patient’s thoughts, feelings, and concerns.  praised the patient for his willingness to engage in activities he wants thought impossible which is evidenced by his ability to inject testosterone twice weekly.  This session was primarily focused on behavioral activation by encouraging the patient to continue to actively seek enjoyable activities he can engage in on a regular basis to reduce idle time.  Provided unconditional positive regard in a safe, supportive environment.    Allowed patient to freely discuss issues without interruption or judgment. Provided safe, confidential environment to facilitate the development of positive therapeutic relationship and encourage open, honest communication. Assisted patient in identifying risk factors which would indicate the need for higher level of care including thoughts to harm self or others and/or self-harming behavior and encouraged patient to contact this office, call 911, or present to the nearest emergency room should any of these events occur. Discussed crisis intervention services and means to access.  Patient adamantly and convincingly denies current suicidal or homicidal ideation or perceptual disturbance.    Assessment    Patient continues to struggle with depression, anxiety, and post traumatic stress disorder which currently appears to be exacerbated by relationship strain.  In addition, the patient has a long history of suicidal ideation with at least one previous attempt.  The patient's symptoms continue to cause impairment in important areas of functioning.  As result, he would likely be at increased risk for decompensation without ongoing treatment.       Diagnoses and all orders for this visit:    Major depressive disorder, recurrent episode, moderate (CMS/HCC)    Generalized anxiety disorder    Post traumatic stress disorder (PTSD)               Mental Status Exam  Hygiene:  good  Dress:  casual  Attitude:  Cooperative  Motor Activity:   Appropriate  Speech:  Normal  Mood:  depressed  Affect:  calm and pleasant  Thought Processes:  Goal directed  Thought Content:  normal  Suicidal Thoughts:  denies  Homicidal Thoughts:  denies  Crisis Safety Plan: yes, to come to the emergency room.  Hallucinations:  denies    Patient's Support Network Includes:  significant other    Progress toward goal: Not at goal    Functional Status: Moderate impairment     Prognosis: Fair with Ongoing Treatment     Plan         Patient will continue in individual outpatient therapy sessions every 4 weeks at the [facility] and pharmacotherapy as scheduled with BJORN Short.  Patient will adhere to medication regimen as prescribed and report any side effects. Patient will contact this office, call 911 or present to the nearest emergency room should suicidal or homicidal ideations occur. Provide Cognitive Behavioral Therapy and Integrative Therapy to improve functioning, maintain stability, and avoid decompensation and the need for higher level of care.          Return in about 4 weeks (around 11/7/2018) for Next scheduled follow up.      This document signed by Bg Simmons LCSW, [facility] October 10, 2018 5:45 PM

## 2018-10-16 ENCOUNTER — OFFICE VISIT (OUTPATIENT)
Dept: UROLOGY | Facility: CLINIC | Age: 53
End: 2018-10-16

## 2018-10-16 ENCOUNTER — OFFICE VISIT (OUTPATIENT)
Dept: PSYCHIATRY | Facility: CLINIC | Age: 53
End: 2018-10-16

## 2018-10-16 VITALS — BODY MASS INDEX: 26.32 KG/M2 | WEIGHT: 188 LBS | HEIGHT: 71 IN

## 2018-10-16 DIAGNOSIS — F33.1 MAJOR DEPRESSIVE DISORDER, RECURRENT EPISODE, MODERATE (HCC): Primary | ICD-10-CM

## 2018-10-16 DIAGNOSIS — R79.89 LOW TESTOSTERONE: Primary | ICD-10-CM

## 2018-10-16 DIAGNOSIS — F41.1 GENERALIZED ANXIETY DISORDER: ICD-10-CM

## 2018-10-16 DIAGNOSIS — F43.10 POST TRAUMATIC STRESS DISORDER (PTSD): ICD-10-CM

## 2018-10-16 PROCEDURE — 90839 PSYTX CRISIS INITIAL 60 MIN: CPT | Performed by: SOCIAL WORKER

## 2018-10-16 PROCEDURE — 99213 OFFICE O/P EST LOW 20 MIN: CPT | Performed by: UROLOGY

## 2018-10-16 NOTE — PROGRESS NOTES
Chief Complaint:          Chief Complaint   Patient presents with   • Low Testosterone     6 week f/u       HPI:   53 y.o. male.  53-year-old white male who is currently using testosterone very successfully.  He still has anxiety and I explained to him that it does get better certain percentage of the time.  He has more energy he's having morning erections he's had sex one time which is new he has no other complaints or problems, to continue the treatment I see back in 6 months at which time we will check appropriate laboratory parameters    Past Medical History:        Past Medical History:   Diagnosis Date   • Anxiety    • Depression    • Hyperlipidemia    • Hypertension    • PTSD (post-traumatic stress disorder)    • Vitamin B12 deficiency          Current Meds:     Current Outpatient Prescriptions   Medication Sig Dispense Refill   • albuterol (PROVENTIL HFA;VENTOLIN HFA) 108 (90 BASE) MCG/ACT inhaler Inhale 1-2 puffs. Every 4-6 hours as needed and as directed.     • amLODIPine-benazepril (LOTREL 5-20) 5-20 MG per capsule Take 1 capsule by mouth Daily.     • atorvastatin (LIPITOR) 20 MG tablet Take 1 tablet by mouth Daily. 30 tablet 5   • busPIRone (BUSPAR) 10 MG tablet Take 1 tablet by mouth 2 (Two) Times a Day. 60 tablet 1   • cholecalciferol (VITAMIN D3) 1000 units tablet Take 1,000 Units by mouth Daily.     • doxepin (SINEquan) 75 MG capsule Take 1-2 by mouth at bedtime 60 capsule 1   • fenofibrate (TRICOR) 145 MG tablet Take 1 tablet by mouth Daily. 30 tablet 5   • losartan (COZAAR) 50 MG tablet Take 1.5 tablets by mouth daily. For:  Hypertension. 45 tablet 5   • omeprazole (priLOSEC) 20 MG capsule Take 1 capsule by mouth Daily. For:  Esophageal reflux. 90 capsule 1   • pantoprazole (PROTONIX) 40 MG EC tablet Take 40 mg by mouth Daily.     • PARoxetine (PAXIL) 20 MG tablet Take 1 tablet by mouth Daily. 30 tablet 1   • polyethylene glycol (MIRALAX) packet Take 17 g by mouth 2 (Two) Times a Day As Needed  (constipation). 100 each 2   • potassium chloride (K-DUR,KLOR-CON) 20 MEQ CR tablet Take 1 tablet by mouth 2 (Two) Times a Day. 4 tablet 0   • Syringe, Disposable, 3 ML misc Use 3 ml syringe with a 25 gauge  5/8 inch needle 24 each 6   • Testosterone Cypionate (DEPO-TESTOSTERONE) 200 MG/ML injection He is to use 1/2 cc every Monday and Thursday SQ 10 mL 2   • vitamin D (ERGOCALCIFEROL) 97574 units capsule capsule Take 1 capsule by mouth 1 (One) Time Per Week. 4 capsule 2     Current Facility-Administered Medications   Medication Dose Route Frequency Provider Last Rate Last Dose   • cyanocobalamin injection 1,000 mcg  1,000 mcg Intramuscular Q28 Days Claudette Randolph APRN   1,000 mcg at 09/27/17 1443        Allergies:      Allergies   Allergen Reactions   • Demerol Hcl [Meperidine]    • Levaquin [Levofloxacin]    • Morphine And Related    • Prozac [Fluoxetine Hcl]         Past Surgical History:     Past Surgical History:   Procedure Laterality Date   • ABDOMINAL SURGERY     • LEG SURGERY Right          Social History:     Social History     Social History   • Marital status: Single     Spouse name: N/A   • Number of children: N/A   • Years of education: N/A     Occupational History   • Not on file.     Social History Main Topics   • Smoking status: Former Smoker   • Smokeless tobacco: Never Used   • Alcohol use No      Comment: stopped drinking alcohol   • Drug use: No   • Sexual activity: Defer     Other Topics Concern   • Not on file     Social History Narrative   • No narrative on file       Family History:     Family History   Problem Relation Age of Onset   • Diabetes Mother    • Hypertension Mother    • Stroke Mother    • Heart disease Mother    • Rheum arthritis Father    • Heart disease Other    • Hypertension Other        Review of Systems:     Review of Systems   Constitutional: Negative.    HENT: Negative.    Eyes: Negative.    Respiratory: Negative.    Cardiovascular: Negative.    Gastrointestinal:  Negative.    Endocrine: Negative.    Musculoskeletal: Negative.    Allergic/Immunologic: Negative.    Neurological: Negative.    Hematological: Negative.    Psychiatric/Behavioral: Negative.        Physical Exam:     Physical Exam   Constitutional: He is oriented to person, place, and time. He appears well-developed and well-nourished.   HENT:   Head: Normocephalic and atraumatic.   Eyes: Pupils are equal, round, and reactive to light. Conjunctivae and EOM are normal.   Neck: Normal range of motion.   Cardiovascular: Normal rate, regular rhythm, normal heart sounds and intact distal pulses.    Pulmonary/Chest: Effort normal and breath sounds normal.   Abdominal: Soft. Bowel sounds are normal.   Musculoskeletal: Normal range of motion.   Neurological: He is alert and oriented to person, place, and time. He has normal reflexes.   Skin: Skin is warm and dry.   Psychiatric: He has a normal mood and affect. His behavior is normal. Judgment and thought content normal.   Nursing note and vitals reviewed.      I have reviewed the following portions of the patient's history: allergies, current medications, past family history, past medical history, past social history, past surgical history, problem list and ROS and confirm it's accurate.      Procedure:       Assessment/Plan:   -Low testosterone: patient is here for follow-up.  Since beginning the medication he's been very pleased.  He reports a dramatic improvement in his erections, ability to achieve and maintain an erection, improvement in libido, increase in frequency of morning erections, a noticeable weight loss consistent with the treatment.  No development of breast problems or abnormalities.  He's going to have appropriate safety laboratory parameters checked.   He understands that the new data implicates testosterone with the development of prostate cancer and this is all but been disproven and the medical literature as well as the risks of cardiovascular disease  which is actually also been disproven.  He understands that while he is a candidate for topical therapy if he is in contact with children this is not an option because it's been shown to accentuate genitalia development at an early age that this frequently irreversible.  He also understands this is a controlled substance and as such will not be prescribed without appropriate follow-up and appropriate laboratory investigation.  He understands effects on spermatogenesis including the fact that this is not always completely reversible and not always completely limited his ability to father a child.  He has demonstrated facility in the technique of both intramuscular and subcutaneous injection.  And has been taught sterility one drawing up the medication.     Patient's Body mass index is 26.22 kg/m². BMI is above normal parameters. Recommendations include: educational material.          This document has been electronically signed by ROBERT GUTIERREZ MD October 16, 2018 10:59 AM

## 2018-10-16 NOTE — PROGRESS NOTES
"    PROGRESS NOTE  Data:  Marilee Taylor came in 10/16/2018 for a crises work in session therapy session, with Vivian Merrill, [facility] because the patients primary therapist was unavailable from 1220 pm to 130 pm .  Pt. Reports depression has worsened and he and girlfriend of 8 years are in conflict.  He verbally gives permission that girlfriend Zoie may be in session and that it is 100% acceptable to discuss his treatment, diagnosis and plan while she is present.  He shares that they need help because despite loving her he cannot stand living with her right now.  She admits the tendency to mother him which is upsetting to him.  She also reports that she does not 100% trust him because he cheated on her several years ago and becomes afraid as PT pulls away because of his increased depression and anxiety.      Both report that after the patients Paxil was raised to 20 mg he was very angry and fought 3 people until girlfriend reduced the does back to 10 mg.  Encouraged both to always call the office when they have problems with the medication.     (Scales based on 0 - 10 with 10 being the worst)  Depression: 9 Anxiety: 6   Distress: 7 Sleep: 8   Tasks Completed on Time: 9 Mood: 8   Number of Panic Attacks: 0 Appetite: 6     Sleeping 16 hours, mood is depressed, anxious and irritable, easily upset and angered.  Has thoughts of not wanting to live but NO thoughts that he wants to harm himself or others.  He has been hearing his  father tell him that he would be better off dead but this has not changed in the last few weeks and the patient states \"I will come to the hospital if things get worse\".  He shares that these thoughts are not uncommon for him.      Clinical Maneuvering/Intervention:  Assisted patient in processing above session content; acknowledged and normalized patient’s thoughts, feelings, and concerns. Encouraged pt the importance of keeping all appointments and taking medications as " "prescribed and calling with any questions or concerns.  Assisted patient in understanding the importance of seeing the interdisciplinary team for continuity of care. Patient is able to acknowledge he will benefit from therapy with Bg Simmons and continuing to see Natasha MILAN. Attempted to de-escalate high emotion with the patient and girlfriend.  Educated about using \"I feel statements\" and gave them an example of how to use.  Also educated them about the concept of \"distancer and pursuer\" in relationships and how being aware of this can help reduce fears.  Created a safety plan for patient to call his girlfriend, present to the ER or call 911 is SI or HI occurs with a plan presents.   Encouraged his girlfriend Zoie to return to her own therapist for her own treatment.    Allowed patient to freely discuss issues without interruption or judgment. Provided safe, confidential environment to facilitate the development of positive therapeutic relationship and encourage open, honest communication. Assisted patient in identifying risk factors which would indicate the need for higher level of care including thoughts to harm self or others and/or self-harming behavior and encouraged patient to contact this office, call 911, or present to the nearest emergency room should any of these events occur. Discussed crisis intervention services and means to access.  Patient adamantly and convincingly denies current suicidal or homicidal ideation or perceptual disturbance.    Assessment     Patient presents for session on time, clean and casually dressed with depressed/anxious mood and congruent affect. No evidence of intoxication, withdrawal, or perceptual disturbance. Association’s intact, abstraction intact. Thought process is linear and logical. Speech is clear and coherent. Patient is oriented to person, place, and time. Attention and concentration fair. Insight and judgment fair. Patient reports no current suicidal or " homicidal ideation. Patient appears cooperative and agreeable to treatment and appears to begin to develop rapport. Patient does not appear to be malingering.          Mental Status Exam  Hygiene:  fair  Dress:  casual  Attitude:  Guarded  Motor Activity:  Restless  Speech:  Normal  Mood:  depressed  Affect:  depressed and agitated  Thought Processes:  Linear  Thought Content:  suicidal  Suicidal Thoughts:  admits to  Homicidal Thoughts:  denies  Crisis Safety Plan: yes, to come to the emergency room.  Hallucinations:  admits to    Patient's Support Network Includes:  significant other    Plan     Patient will see Natasha KRISHNAMURTHY at the [facility] available appt.  He and girlfriend Zoie will continue to live apart until after he has been assessed by the [facility].    Patient will have at least monthly outpatient psychotherapy sessions and pharmacotherapy as scheduled. Patient to be assessed by Natasha TELLEZ  for medication management.  Patient will adhere to medication regimen as prescribed and report any side effects. Patient will contact this office, call 911 or present to the nearest emergency room should suicidal or homicidal ideations occur. Provide Cognitive Behavioral Therapy and Solution Focused Therapy to improve functioning, maintain stability, and avoid decompensation and the need for higher level of care.

## 2018-10-19 ENCOUNTER — OFFICE VISIT (OUTPATIENT)
Dept: PSYCHIATRY | Facility: CLINIC | Age: 53
End: 2018-10-19

## 2018-10-19 VITALS
SYSTOLIC BLOOD PRESSURE: 163 MMHG | WEIGHT: 193 LBS | DIASTOLIC BLOOD PRESSURE: 79 MMHG | HEIGHT: 71 IN | HEART RATE: 114 BPM | BODY MASS INDEX: 27.02 KG/M2

## 2018-10-19 DIAGNOSIS — F33.1 MAJOR DEPRESSIVE DISORDER, RECURRENT EPISODE, MODERATE (HCC): Primary | ICD-10-CM

## 2018-10-19 DIAGNOSIS — F43.10 POST TRAUMATIC STRESS DISORDER (PTSD): ICD-10-CM

## 2018-10-19 DIAGNOSIS — F60.9 PERSONALITY DISORDER (HCC): ICD-10-CM

## 2018-10-19 DIAGNOSIS — F41.1 GENERALIZED ANXIETY DISORDER: ICD-10-CM

## 2018-10-19 PROCEDURE — 99214 OFFICE O/P EST MOD 30 MIN: CPT | Performed by: NURSE PRACTITIONER

## 2018-10-19 RX ORDER — DOXEPIN HYDROCHLORIDE 75 MG/1
CAPSULE ORAL
Qty: 60 CAPSULE | Refills: 1
Start: 2018-10-19 | End: 2018-11-02 | Stop reason: SDUPTHER

## 2018-10-19 RX ORDER — PAROXETINE 10 MG/1
15 TABLET, FILM COATED ORAL DAILY
Qty: 45 TABLET | Refills: 0
Start: 2018-10-19 | End: 2018-11-02 | Stop reason: SDUPTHER

## 2018-10-19 RX ORDER — BUSPIRONE HYDROCHLORIDE 10 MG/1
10 TABLET ORAL 2 TIMES DAILY
Qty: 60 TABLET | Refills: 1
Start: 2018-10-19 | End: 2018-11-02 | Stop reason: SDUPTHER

## 2018-10-19 NOTE — PROGRESS NOTES
"  Subjective   Marilee Taylor is a 53 y.o. male is here today for medication management follow-up at [facility], he presents to his appointment on time.    Chief Complaint:  Depression, anxiety     History of Present Illness  He stats that the Paxil 10 mg doesn't seem to be helpful and the increased dose causes him to have rage outburst.  He shares that he has been taking the 10 mg about 6 weeks.  He states now all he is wanting to do is sleep and he feels depressed.  He shares that he feels sad all day everyday, he states that his energy levels are \"nonexistent\", he has been isolated because he has so much anxiety it is hard for him to be around others.  He is not taking any rides on his bike, only when he \"had to\".  He reports that he is having a hard time falling asleep; he is going bed about 2-3 am, he has no idea why he is having a difficulty falling asleep.  He shares that he is having \"stupid\".  He shares that he has nausea, periods to where he can't breathe, shakes, and the shakes.  Rates depression and anxiety 10/10 with 10 being the worse.  He has having isssues with his anger \"off and on\" right now.  He gets agitated and irritated easily.   He states that he has been hearing his father tell him to \"come to him and all that \".  This has been going on for about 1 week.  He states that he can't point fingers, but significant others states that she is getting the brunt of his agitation.  Denies any SI/HI.  He states that he has seen a worsening of his symptoms with changing of the paxil.  He is also receiving Testerone with his symptoms occurring about the same time of his symptoms.        The following portions of the patient's history were reviewed and updated as appropriate: allergies, current medications, past family history, past medical history, past social history, past surgical history and problem list.    Review of Systems   Constitutional: Negative for appetite change, chills, diaphoresis, " "fatigue, fever and unexpected weight change.   HENT: Negative for hearing loss, sore throat, trouble swallowing and voice change.    Eyes: Negative for photophobia and visual disturbance.   Respiratory: Negative for cough, chest tightness and shortness of breath.    Cardiovascular: Negative for chest pain and palpitations.   Gastrointestinal: Negative for abdominal pain, constipation, nausea and vomiting.   Endocrine: Negative for cold intolerance and heat intolerance.   Genitourinary: Negative for dysuria and frequency.   Musculoskeletal: Negative for arthralgias, back pain, joint swelling and neck stiffness.   Skin: Negative for color change and wound.   Allergic/Immunologic: Negative for environmental allergies and immunocompromised state.   Neurological: Negative for dizziness, tremors, seizures, syncope, weakness, light-headedness and headaches.   Hematological: Negative for adenopathy. Does not bruise/bleed easily.       Objective   Physical Exam   Constitutional: He appears well-developed and well-nourished. No distress.   Neurological: He is alert. Coordination and gait normal.   Vitals reviewed.    Blood pressure 163/79, pulse 114, height 180.3 cm (71\"), weight 87.5 kg (193 lb).    Medication List:   Current Outpatient Prescriptions   Medication Sig Dispense Refill   • albuterol (PROVENTIL HFA;VENTOLIN HFA) 108 (90 BASE) MCG/ACT inhaler Inhale 1-2 puffs. Every 4-6 hours as needed and as directed.     • amLODIPine-benazepril (LOTREL 5-20) 5-20 MG per capsule Take 1 capsule by mouth Daily.     • atorvastatin (LIPITOR) 20 MG tablet Take 1 tablet by mouth Daily. 30 tablet 5   • Brexpiprazole (REXULTI) 0.5 MG tablet Take 0.5 mg by mouth Daily. 21 tablet 0   • busPIRone (BUSPAR) 10 MG tablet Take 1 tablet by mouth 2 (Two) Times a Day. 60 tablet 1   • cholecalciferol (VITAMIN D3) 1000 units tablet Take 1,000 Units by mouth Daily.     • doxepin (SINEquan) 75 MG capsule Take 1-2 by mouth at bedtime 60 capsule 1   • " fenofibrate (TRICOR) 145 MG tablet Take 1 tablet by mouth Daily. 30 tablet 5   • losartan (COZAAR) 50 MG tablet Take 1.5 tablets by mouth daily. For:  Hypertension. 45 tablet 5   • omeprazole (priLOSEC) 20 MG capsule Take 1 capsule by mouth Daily. For:  Esophageal reflux. 90 capsule 1   • pantoprazole (PROTONIX) 40 MG EC tablet Take 40 mg by mouth Daily.     • PARoxetine (PAXIL) 10 MG tablet Take 1.5 tablets by mouth Daily. 45 tablet 0   • polyethylene glycol (MIRALAX) packet Take 17 g by mouth 2 (Two) Times a Day As Needed (constipation). 100 each 2   • potassium chloride (K-DUR,KLOR-CON) 20 MEQ CR tablet Take 1 tablet by mouth 2 (Two) Times a Day. 4 tablet 0   • Syringe, Disposable, 3 ML misc Use 3 ml syringe with a 25 gauge  5/8 inch needle 24 each 6   • Testosterone Cypionate (DEPO-TESTOSTERONE) 200 MG/ML injection He is to use 1/2 cc every Monday and Thursday SQ 10 mL 2   • vitamin D (ERGOCALCIFEROL) 06921 units capsule capsule Take 1 capsule by mouth 1 (One) Time Per Week. 4 capsule 2     Current Facility-Administered Medications   Medication Dose Route Frequency Provider Last Rate Last Dose   • cyanocobalamin injection 1,000 mcg  1,000 mcg Intramuscular Q28 Days Claudette Randolph APRN   1,000 mcg at 09/27/17 1443       Mental Status Exam:   Hygiene:   good  Cooperation:  Cooperative  Eye Contact:  Fair  Psychomotor Behavior:  Appropriate  Affect:  Appropriate  Hopelessness: Denies  Speech:  Normal  Thought Process:  Goal directed and Linear  Thought Content:  Mood congurent  Suicidal:  None  Homicidal:  None  Hallucinations:  None  Delusion:  None  Memory:  Intact  Orientation:  Person, Place, Time and Situation  Reliability:  fair  Insight:  Fair  Judgement:  Fair  Impulse Control:  Fair  Physical/Medical Issues:  No     Assessment/Plan   Problems Addressed this Visit     None      Visit Diagnoses     Major depressive disorder, recurrent episode, moderate (CMS/HCC)    -  Primary    Relevant Medications     busPIRone (BUSPAR) 10 MG tablet    doxepin (SINEquan) 75 MG capsule    PARoxetine (PAXIL) 10 MG tablet    Brexpiprazole (REXULTI) 0.5 MG tablet    Generalized anxiety disorder        Relevant Medications    busPIRone (BUSPAR) 10 MG tablet    doxepin (SINEquan) 75 MG capsule    PARoxetine (PAXIL) 10 MG tablet    Brexpiprazole (REXULTI) 0.5 MG tablet    Post traumatic stress disorder (PTSD)        Relevant Medications    busPIRone (BUSPAR) 10 MG tablet    doxepin (SINEquan) 75 MG capsule    PARoxetine (PAXIL) 10 MG tablet    Brexpiprazole (REXULTI) 0.5 MG tablet    Personality disorder (CMS/HCC)        Relevant Medications    busPIRone (BUSPAR) 10 MG tablet    doxepin (SINEquan) 75 MG capsule    PARoxetine (PAXIL) 10 MG tablet    Brexpiprazole (REXULTI) 0.5 MG tablet        Discussed medication options. Continue buspar for anxiety, doxepin for sleep, increase paxil to 15mg daily, add rexulti 0.5mg for mood/  Reviewed the risks, benefits, and side effects of the medications; patient acknowledged and verbally consented.  Patient is agreeable to call the [facility].  Patient is aware to call 911 or go to the nearest ER should begin having SI/HI. Recommended DNA testing for medications.     Prognosis: Guarded dependent on medication, follow up appointment and treatment plan compliance     Functionality:Poor.  Symptoms have returned making it difficult to maintain interpersonal contact with his girlfriend.      Return in 4 weeks

## 2018-10-23 ENCOUNTER — OFFICE VISIT (OUTPATIENT)
Dept: FAMILY MEDICINE CLINIC | Facility: CLINIC | Age: 53
End: 2018-10-23

## 2018-10-23 VITALS
DIASTOLIC BLOOD PRESSURE: 84 MMHG | SYSTOLIC BLOOD PRESSURE: 131 MMHG | OXYGEN SATURATION: 98 % | WEIGHT: 197 LBS | BODY MASS INDEX: 27.58 KG/M2 | HEIGHT: 71 IN | HEART RATE: 103 BPM

## 2018-10-23 DIAGNOSIS — E87.6 HYPOKALEMIA: ICD-10-CM

## 2018-10-23 DIAGNOSIS — F32.A DEPRESSION, UNSPECIFIED DEPRESSION TYPE: Primary | ICD-10-CM

## 2018-10-23 LAB — POTASSIUM BLD-SCNC: 3.4 MMOL/L (ref 3.5–5.3)

## 2018-10-23 PROCEDURE — 84132 ASSAY OF SERUM POTASSIUM: CPT | Performed by: NURSE PRACTITIONER

## 2018-10-23 PROCEDURE — 36415 COLL VENOUS BLD VENIPUNCTURE: CPT | Performed by: NURSE PRACTITIONER

## 2018-10-23 PROCEDURE — 99213 OFFICE O/P EST LOW 20 MIN: CPT | Performed by: NURSE PRACTITIONER

## 2018-10-23 NOTE — PROGRESS NOTES
Subjective   Marilee Taylor is a 53 y.o. male.     Chief Complaint: Follow-up and Hypertension    Depression   Visit Type: follow-up (pt continues to follow with psychiatry; he states he has been doing much better since starting Rexulti)  Patient presents with the following symptoms: depressed mood and irritability.  Patient is not experiencing: suicidal ideas and thoughts of death.  Frequency of symptoms: most days   Severity: moderate   Sleep quality: fair  Nighttime awakenings: occasional  Compliance with medications:  %        Hypokalemia.  Pt finished K+ supplement.  Pt is needing recheck on K+ level today.  He denies any chest pain, palpitations, n/v/d.     Family History   Problem Relation Age of Onset   • Diabetes Mother    • Hypertension Mother    • Stroke Mother    • Heart disease Mother    • Rheum arthritis Father    • Heart disease Other    • Hypertension Other        Social History     Social History   • Marital status: Single     Spouse name: N/A   • Number of children: N/A   • Years of education: N/A     Occupational History   • Not on file.     Social History Main Topics   • Smoking status: Former Smoker   • Smokeless tobacco: Never Used   • Alcohol use No      Comment: stopped drinking alcohol   • Drug use: No   • Sexual activity: Defer     Other Topics Concern   • Not on file     Social History Narrative   • No narrative on file       Past Medical History:   Diagnosis Date   • Anxiety    • Depression    • Hyperlipidemia    • Hypertension    • PTSD (post-traumatic stress disorder)    • Vitamin B12 deficiency        Review of Systems   Constitutional: Positive for irritability.   HENT: Negative.    Respiratory: Negative.    Cardiovascular: Negative.    Gastrointestinal: Negative.    Musculoskeletal: Negative.    Skin: Negative.    Neurological: Negative.    Psychiatric/Behavioral: Negative for suicidal ideas.       Objective   Physical Exam   Constitutional: He is oriented to person, place, and  "time. He appears well-developed and well-nourished.   Neck: Normal range of motion. Neck supple.   Cardiovascular: Normal rate, regular rhythm and normal heart sounds.    Pulmonary/Chest: Effort normal and breath sounds normal.   Neurological: He is alert and oriented to person, place, and time.   Skin: Skin is warm and dry.   Psychiatric: He has a normal mood and affect. His behavior is normal. Judgment and thought content normal.   Nursing note and vitals reviewed.      Procedures    Vitals: Blood pressure 131/84, pulse 103, height 180.3 cm (71\"), weight 89.4 kg (197 lb), SpO2 98 %.    Allergies:   Allergies   Allergen Reactions   • Demerol Hcl [Meperidine]    • Levaquin [Levofloxacin]    • Morphine And Related    • Prozac [Fluoxetine Hcl]         During this visit the following were done:  Labs Reviewed []    Labs Ordered []    Radiology Reports Reviewed []    Radiology Ordered []    PCP Records Reviewed []    Referring Provider Records Reviewed []    ER Records Reviewed []    Hospital Records Reviewed []    History Obtained From Family []    Radiology Images Reviewed []    Other Reviewed []    Records Requested []      Assessment/Plan   Marilee was seen today for follow-up and hypertension.    Diagnoses and all orders for this visit:    Depression, unspecified depression type    Hypokalemia  -     Potassium               "

## 2018-10-24 ENCOUNTER — TELEPHONE (OUTPATIENT)
Dept: FAMILY MEDICINE CLINIC | Facility: CLINIC | Age: 53
End: 2018-10-24

## 2018-10-24 RX ORDER — POTASSIUM CHLORIDE 20 MEQ/1
20 TABLET, EXTENDED RELEASE ORAL 2 TIMES DAILY
Qty: 2 TABLET | Refills: 0 | Status: SHIPPED | OUTPATIENT
Start: 2018-10-24 | End: 2019-01-11

## 2018-10-24 NOTE — TELEPHONE ENCOUNTER
----- Message from BJORN Vincent sent at 10/24/2018  9:03 AM EDT -----  K+ is better but just a little low.  I have sent another script for K+ supplement x2 doses to the pharmacy for him.   Please let him know.         Left a message to return call.      Patient came into the clinic & was notified of labs & verbalized understanding.

## 2018-10-24 NOTE — PROGRESS NOTES
K+ is better but just a little low.  I have sent another script for K+ supplement x2 doses to the pharmacy for him.   Please let him know.

## 2018-11-02 ENCOUNTER — OFFICE VISIT (OUTPATIENT)
Dept: PSYCHIATRY | Facility: CLINIC | Age: 53
End: 2018-11-02

## 2018-11-02 VITALS
HEART RATE: 91 BPM | DIASTOLIC BLOOD PRESSURE: 86 MMHG | BODY MASS INDEX: 28 KG/M2 | SYSTOLIC BLOOD PRESSURE: 142 MMHG | HEIGHT: 71 IN | WEIGHT: 200 LBS

## 2018-11-02 DIAGNOSIS — F41.1 GENERALIZED ANXIETY DISORDER: ICD-10-CM

## 2018-11-02 DIAGNOSIS — F60.9 PERSONALITY DISORDER (HCC): ICD-10-CM

## 2018-11-02 DIAGNOSIS — F33.1 MAJOR DEPRESSIVE DISORDER, RECURRENT EPISODE, MODERATE (HCC): Primary | ICD-10-CM

## 2018-11-02 DIAGNOSIS — F43.10 POST TRAUMATIC STRESS DISORDER (PTSD): ICD-10-CM

## 2018-11-02 PROCEDURE — 99214 OFFICE O/P EST MOD 30 MIN: CPT | Performed by: NURSE PRACTITIONER

## 2018-11-02 RX ORDER — BUSPIRONE HYDROCHLORIDE 10 MG/1
10 TABLET ORAL 2 TIMES DAILY
Qty: 60 TABLET | Refills: 1 | Status: SHIPPED | OUTPATIENT
Start: 2018-11-02 | End: 2018-11-27 | Stop reason: ALTCHOICE

## 2018-11-02 RX ORDER — PAROXETINE 10 MG/1
15 TABLET, FILM COATED ORAL DAILY
Qty: 45 TABLET | Refills: 0 | Status: SHIPPED | OUTPATIENT
Start: 2018-11-02 | End: 2018-11-27 | Stop reason: ALTCHOICE

## 2018-11-02 RX ORDER — DOXEPIN HYDROCHLORIDE 75 MG/1
CAPSULE ORAL
Qty: 60 CAPSULE | Refills: 1 | Status: SHIPPED | OUTPATIENT
Start: 2018-11-02 | End: 2018-11-27 | Stop reason: ALTCHOICE

## 2018-11-02 NOTE — PROGRESS NOTES
"  Subjective   Marilee Taylor is a 53 y.o. male is here today for medication management follow-up at [facility], he presents to his appointment on time.    Chief Complaint:  Depression, anxiety     History of Present Illness  He states that he feels like the medications has been effective, he feels like the decrease in the paxil had been helpful.  He could tell a difference with the rexulti in about 3 days.  He denies any SE or problems.  He shares that he feels that his depression 5/10 and anxiety 0/10 with 10 being the worse.  He states that he continues to feel \"blah\" but is feeling better each day.  He states that he doesn't know if he would be able to get out of it with the rexulti.  He shares that he has been sleeping \"pretty good\"; he is averaging about 9 hours per night with no NM.  However, he states that when he is awake he sees \"ghost\", he shares that he saw a small child on his motorcycle.  He shares that he has been living away from home for the last 3 weeks in an apartment; he doesn't believe in the paranormal.  He states that he is considering staying in the apartments for awhile, his relationship with his significant other has been conflictual, he feels like his anxiety has been lessened since being away from her.  Energy has improved and his appetite has been good; noted to have gained a little weight.  He denies any recent illness.  Denies any AV hallucinations, denies any SI/HI.        The following portions of the patient's history were reviewed and updated as appropriate: allergies, current medications, past family history, past medical history, past social history, past surgical history and problem list.    Review of Systems   Constitutional: Negative for appetite change, chills, diaphoresis, fatigue, fever and unexpected weight change.   HENT: Negative for hearing loss, sore throat, trouble swallowing and voice change.    Eyes: Negative for photophobia and visual disturbance. " "  Respiratory: Negative for cough, chest tightness and shortness of breath.    Cardiovascular: Negative for chest pain and palpitations.   Gastrointestinal: Negative for abdominal pain, constipation, nausea and vomiting.   Endocrine: Negative for cold intolerance and heat intolerance.   Genitourinary: Negative for dysuria and frequency.   Musculoskeletal: Negative for arthralgias, back pain, joint swelling and neck stiffness.   Skin: Negative for color change and wound.   Allergic/Immunologic: Negative for environmental allergies and immunocompromised state.   Neurological: Negative for dizziness, tremors, seizures, syncope, weakness, light-headedness and headaches.   Hematological: Negative for adenopathy. Does not bruise/bleed easily.       Objective   Physical Exam   Constitutional: He appears well-developed and well-nourished. No distress.   Neurological: He is alert. Coordination and gait normal.   Vitals reviewed.    Blood pressure 142/86, pulse 91, height 180.3 cm (71\"), weight 90.7 kg (200 lb).    Medication List:   Current Outpatient Prescriptions   Medication Sig Dispense Refill   • albuterol (PROVENTIL HFA;VENTOLIN HFA) 108 (90 BASE) MCG/ACT inhaler Inhale 1-2 puffs. Every 4-6 hours as needed and as directed.     • amLODIPine-benazepril (LOTREL 5-20) 5-20 MG per capsule Take 1 capsule by mouth Daily.     • atorvastatin (LIPITOR) 20 MG tablet Take 1 tablet by mouth Daily. 30 tablet 5   • Brexpiprazole (REXULTI) 0.5 MG tablet Take 0.5 mg by mouth Daily. 42 tablet 0   • busPIRone (BUSPAR) 10 MG tablet Take 1 tablet by mouth 2 (Two) Times a Day. 60 tablet 1   • cholecalciferol (VITAMIN D3) 1000 units tablet Take 1,000 Units by mouth Daily.     • doxepin (SINEquan) 75 MG capsule Take 1-2 by mouth at bedtime 60 capsule 1   • fenofibrate (TRICOR) 145 MG tablet Take 1 tablet by mouth Daily. 30 tablet 5   • losartan (COZAAR) 50 MG tablet Take 1.5 tablets by mouth daily. For:  Hypertension. 45 tablet 5   • " omeprazole (priLOSEC) 20 MG capsule Take 1 capsule by mouth Daily. For:  Esophageal reflux. 90 capsule 1   • pantoprazole (PROTONIX) 40 MG EC tablet Take 40 mg by mouth Daily.     • PARoxetine (PAXIL) 10 MG tablet Take 1.5 tablets by mouth Daily. 45 tablet 0   • polyethylene glycol (MIRALAX) packet Take 17 g by mouth 2 (Two) Times a Day As Needed (constipation). 100 each 2   • potassium chloride (K-DUR,KLOR-CON) 20 MEQ CR tablet Take 1 tablet by mouth 2 (Two) Times a Day. 2 tablet 0   • Syringe, Disposable, 3 ML misc Use 3 ml syringe with a 25 gauge  5/8 inch needle 24 each 6   • Testosterone Cypionate (DEPO-TESTOSTERONE) 200 MG/ML injection He is to use 1/2 cc every Monday and Thursday SQ 10 mL 2   • vitamin D (ERGOCALCIFEROL) 53578 units capsule capsule Take 1 capsule by mouth 1 (One) Time Per Week. 4 capsule 2     No current facility-administered medications for this visit.        Mental Status Exam:   Hygiene:   good  Cooperation:  Cooperative  Eye Contact:  Fair  Psychomotor Behavior:  Appropriate  Affect:  Appropriate  Hopelessness: Denies  Speech:  Normal  Thought Process:  Goal directed and Linear  Thought Content:  Mood congurent  Suicidal:  None  Homicidal:  None  Hallucinations:  None  Delusion:  None  Memory:  Intact  Orientation:  Person, Place, Time and Situation  Reliability:  fair  Insight:  Fair  Judgement:  Fair  Impulse Control:  Fair  Physical/Medical Issues:  No     Assessment/Plan   Problems Addressed this Visit     None      Visit Diagnoses     Major depressive disorder, recurrent episode, moderate (CMS/HCC)    -  Primary    Relevant Medications    busPIRone (BUSPAR) 10 MG tablet    PARoxetine (PAXIL) 10 MG tablet    doxepin (SINEquan) 75 MG capsule    Brexpiprazole (REXULTI) 0.5 MG tablet    Generalized anxiety disorder        Relevant Medications    busPIRone (BUSPAR) 10 MG tablet    PARoxetine (PAXIL) 10 MG tablet    doxepin (SINEquan) 75 MG capsule    Brexpiprazole (REXULTI) 0.5 MG tablet     Post traumatic stress disorder (PTSD)        Relevant Medications    busPIRone (BUSPAR) 10 MG tablet    PARoxetine (PAXIL) 10 MG tablet    doxepin (SINEquan) 75 MG capsule    Brexpiprazole (REXULTI) 0.5 MG tablet    Personality disorder (CMS/HCC)        Relevant Medications    busPIRone (BUSPAR) 10 MG tablet    PARoxetine (PAXIL) 10 MG tablet    doxepin (SINEquan) 75 MG capsule    Brexpiprazole (REXULTI) 0.5 MG tablet        Discussed medication options. Continue buspar for anxiety, doxepin for sleep, continue paxil to 15mg daily, and continue rexulti 0.5mg for mood/  Reviewed the risks, benefits, and side effects of the medications; patient acknowledged and verbally consented.  Patient is agreeable to call the [facility].  Patient is aware to call 911 or go to the nearest ER should begin having SI/HI. Recommended DNA testing for medications.     Prognosis: Guarded dependent on medication, follow up appointment and treatment plan compliance     Functionality:Poor.  Symptoms have returned making it difficult to maintain interpersonal contact with his girlfriend.      Return in 6 weeks

## 2018-11-21 ENCOUNTER — OFFICE VISIT (OUTPATIENT)
Dept: FAMILY MEDICINE CLINIC | Facility: CLINIC | Age: 53
End: 2018-11-21

## 2018-11-21 VITALS
DIASTOLIC BLOOD PRESSURE: 92 MMHG | WEIGHT: 195 LBS | BODY MASS INDEX: 27.3 KG/M2 | HEIGHT: 71 IN | HEART RATE: 94 BPM | OXYGEN SATURATION: 98 % | TEMPERATURE: 98.3 F | SYSTOLIC BLOOD PRESSURE: 150 MMHG

## 2018-11-21 DIAGNOSIS — B37.0 ORAL CANDIDIASIS: Primary | ICD-10-CM

## 2018-11-21 PROCEDURE — 99213 OFFICE O/P EST LOW 20 MIN: CPT | Performed by: NURSE PRACTITIONER

## 2018-11-21 RX ORDER — LITHIUM CARBONATE 150 MG/1
CAPSULE ORAL
Refills: 0 | COMMUNITY
Start: 2018-11-20 | End: 2018-11-27 | Stop reason: ALTCHOICE

## 2018-11-21 RX ORDER — DIPHENHYDRAMINE, LIDOCAINE, NYSTATIN
5 KIT ORAL 4 TIMES DAILY
Qty: 120 ML | Refills: 0 | Status: SHIPPED | OUTPATIENT
Start: 2018-11-21 | End: 2019-01-11

## 2018-11-21 NOTE — PROGRESS NOTES
Subjective   Marilee Taylor is a 53 y.o. male.     Chief Complaint: Blister (mouth) and Difficulty Swallowing    Oral Pain    This is a new problem. The current episode started in the past 7 days. The problem occurs constantly. The problem has been unchanged. The pain is moderate. Associated symptoms include difficulty swallowing. He has tried nothing for the symptoms.   No shortness of breath.  One episode of swallowing issue this morning due to soreness in throat.  Drank fluids and done much better.  No further swallowing problems.    Pt has been instructed to go to ER immediately if s/s worsen.  He states understanding and agrees.     Family History   Problem Relation Age of Onset   • Diabetes Mother    • Hypertension Mother    • Stroke Mother    • Heart disease Mother    • Rheum arthritis Father    • Heart disease Other    • Hypertension Other        Social History     Socioeconomic History   • Marital status: Single     Spouse name: Not on file   • Number of children: Not on file   • Years of education: Not on file   • Highest education level: Not on file   Social Needs   • Financial resource strain: Not on file   • Food insecurity - worry: Not on file   • Food insecurity - inability: Not on file   • Transportation needs - medical: Not on file   • Transportation needs - non-medical: Not on file   Occupational History   • Not on file   Tobacco Use   • Smoking status: Former Smoker   • Smokeless tobacco: Never Used   Substance and Sexual Activity   • Alcohol use: No     Comment: stopped drinking alcohol   • Drug use: No   • Sexual activity: Defer   Other Topics Concern   • Not on file   Social History Narrative   • Not on file       Past Medical History:   Diagnosis Date   • Anxiety    • Depression    • Hyperlipidemia    • Hypertension    • PTSD (post-traumatic stress disorder)    • Vitamin B12 deficiency        Review of Systems   Constitutional: Negative.    HENT: Positive for mouth sores and sore throat.  "Negative for drooling, ear pain and postnasal drip.    Respiratory: Negative.    Cardiovascular: Negative.    Gastrointestinal: Negative.    Musculoskeletal: Negative.    Skin: Negative.    Neurological: Negative.    Psychiatric/Behavioral: Negative.        Objective   Physical Exam   Constitutional: He is oriented to person, place, and time. He appears well-developed and well-nourished.   HENT:   Right Ear: External ear normal.   Left Ear: External ear normal.   Tongue erythematous with white patches; white patches noted to right side of throat   Eyes: Conjunctivae are normal. Pupils are equal, round, and reactive to light.   Neck: Normal range of motion. Neck supple.   Cardiovascular: Normal rate, regular rhythm and normal heart sounds.   Pulmonary/Chest: Effort normal and breath sounds normal.   Lymphadenopathy:     He has no cervical adenopathy.   Neurological: He is alert and oriented to person, place, and time.   Skin: Skin is warm and dry.   Psychiatric: He has a normal mood and affect. His behavior is normal. Judgment and thought content normal.   Nursing note and vitals reviewed.      Procedures    Vitals: Blood pressure 150/92, pulse 94, temperature 98.3 °F (36.8 °C), temperature source Oral, height 180.3 cm (71\"), weight 88.5 kg (195 lb), SpO2 98 %.    Allergies:   Allergies   Allergen Reactions   • Demerol Hcl [Meperidine]    • Levaquin [Levofloxacin]    • Morphine And Related    • Prozac [Fluoxetine Hcl]         During this visit the following were done:  Labs Reviewed []    Labs Ordered []    Radiology Reports Reviewed []    Radiology Ordered []    PCP Records Reviewed []    Referring Provider Records Reviewed []    ER Records Reviewed []    Hospital Records Reviewed []    History Obtained From Family []    Radiology Images Reviewed []    Other Reviewed []    Records Requested []      Assessment/Plan   Marilee was seen today for blister and difficulty swallowing.    Diagnoses and all orders for this " visit:    Oral candidiasis  -     nystatin susp + lidocaine viscous (MAGIC MOUTHWASH) oral suspension; Swish and swallow 5 mL 4 (Four) Times a Day.

## 2018-11-27 ENCOUNTER — OFFICE VISIT (OUTPATIENT)
Dept: PSYCHIATRY | Facility: CLINIC | Age: 53
End: 2018-11-27

## 2018-11-27 VITALS
BODY MASS INDEX: 26.91 KG/M2 | WEIGHT: 192.2 LBS | SYSTOLIC BLOOD PRESSURE: 157 MMHG | DIASTOLIC BLOOD PRESSURE: 101 MMHG | HEIGHT: 71 IN

## 2018-11-27 DIAGNOSIS — F41.1 GENERALIZED ANXIETY DISORDER: ICD-10-CM

## 2018-11-27 DIAGNOSIS — F43.10 POST TRAUMATIC STRESS DISORDER (PTSD): ICD-10-CM

## 2018-11-27 DIAGNOSIS — F33.1 MAJOR DEPRESSIVE DISORDER, RECURRENT EPISODE, MODERATE (HCC): Primary | ICD-10-CM

## 2018-11-27 DIAGNOSIS — F60.9 PERSONALITY DISORDER (HCC): ICD-10-CM

## 2018-11-27 PROCEDURE — 99214 OFFICE O/P EST MOD 30 MIN: CPT | Performed by: NURSE PRACTITIONER

## 2018-11-27 RX ORDER — DESVENLAFAXINE SUCCINATE 50 MG/1
50 TABLET, EXTENDED RELEASE ORAL DAILY
Qty: 30 TABLET | Refills: 0 | Status: SHIPPED | OUTPATIENT
Start: 2018-11-27 | End: 2019-01-11

## 2018-11-27 RX ORDER — MIRTAZAPINE 15 MG/1
15 TABLET, FILM COATED ORAL NIGHTLY
Qty: 30 TABLET | Refills: 0 | Status: SHIPPED | OUTPATIENT
Start: 2018-11-27 | End: 2019-05-09 | Stop reason: SDUPTHER

## 2018-11-27 NOTE — PROGRESS NOTES
Subjective   Marilee Taylor is a 53 y.o. male is here today for medication management follow-up at [facility], he presents to his appointment on time.    Chief Complaint:  Depression, anxiety     History of Present Illness  He states that he was admitted to [facility] because of increased depression and suicidal thoughts. He states that while there he was taken off the paxil and started on effexor, lithium and trazodone.  He states that when he left there he was mellowed out.  He states that the 2nd day of being at home he started having problems with swallowing because of a yeast that had developed at the back of his throat.  He went to see his PCP who treated it and then he stopped all of the medications.  He has not been on any medications for about 1 week other than his medical medications.  He states that he stays dizzy, irritable, weird NM of people coming after him.  He feels like he is stuck in a NM.  He states that they wanted him to return to the hospital last night because of the way he was feeling.  Reviewed his medications; concerns with the lithium being started along with the patient's blood pressure medication, as well as the paxil being discontinued abruptly as his symptoms appear to be associated with withdraw symptoms.  Since he is not on any current medications, will attempt to start him on pristiq for his depression and anxiety as well as mirtazepine for his sleep and mood.  Denies any AV hallucinations, denies any SI/HI.  He is aware to come to the ER should he begin having any worsening of symptoms.        The following portions of the patient's history were reviewed and updated as appropriate: allergies, current medications, past family history, past medical history, past social history, past surgical history and problem list.    Review of Systems   Constitutional: Negative for appetite change, chills, diaphoresis, fatigue, fever and unexpected weight change.   HENT:  "Negative for hearing loss, sore throat, trouble swallowing and voice change.    Eyes: Negative for photophobia and visual disturbance.   Respiratory: Negative for cough, chest tightness and shortness of breath.    Cardiovascular: Negative for chest pain and palpitations.   Gastrointestinal: Negative for abdominal pain, constipation, nausea and vomiting.   Endocrine: Negative for cold intolerance and heat intolerance.   Genitourinary: Negative for dysuria and frequency.   Musculoskeletal: Negative for arthralgias, back pain, joint swelling and neck stiffness.   Skin: Negative for color change and wound.   Allergic/Immunologic: Negative for environmental allergies and immunocompromised state.   Neurological: Negative for dizziness, tremors, seizures, syncope, weakness, light-headedness and headaches.   Hematological: Negative for adenopathy. Does not bruise/bleed easily.       Objective   Physical Exam   Constitutional: He appears well-developed and well-nourished. No distress.   Neurological: He is alert. Coordination and gait normal.   Vitals reviewed.    Blood pressure (!) 157/101, height 180.3 cm (71\"), weight 87.2 kg (192 lb 3.2 oz).    Medication List:   Current Outpatient Medications   Medication Sig Dispense Refill   • albuterol (PROVENTIL HFA;VENTOLIN HFA) 108 (90 BASE) MCG/ACT inhaler Inhale 1-2 puffs. Every 4-6 hours as needed and as directed.     • amLODIPine-benazepril (LOTREL 5-20) 5-20 MG per capsule Take 1 capsule by mouth Daily.     • atorvastatin (LIPITOR) 20 MG tablet Take 1 tablet by mouth Daily. 30 tablet 5   • cholecalciferol (VITAMIN D3) 1000 units tablet Take 1,000 Units by mouth Daily.     • fenofibrate (TRICOR) 145 MG tablet Take 1 tablet by mouth Daily. 30 tablet 5   • losartan (COZAAR) 50 MG tablet Take 1.5 tablets by mouth daily. For:  Hypertension. 45 tablet 5   • nystatin susp + lidocaine viscous (MAGIC MOUTHWASH) oral suspension Swish and swallow 5 mL 4 (Four) Times a Day. 120 mL 0   • " omeprazole (priLOSEC) 20 MG capsule Take 1 capsule by mouth Daily. For:  Esophageal reflux. 90 capsule 1   • vitamin D (ERGOCALCIFEROL) 34586 units capsule capsule Take 1 capsule by mouth 1 (One) Time Per Week. 4 capsule 2   • desvenlafaxine (PRISTIQ) 50 MG 24 hr tablet Take 1 tablet by mouth Daily. 30 tablet 0   • mirtazapine (REMERON) 15 MG tablet Take 1 tablet by mouth Every Night. 30 tablet 0   • polyethylene glycol (MIRALAX) packet Take 17 g by mouth 2 (Two) Times a Day As Needed (constipation). 100 each 2   • potassium chloride (K-DUR,KLOR-CON) 20 MEQ CR tablet Take 1 tablet by mouth 2 (Two) Times a Day. 2 tablet 0   • Syringe, Disposable, 3 ML misc Use 3 ml syringe with a 25 gauge  5/8 inch needle 24 each 6   • Testosterone Cypionate (DEPO-TESTOSTERONE) 200 MG/ML injection He is to use 1/2 cc every Monday and Thursday SQ 10 mL 2     No current facility-administered medications for this visit.        Mental Status Exam:   Hygiene:   good  Cooperation:  Cooperative  Eye Contact:  Fair  Psychomotor Behavior:  Appropriate  Affect:  Appropriate  Hopelessness: Denies  Speech:  Normal  Thought Process:  Disorganized  Thought Content:  Mood congurent  Suicidal:  None  Homicidal:  None  Hallucinations:  None  Delusion:  None  Memory:  Intact  Orientation:  Person, Place, Time and Situation  Reliability:  fair  Insight:  Fair  Judgement:  Fair  Impulse Control:  Fair  Physical/Medical Issues:  No     Assessment/Plan   Problems Addressed this Visit     None      Visit Diagnoses     Major depressive disorder, recurrent episode, moderate (CMS/HCC)    -  Primary    Relevant Medications    desvenlafaxine (PRISTIQ) 50 MG 24 hr tablet    mirtazapine (REMERON) 15 MG tablet    Generalized anxiety disorder        Relevant Medications    desvenlafaxine (PRISTIQ) 50 MG 24 hr tablet    mirtazapine (REMERON) 15 MG tablet    Post traumatic stress disorder (PTSD)        Relevant Medications    desvenlafaxine (PRISTIQ) 50 MG 24 hr  tablet    mirtazapine (REMERON) 15 MG tablet    Personality disorder (CMS/HCC)        Relevant Medications    desvenlafaxine (PRISTIQ) 50 MG 24 hr tablet    mirtazapine (REMERON) 15 MG tablet        Discussed medication options. Recommended patient return home and take paxil 10mg one dose to help with withdraw symptoms, also will begin Pristiq for depression and anxiety and remeron for depression and sleep.  Reviewed the risks, benefits, and side effects of the medications; patient acknowledged and verbally consented.  Patient is agreeable to call the [facility].  Patient is aware to call 911 or go to the nearest ER should begin having SI/HI. Recommended DNA testing for medications. Will get records from [facility].     Prognosis: Guarded dependent on medication, follow up appointment and treatment plan compliance     Functionality:Poor.  Symptoms have returned making it difficult to maintain interpersonal contact with his girlfriend.      Return in 2 weeks

## 2018-12-29 ENCOUNTER — HOSPITAL ENCOUNTER (EMERGENCY)
Facility: HOSPITAL | Age: 53
Discharge: HOME | End: 2018-12-29
Attending: EMERGENCY MEDICINE | Admitting: EMERGENCY MEDICINE

## 2018-12-29 ENCOUNTER — APPOINTMENT (OUTPATIENT)
Dept: GENERAL RADIOLOGY | Facility: HOSPITAL | Age: 53
End: 2018-12-29

## 2018-12-29 VITALS
DIASTOLIC BLOOD PRESSURE: 89 MMHG | TEMPERATURE: 99 F | OXYGEN SATURATION: 96 % | SYSTOLIC BLOOD PRESSURE: 149 MMHG | HEART RATE: 96 BPM | HEIGHT: 71 IN | RESPIRATION RATE: 18 BRPM | WEIGHT: 195 LBS | BODY MASS INDEX: 27.3 KG/M2

## 2018-12-29 DIAGNOSIS — J18.9 PNEUMONIA OF BOTH LOWER LOBES DUE TO INFECTIOUS ORGANISM: ICD-10-CM

## 2018-12-29 DIAGNOSIS — J10.1 INFLUENZA A: Primary | ICD-10-CM

## 2018-12-29 LAB
ALBUMIN SERPL-MCNC: 4.5 G/DL (ref 3.5–5)
ALBUMIN/GLOB SERPL: 1.7 G/DL (ref 1.5–2.5)
ALP SERPL-CCNC: 72 U/L (ref 40–129)
ALT SERPL W P-5'-P-CCNC: 52 U/L (ref 10–44)
AMORPH URATE CRY URNS QL MICRO: ABNORMAL /HPF
AMYLASE SERPL-CCNC: 53 U/L (ref 28–100)
ANION GAP SERPL CALCULATED.3IONS-SCNC: 8.7 MMOL/L (ref 3.6–11.2)
AST SERPL-CCNC: 43 U/L (ref 10–34)
BACTERIA UR QL AUTO: ABNORMAL /HPF
BASOPHILS # BLD AUTO: 0.01 10*3/MM3 (ref 0–0.3)
BASOPHILS NFR BLD AUTO: 0.2 % (ref 0–2)
BILIRUB SERPL-MCNC: 0.6 MG/DL (ref 0.2–1.8)
BILIRUB UR QL STRIP: NEGATIVE
BUN BLD-MCNC: 16 MG/DL (ref 7–21)
BUN/CREAT SERPL: 12.9 (ref 7–25)
CALCIUM SPEC-SCNC: 8.7 MG/DL (ref 7.7–10)
CHLORIDE SERPL-SCNC: 106 MMOL/L (ref 99–112)
CLARITY UR: CLEAR
CO2 SERPL-SCNC: 25.3 MMOL/L (ref 24.3–31.9)
COLOR UR: ABNORMAL
CREAT BLD-MCNC: 1.24 MG/DL (ref 0.43–1.29)
CRP SERPL-MCNC: 9.32 MG/DL (ref 0–0.99)
DEPRECATED RDW RBC AUTO: 39.8 FL (ref 37–54)
EOSINOPHIL # BLD AUTO: 0.1 10*3/MM3 (ref 0–0.7)
EOSINOPHIL NFR BLD AUTO: 1.9 % (ref 0–5)
ERYTHROCYTE [DISTWIDTH] IN BLOOD BY AUTOMATED COUNT: 13.2 % (ref 11.5–14.5)
FLUAV AG NPH QL: POSITIVE
FLUBV AG NPH QL IA: NEGATIVE
GFR SERPL CREATININE-BSD FRML MDRD: 61 ML/MIN/1.73
GLOBULIN UR ELPH-MCNC: 2.7 GM/DL
GLUCOSE BLD-MCNC: 84 MG/DL (ref 70–110)
GLUCOSE UR STRIP-MCNC: NEGATIVE MG/DL
HCT VFR BLD AUTO: 42.8 % (ref 42–52)
HGB BLD-MCNC: 14.7 G/DL (ref 14–18)
HGB UR QL STRIP.AUTO: ABNORMAL
HOLD SPECIMEN: NORMAL
HOLD SPECIMEN: NORMAL
HYALINE CASTS UR QL AUTO: ABNORMAL /LPF
IMM GRANULOCYTES # BLD AUTO: 0.01 10*3/MM3 (ref 0–0.03)
IMM GRANULOCYTES NFR BLD AUTO: 0.2 % (ref 0–0.5)
KETONES UR QL STRIP: NEGATIVE
LEUKOCYTE ESTERASE UR QL STRIP.AUTO: NEGATIVE
LIPASE SERPL-CCNC: 45 U/L (ref 13–60)
LYMPHOCYTES # BLD AUTO: 0.61 10*3/MM3 (ref 1–3)
LYMPHOCYTES NFR BLD AUTO: 11.6 % (ref 21–51)
MAGNESIUM SERPL-MCNC: 2.1 MG/DL (ref 1.7–2.6)
MCH RBC QN AUTO: 28.7 PG (ref 27–33)
MCHC RBC AUTO-ENTMCNC: 34.3 G/DL (ref 33–37)
MCV RBC AUTO: 83.6 FL (ref 80–94)
MONOCYTES # BLD AUTO: 0.91 10*3/MM3 (ref 0.1–0.9)
MONOCYTES NFR BLD AUTO: 17.3 % (ref 0–10)
MUCOUS THREADS URNS QL MICRO: ABNORMAL /HPF
NEUTROPHILS # BLD AUTO: 3.62 10*3/MM3 (ref 1.4–6.5)
NEUTROPHILS NFR BLD AUTO: 68.8 % (ref 30–70)
NITRITE UR QL STRIP: NEGATIVE
OSMOLALITY SERPL CALC.SUM OF ELEC: 279.8 MOSM/KG (ref 273–305)
PH UR STRIP.AUTO: 5.5 [PH] (ref 5–8)
PLATELET # BLD AUTO: 148 10*3/MM3 (ref 130–400)
PMV BLD AUTO: 10.5 FL (ref 6–10)
POTASSIUM BLD-SCNC: 3.2 MMOL/L (ref 3.5–5.3)
PROT SERPL-MCNC: 7.2 G/DL (ref 6–8)
PROT UR QL STRIP: ABNORMAL
RBC # BLD AUTO: 5.12 10*6/MM3 (ref 4.7–6.1)
RBC # UR: ABNORMAL /HPF
REF LAB TEST METHOD: ABNORMAL
SODIUM BLD-SCNC: 140 MMOL/L (ref 135–153)
SP GR UR STRIP: >=1.03 (ref 1–1.03)
SQUAMOUS #/AREA URNS HPF: ABNORMAL /HPF
UROBILINOGEN UR QL STRIP: ABNORMAL
WBC NRBC COR # BLD: 5.26 10*3/MM3 (ref 4.5–12.5)
WBC UR QL AUTO: ABNORMAL /HPF
WHOLE BLOOD HOLD SPECIMEN: NORMAL
WHOLE BLOOD HOLD SPECIMEN: NORMAL

## 2018-12-29 PROCEDURE — 82150 ASSAY OF AMYLASE: CPT | Performed by: PHYSICIAN ASSISTANT

## 2018-12-29 PROCEDURE — 83735 ASSAY OF MAGNESIUM: CPT | Performed by: PHYSICIAN ASSISTANT

## 2018-12-29 PROCEDURE — 86140 C-REACTIVE PROTEIN: CPT | Performed by: PHYSICIAN ASSISTANT

## 2018-12-29 PROCEDURE — 96365 THER/PROPH/DIAG IV INF INIT: CPT

## 2018-12-29 PROCEDURE — 85025 COMPLETE CBC W/AUTO DIFF WBC: CPT | Performed by: EMERGENCY MEDICINE

## 2018-12-29 PROCEDURE — 25010000002 ONDANSETRON PER 1 MG: Performed by: PHYSICIAN ASSISTANT

## 2018-12-29 PROCEDURE — 99284 EMERGENCY DEPT VISIT MOD MDM: CPT

## 2018-12-29 PROCEDURE — 96375 TX/PRO/DX INJ NEW DRUG ADDON: CPT

## 2018-12-29 PROCEDURE — 94799 UNLISTED PULMONARY SVC/PX: CPT

## 2018-12-29 PROCEDURE — 83690 ASSAY OF LIPASE: CPT | Performed by: EMERGENCY MEDICINE

## 2018-12-29 PROCEDURE — 87804 INFLUENZA ASSAY W/OPTIC: CPT | Performed by: PHYSICIAN ASSISTANT

## 2018-12-29 PROCEDURE — 25010000002 METHYLPREDNISOLONE PER 125 MG: Performed by: PHYSICIAN ASSISTANT

## 2018-12-29 PROCEDURE — 87040 BLOOD CULTURE FOR BACTERIA: CPT | Performed by: PHYSICIAN ASSISTANT

## 2018-12-29 PROCEDURE — 25010000002 CEFTRIAXONE: Performed by: PHYSICIAN ASSISTANT

## 2018-12-29 PROCEDURE — 81001 URINALYSIS AUTO W/SCOPE: CPT | Performed by: EMERGENCY MEDICINE

## 2018-12-29 PROCEDURE — 80053 COMPREHEN METABOLIC PANEL: CPT | Performed by: EMERGENCY MEDICINE

## 2018-12-29 PROCEDURE — 71046 X-RAY EXAM CHEST 2 VIEWS: CPT | Performed by: RADIOLOGY

## 2018-12-29 PROCEDURE — 94640 AIRWAY INHALATION TREATMENT: CPT

## 2018-12-29 PROCEDURE — 71046 X-RAY EXAM CHEST 2 VIEWS: CPT

## 2018-12-29 RX ORDER — GUAIFENESIN DEXTROMETHORPHAN HYDROBROMIDE ORAL SOLUTION 10; 100 MG/5ML; MG/5ML
5 SOLUTION ORAL EVERY 12 HOURS
Qty: 118 ML | Refills: 0 | Status: SHIPPED | OUTPATIENT
Start: 2018-12-29 | End: 2019-01-11

## 2018-12-29 RX ORDER — OSELTAMIVIR PHOSPHATE 75 MG/1
75 CAPSULE ORAL EVERY 12 HOURS
Qty: 10 CAPSULE | Refills: 0 | Status: SHIPPED | OUTPATIENT
Start: 2018-12-29 | End: 2019-01-03

## 2018-12-29 RX ORDER — POTASSIUM CHLORIDE 20 MEQ/1
40 TABLET, EXTENDED RELEASE ORAL ONCE
Status: COMPLETED | OUTPATIENT
Start: 2018-12-29 | End: 2018-12-29

## 2018-12-29 RX ORDER — METHYLPREDNISOLONE SODIUM SUCCINATE 125 MG/2ML
125 INJECTION, POWDER, LYOPHILIZED, FOR SOLUTION INTRAMUSCULAR; INTRAVENOUS ONCE
Status: COMPLETED | OUTPATIENT
Start: 2018-12-29 | End: 2018-12-29

## 2018-12-29 RX ORDER — IPRATROPIUM BROMIDE AND ALBUTEROL SULFATE 2.5; .5 MG/3ML; MG/3ML
3 SOLUTION RESPIRATORY (INHALATION) ONCE
Status: COMPLETED | OUTPATIENT
Start: 2018-12-29 | End: 2018-12-29

## 2018-12-29 RX ORDER — METHYLPREDNISOLONE 4 MG/1
TABLET ORAL
Qty: 21 TABLET | Refills: 0 | Status: SHIPPED | OUTPATIENT
Start: 2018-12-29 | End: 2019-01-11

## 2018-12-29 RX ORDER — OSELTAMIVIR PHOSPHATE 75 MG/1
75 CAPSULE ORAL ONCE
Status: COMPLETED | OUTPATIENT
Start: 2018-12-29 | End: 2018-12-29

## 2018-12-29 RX ORDER — DOXYCYCLINE 100 MG/1
100 CAPSULE ORAL 2 TIMES DAILY
Qty: 20 CAPSULE | Refills: 0 | Status: SHIPPED | OUTPATIENT
Start: 2018-12-29 | End: 2019-01-08

## 2018-12-29 RX ORDER — DOXYCYCLINE 100 MG/1
100 CAPSULE ORAL ONCE
Status: COMPLETED | OUTPATIENT
Start: 2018-12-29 | End: 2018-12-29

## 2018-12-29 RX ORDER — SODIUM CHLORIDE 0.9 % (FLUSH) 0.9 %
10 SYRINGE (ML) INJECTION AS NEEDED
Status: DISCONTINUED | OUTPATIENT
Start: 2018-12-29 | End: 2018-12-29 | Stop reason: HOSPADM

## 2018-12-29 RX ORDER — ONDANSETRON 2 MG/ML
4 INJECTION INTRAMUSCULAR; INTRAVENOUS ONCE
Status: COMPLETED | OUTPATIENT
Start: 2018-12-29 | End: 2018-12-29

## 2018-12-29 RX ADMIN — IPRATROPIUM BROMIDE AND ALBUTEROL SULFATE 3 ML: .5; 3 SOLUTION RESPIRATORY (INHALATION) at 17:05

## 2018-12-29 RX ADMIN — SODIUM CHLORIDE 1000 ML: 9 INJECTION, SOLUTION INTRAVENOUS at 17:01

## 2018-12-29 RX ADMIN — POTASSIUM CHLORIDE 40 MEQ: 1500 TABLET, EXTENDED RELEASE ORAL at 18:01

## 2018-12-29 RX ADMIN — DOXYCYCLINE 100 MG: 100 CAPSULE ORAL at 18:47

## 2018-12-29 RX ADMIN — IPRATROPIUM BROMIDE AND ALBUTEROL SULFATE 3 ML: .5; 3 SOLUTION RESPIRATORY (INHALATION) at 18:39

## 2018-12-29 RX ADMIN — OSELTAMIVIR PHOSPHATE 75 MG: 75 CAPSULE ORAL at 18:25

## 2018-12-29 RX ADMIN — CEFTRIAXONE 1 G: 1 INJECTION, POWDER, FOR SOLUTION INTRAMUSCULAR; INTRAVENOUS at 18:01

## 2018-12-29 RX ADMIN — ONDANSETRON 4 MG: 2 INJECTION, SOLUTION INTRAMUSCULAR; INTRAVENOUS at 17:01

## 2018-12-29 RX ADMIN — METHYLPREDNISOLONE SODIUM SUCCINATE 125 MG: 125 INJECTION, POWDER, FOR SOLUTION INTRAMUSCULAR; INTRAVENOUS at 17:01

## 2019-01-03 LAB
BACTERIA SPEC AEROBE CULT: NORMAL
BACTERIA SPEC AEROBE CULT: NORMAL

## 2019-01-11 ENCOUNTER — OFFICE VISIT (OUTPATIENT)
Dept: FAMILY MEDICINE CLINIC | Facility: CLINIC | Age: 54
End: 2019-01-11

## 2019-01-11 VITALS
WEIGHT: 201 LBS | HEART RATE: 83 BPM | DIASTOLIC BLOOD PRESSURE: 92 MMHG | BODY MASS INDEX: 28.14 KG/M2 | OXYGEN SATURATION: 99 % | TEMPERATURE: 97.8 F | SYSTOLIC BLOOD PRESSURE: 163 MMHG | HEIGHT: 71 IN

## 2019-01-11 DIAGNOSIS — Z20.2 EXPOSURE TO SEXUALLY TRANSMITTED DISEASE (STD): ICD-10-CM

## 2019-01-11 DIAGNOSIS — Z11.4 ENCOUNTER FOR SCREENING FOR HIV: ICD-10-CM

## 2019-01-11 DIAGNOSIS — J11.1 INFLUENZA: Primary | ICD-10-CM

## 2019-01-11 DIAGNOSIS — J18.9 PNEUMONIA OF BOTH LUNGS DUE TO INFECTIOUS ORGANISM, UNSPECIFIED PART OF LUNG: ICD-10-CM

## 2019-01-11 DIAGNOSIS — E87.6 HYPOKALEMIA: ICD-10-CM

## 2019-01-11 DIAGNOSIS — R79.89 ABNORMAL LIVER FUNCTION TESTS: ICD-10-CM

## 2019-01-11 LAB
ALBUMIN SERPL-MCNC: 4.2 G/DL (ref 3.5–5)
ALBUMIN/GLOB SERPL: 1.6 G/DL (ref 1.5–2.5)
ALP SERPL-CCNC: 75 U/L (ref 40–129)
ALT SERPL W P-5'-P-CCNC: 35 U/L (ref 10–44)
ANION GAP SERPL CALCULATED.3IONS-SCNC: 7.4 MMOL/L (ref 3.6–11.2)
AST SERPL-CCNC: 23 U/L (ref 10–34)
BASOPHILS # BLD AUTO: 0.02 10*3/MM3 (ref 0–0.3)
BASOPHILS NFR BLD AUTO: 0.2 % (ref 0–2)
BILIRUB SERPL-MCNC: 0.6 MG/DL (ref 0.2–1.8)
BUN BLD-MCNC: 13 MG/DL (ref 7–21)
BUN/CREAT SERPL: 14.1 (ref 7–25)
CALCIUM SPEC-SCNC: 9 MG/DL (ref 7.7–10)
CHLORIDE SERPL-SCNC: 108 MMOL/L (ref 99–112)
CO2 SERPL-SCNC: 24.6 MMOL/L (ref 24.3–31.9)
CREAT BLD-MCNC: 0.92 MG/DL (ref 0.43–1.29)
DEPRECATED RDW RBC AUTO: 40.8 FL (ref 37–54)
EOSINOPHIL # BLD AUTO: 0.14 10*3/MM3 (ref 0–0.7)
EOSINOPHIL NFR BLD AUTO: 1.7 % (ref 0–5)
ERYTHROCYTE [DISTWIDTH] IN BLOOD BY AUTOMATED COUNT: 13.4 % (ref 11.5–14.5)
GFR SERPL CREATININE-BSD FRML MDRD: 85 ML/MIN/1.73
GLOBULIN UR ELPH-MCNC: 2.7 GM/DL
GLUCOSE BLD-MCNC: 83 MG/DL (ref 70–110)
HAV IGM SERPL QL IA: NORMAL
HBV CORE IGM SERPL QL IA: NORMAL
HBV SURFACE AG SERPL QL IA: NORMAL
HCT VFR BLD AUTO: 43.8 % (ref 42–52)
HCV AB SER DONR QL: NORMAL
HGB BLD-MCNC: 14.7 G/DL (ref 14–18)
HIV1+2 AB SER QL: NORMAL
IMM GRANULOCYTES # BLD AUTO: 0.04 10*3/MM3 (ref 0–0.03)
IMM GRANULOCYTES NFR BLD AUTO: 0.5 % (ref 0–0.5)
LYMPHOCYTES # BLD AUTO: 1.75 10*3/MM3 (ref 1–3)
LYMPHOCYTES NFR BLD AUTO: 21.2 % (ref 21–51)
MAGNESIUM SERPL-MCNC: 2.3 MG/DL (ref 1.7–2.6)
MCH RBC QN AUTO: 28.4 PG (ref 27–33)
MCHC RBC AUTO-ENTMCNC: 33.6 G/DL (ref 33–37)
MCV RBC AUTO: 84.7 FL (ref 80–94)
MONOCYTES # BLD AUTO: 0.67 10*3/MM3 (ref 0.1–0.9)
MONOCYTES NFR BLD AUTO: 8.1 % (ref 0–10)
NEUTROPHILS # BLD AUTO: 5.65 10*3/MM3 (ref 1.4–6.5)
NEUTROPHILS NFR BLD AUTO: 68.3 % (ref 30–70)
OSMOLALITY SERPL CALC.SUM OF ELEC: 278.7 MOSM/KG (ref 273–305)
PLATELET # BLD AUTO: 237 10*3/MM3 (ref 130–400)
PMV BLD AUTO: 10 FL (ref 6–10)
POTASSIUM BLD-SCNC: 3.7 MMOL/L (ref 3.5–5.3)
PROT SERPL-MCNC: 6.9 G/DL (ref 6–8)
RBC # BLD AUTO: 5.17 10*6/MM3 (ref 4.7–6.1)
SODIUM BLD-SCNC: 140 MMOL/L (ref 135–153)
VIT B12 BLD-MCNC: 543 PG/ML (ref 211–911)
WBC NRBC COR # BLD: 8.27 10*3/MM3 (ref 4.5–12.5)

## 2019-01-11 PROCEDURE — G0432 EIA HIV-1/HIV-2 SCREEN: HCPCS | Performed by: NURSE PRACTITIONER

## 2019-01-11 PROCEDURE — 80074 ACUTE HEPATITIS PANEL: CPT | Performed by: NURSE PRACTITIONER

## 2019-01-11 PROCEDURE — 83735 ASSAY OF MAGNESIUM: CPT | Performed by: NURSE PRACTITIONER

## 2019-01-11 PROCEDURE — 80053 COMPREHEN METABOLIC PANEL: CPT | Performed by: NURSE PRACTITIONER

## 2019-01-11 PROCEDURE — 85025 COMPLETE CBC W/AUTO DIFF WBC: CPT | Performed by: NURSE PRACTITIONER

## 2019-01-11 PROCEDURE — 82607 VITAMIN B-12: CPT | Performed by: NURSE PRACTITIONER

## 2019-01-11 PROCEDURE — 99214 OFFICE O/P EST MOD 30 MIN: CPT | Performed by: NURSE PRACTITIONER

## 2019-01-11 RX ORDER — PAROXETINE 10 MG/1
5 TABLET, FILM COATED ORAL EVERY MORNING
COMMUNITY
End: 2019-03-11 | Stop reason: SDUPTHER

## 2019-01-11 RX ORDER — ERGOCALCIFEROL 1.25 MG/1
50000 CAPSULE ORAL WEEKLY
Qty: 4 CAPSULE | Refills: 2 | Status: SHIPPED | OUTPATIENT
Start: 2019-01-11 | End: 2019-03-20

## 2019-01-11 NOTE — PROGRESS NOTES
Subjective   Marilee Taylor is a 53 y.o. male.     Chief Complaint: Follow-up and Pneumonia    History of Present Illness   Pt went to [facility] ER for  evaluation of cough and body aches on 12/29/2018 and was dx with pneumonia and influenza.  He was given rocephin injection and prescriptions for tamiflu and doxycycline and robitussin and has completed the prescription.  He was given a work excuse up until today.    He is feeling much better and feels he is ready to go back to work.  His symptoms have completely resolved at this time.   He would like to have some labs drawn today.  He states that he has been cleaning out an apartment that apparently the tenant possibly had STD.  He would like to be checked for HIV and Hepatitis at this time.     Family History   Problem Relation Age of Onset   • Diabetes Mother    • Hypertension Mother    • Stroke Mother    • Heart disease Mother    • Rheum arthritis Father    • Heart disease Other    • Hypertension Other        Social History     Socioeconomic History   • Marital status: Single     Spouse name: Not on file   • Number of children: Not on file   • Years of education: Not on file   • Highest education level: Not on file   Social Needs   • Financial resource strain: Not on file   • Food insecurity - worry: Not on file   • Food insecurity - inability: Not on file   • Transportation needs - medical: Not on file   • Transportation needs - non-medical: Not on file   Occupational History   • Not on file   Tobacco Use   • Smoking status: Former Smoker   • Smokeless tobacco: Never Used   Substance and Sexual Activity   • Alcohol use: No     Comment: stopped drinking alcohol   • Drug use: No   • Sexual activity: Defer   Other Topics Concern   • Not on file   Social History Narrative   • Not on file       Past Medical History:   Diagnosis Date   • Anxiety    • Bipolar disorder (CMS/HCC)    • Depression    • Hyperlipidemia    • Hypertension    • PTSD (post-traumatic stress disorder)   "  • Vitamin B12 deficiency        Review of Systems   Constitutional: Negative.    HENT: Negative.    Respiratory: Negative.    Cardiovascular: Negative.    Gastrointestinal: Negative.    Musculoskeletal: Negative.    Skin: Negative.    Neurological: Negative.    Psychiatric/Behavioral: Negative.        Objective   Physical Exam   Constitutional: He is oriented to person, place, and time. He appears well-developed and well-nourished.   Eyes: Conjunctivae are normal. Pupils are equal, round, and reactive to light.   Neck: Normal range of motion. Neck supple.   Cardiovascular: Normal rate, regular rhythm and normal heart sounds.   Pulmonary/Chest: Effort normal and breath sounds normal.   Neurological: He is alert and oriented to person, place, and time.   Skin: Skin is warm and dry.   Psychiatric: He has a normal mood and affect. His behavior is normal. Judgment and thought content normal.   Nursing note and vitals reviewed.      Procedures    Vitals: Blood pressure 163/92, pulse 83, temperature 97.8 °F (36.6 °C), temperature source Oral, height 180.3 cm (71\"), weight 91.2 kg (201 lb), SpO2 99 %.    Allergies:   Allergies   Allergen Reactions   • Demerol Hcl [Meperidine]    • Levaquin [Levofloxacin]    • Morphine And Related    • Prozac [Fluoxetine Hcl]         During this visit the following were done:  Labs Reviewed []    Labs Ordered []    Radiology Reports Reviewed []    Radiology Ordered []    PCP Records Reviewed []    Referring Provider Records Reviewed []    ER Records Reviewed []    Hospital Records Reviewed []    History Obtained From Family []    Radiology Images Reviewed []    Other Reviewed []    Records Requested []      Assessment/Plan   Marilee was seen today for follow-up and pneumonia.    Diagnoses and all orders for this visit:    Influenza  -     vitamin D (ERGOCALCIFEROL) 92018 units capsule capsule; Take 1 capsule by mouth 1 (One) Time Per Week.  -     CBC & Differential  -     Comprehensive " Metabolic Panel  -     Magnesium  -     Vitamin B12  -     Hepatitis Panel, Acute  -     HIV-1/O/2 Ag/Ab w Reflex; Future    Pneumonia of both lungs due to infectious organism, unspecified part of lung  -     vitamin D (ERGOCALCIFEROL) 68682 units capsule capsule; Take 1 capsule by mouth 1 (One) Time Per Week.  -     CBC & Differential  -     Comprehensive Metabolic Panel  -     Magnesium  -     Vitamin B12  -     Hepatitis Panel, Acute  -     HIV-1/O/2 Ag/Ab w Reflex; Future    Hypokalemia  -     vitamin D (ERGOCALCIFEROL) 12064 units capsule capsule; Take 1 capsule by mouth 1 (One) Time Per Week.  -     CBC & Differential  -     Comprehensive Metabolic Panel  -     Magnesium  -     Vitamin B12  -     Hepatitis Panel, Acute  -     HIV-1/O/2 Ag/Ab w Reflex; Future    Exposure to sexually transmitted disease (STD)  -     vitamin D (ERGOCALCIFEROL) 44496 units capsule capsule; Take 1 capsule by mouth 1 (One) Time Per Week.  -     CBC & Differential  -     Comprehensive Metabolic Panel  -     Magnesium  -     Vitamin B12  -     Hepatitis Panel, Acute  -     HIV-1/O/2 Ag/Ab w Reflex; Future    Abnormal liver function tests  -     vitamin D (ERGOCALCIFEROL) 93487 units capsule capsule; Take 1 capsule by mouth 1 (One) Time Per Week.  -     CBC & Differential  -     Comprehensive Metabolic Panel  -     Magnesium  -     Vitamin B12  -     Hepatitis Panel, Acute  -     HIV-1/O/2 Ag/Ab w Reflex; Future    Encounter for screening for HIV   -     HIV-1/O/2 Ag/Ab w Reflex; Future        May return to work  Work release today with no restrictions

## 2019-01-15 ENCOUNTER — TELEPHONE (OUTPATIENT)
Dept: FAMILY MEDICINE CLINIC | Facility: CLINIC | Age: 54
End: 2019-01-15

## 2019-01-15 NOTE — TELEPHONE ENCOUNTER
----- Message from BJORN Vincent sent at 1/15/2019  1:11 PM EST -----  Labs appear to be normal.  Hep panel negative along with HIV.  Please let  pt know.      Patient notified & verbalized understanding.

## 2019-03-09 ENCOUNTER — HOSPITAL ENCOUNTER (EMERGENCY)
Facility: HOSPITAL | Age: 54
Discharge: HOME | End: 2019-03-09
Attending: EMERGENCY MEDICINE | Admitting: EMERGENCY MEDICINE

## 2019-03-09 VITALS
HEIGHT: 71 IN | WEIGHT: 192 LBS | HEART RATE: 73 BPM | BODY MASS INDEX: 26.88 KG/M2 | DIASTOLIC BLOOD PRESSURE: 108 MMHG | SYSTOLIC BLOOD PRESSURE: 155 MMHG | RESPIRATION RATE: 18 BRPM | OXYGEN SATURATION: 96 % | TEMPERATURE: 98 F

## 2019-03-09 DIAGNOSIS — S33.5XXA LUMBAR BACK SPRAIN, INITIAL ENCOUNTER: Primary | ICD-10-CM

## 2019-03-09 PROCEDURE — 25010000002 METHYLPREDNISOLONE PER 125 MG: Performed by: PHYSICIAN ASSISTANT

## 2019-03-09 PROCEDURE — 96372 THER/PROPH/DIAG INJ SC/IM: CPT

## 2019-03-09 PROCEDURE — 99283 EMERGENCY DEPT VISIT LOW MDM: CPT

## 2019-03-09 PROCEDURE — 25010000002 KETOROLAC TROMETHAMINE PER 15 MG: Performed by: PHYSICIAN ASSISTANT

## 2019-03-09 RX ORDER — KETOROLAC TROMETHAMINE 30 MG/ML
60 INJECTION, SOLUTION INTRAMUSCULAR; INTRAVENOUS ONCE
Status: COMPLETED | OUTPATIENT
Start: 2019-03-09 | End: 2019-03-09

## 2019-03-09 RX ORDER — ORPHENADRINE CITRATE 30 MG/ML
60 INJECTION INTRAMUSCULAR; INTRAVENOUS ONCE
Status: DISCONTINUED | OUTPATIENT
Start: 2019-03-09 | End: 2019-03-09

## 2019-03-09 RX ORDER — METHYLPREDNISOLONE SODIUM SUCCINATE 125 MG/2ML
125 INJECTION, POWDER, LYOPHILIZED, FOR SOLUTION INTRAMUSCULAR; INTRAVENOUS ONCE
Status: COMPLETED | OUTPATIENT
Start: 2019-03-09 | End: 2019-03-09

## 2019-03-09 RX ORDER — KETOROLAC TROMETHAMINE 10 MG/1
10 TABLET, FILM COATED ORAL EVERY 6 HOURS PRN
Qty: 20 TABLET | Refills: 0 | Status: SHIPPED | OUTPATIENT
Start: 2019-03-09 | End: 2019-10-24

## 2019-03-09 RX ORDER — ORPHENADRINE CITRATE 100 MG/1
100 TABLET, EXTENDED RELEASE ORAL 2 TIMES DAILY
Qty: 20 TABLET | Refills: 0 | Status: SHIPPED | OUTPATIENT
Start: 2019-03-09 | End: 2019-10-24

## 2019-03-09 RX ORDER — PREDNISONE 20 MG/1
20 TABLET ORAL DAILY
Qty: 5 TABLET | Refills: 0 | Status: SHIPPED | OUTPATIENT
Start: 2019-03-09 | End: 2019-10-24

## 2019-03-09 RX ADMIN — KETOROLAC TROMETHAMINE 60 MG: 60 INJECTION, SOLUTION INTRAMUSCULAR at 19:08

## 2019-03-09 RX ADMIN — METHYLPREDNISOLONE SODIUM SUCCINATE 125 MG: 125 INJECTION, POWDER, FOR SOLUTION INTRAMUSCULAR; INTRAVENOUS at 19:10

## 2019-03-09 NOTE — ED PROVIDER NOTES
Subjective   This is a 54-year-old male who comes in with chief complaint lumbar back pain.  Patient states that he was lifting a trailer when twisted his back and felt sudden sharp, stabbing pain.  Patient denies that pain radiates down lower extremities.         History provided by:  Patient   used: No    Back Pain   Location:  Lumbar spine  Quality:  Aching  Pain severity:  Moderate  Pain is:  Worse during the day  Onset quality:  Sudden  Duration:  4 days  Timing:  Intermittent  Progression:  Worsening  Chronicity:  New  Context: recent illness, recent injury and twisting    Context: not emotional stress, not falling, not jumping from heights, not lifting heavy objects and not MCA    Relieved by:  Nothing  Worsened by:  Nothing  Ineffective treatments:  None tried  Associated symptoms: weakness    Associated symptoms: no abdominal pain, no abdominal swelling, no bladder incontinence, no chest pain, no dysuria, no fever, no headaches and no leg pain    Risk factors: no hx of cancer, no hx of osteoporosis, no menopause, not obese, no recent surgery, no steroid use and no vascular disease        Review of Systems   Constitutional: Negative for fever.   Eyes: Negative.  Negative for photophobia.   Respiratory: Negative.  Negative for apnea, choking and chest tightness.    Cardiovascular: Negative for chest pain.   Gastrointestinal: Negative for abdominal pain.   Genitourinary: Negative.  Negative for bladder incontinence, dysuria, enuresis, flank pain, frequency and genital sores.   Musculoskeletal: Positive for arthralgias, back pain and myalgias.   Skin: Negative.  Negative for color change, pallor and wound.   Neurological: Positive for weakness. Negative for headaches.   Hematological: Negative.  Negative for adenopathy. Does not bruise/bleed easily.   Psychiatric/Behavioral: Negative.  Negative for behavioral problems, confusion, decreased concentration, dysphoric mood and hallucinations.    All other systems reviewed and are negative.      Past Medical History:   Diagnosis Date   • Anxiety    • Bipolar disorder (CMS/HCC)    • Depression    • Hyperlipidemia    • Hypertension    • PTSD (post-traumatic stress disorder)    • Vitamin B12 deficiency        Allergies   Allergen Reactions   • Demerol Hcl [Meperidine]    • Levaquin [Levofloxacin]    • Morphine And Related    • Prozac [Fluoxetine Hcl]        Past Surgical History:   Procedure Laterality Date   • ABDOMINAL SURGERY     • LEG SURGERY Right        Family History   Problem Relation Age of Onset   • Diabetes Mother    • Hypertension Mother    • Stroke Mother    • Heart disease Mother    • Rheum arthritis Father    • Heart disease Other    • Hypertension Other        Social History     Socioeconomic History   • Marital status: Single     Spouse name: Not on file   • Number of children: Not on file   • Years of education: Not on file   • Highest education level: Not on file   Tobacco Use   • Smoking status: Former Smoker   • Smokeless tobacco: Never Used   Substance and Sexual Activity   • Alcohol use: No     Comment: stopped drinking alcohol   • Drug use: No   • Sexual activity: Defer           Objective   Physical Exam   Constitutional: He is oriented to person, place, and time. He appears well-developed and well-nourished.   HENT:   Head: Normocephalic.   Right Ear: External ear normal.   Left Ear: External ear normal.   Nose: Nose normal.   Mouth/Throat: Oropharynx is clear and moist. No oropharyngeal exudate.   Eyes: Conjunctivae and EOM are normal. Pupils are equal, round, and reactive to light. Right eye exhibits no discharge. Left eye exhibits no discharge. No scleral icterus.   Neck: Normal range of motion. Neck supple. No JVD present. No tracheal deviation present. No thyromegaly present.   Cardiovascular: Normal rate, regular rhythm and intact distal pulses. Exam reveals no friction rub.   No murmur heard.  Pulmonary/Chest: Effort normal  and breath sounds normal. No stridor. No respiratory distress. He has no wheezes. He has no rales. He exhibits no tenderness.   Abdominal: Soft. Bowel sounds are normal. He exhibits no distension and no mass. There is no tenderness. There is no rebound and no guarding. No hernia.   Musculoskeletal: He exhibits tenderness.        Lumbar back: He exhibits decreased range of motion, tenderness, pain and spasm.   Lymphadenopathy:     He has no cervical adenopathy.   Neurological: He is alert and oriented to person, place, and time. He displays normal reflexes. No cranial nerve deficit or sensory deficit. He exhibits normal muscle tone. Coordination normal.   Skin: Skin is warm and dry. Capillary refill takes less than 2 seconds. No rash noted. No erythema. No pallor.   Psychiatric: He has a normal mood and affect. His behavior is normal. Judgment and thought content normal.   Nursing note and vitals reviewed.      Procedures           ED Course  ED Course as of Mar 09 1958   Sat Mar 09, 2019   1951 This is a 54-year-old male who comes in with chief complaint lumbar back pain. Advised to follow-up with pcp with for MRI if pain persist.   [BH]      ED Course User Index  [BH] Baron Sandoval PA-C                  [facility]      Final diagnoses:   Lumbar back sprain, initial encounter            Baron Sandoval PA-C  03/09/19 1957       Baron Sandoval PA-C  03/09/19 1958

## 2019-03-11 RX ORDER — PAROXETINE 10 MG/1
5 TABLET, FILM COATED ORAL EVERY MORNING
Qty: 30 TABLET | Refills: 0 | Status: SHIPPED | OUTPATIENT
Start: 2019-03-11 | End: 2019-05-02 | Stop reason: SDUPTHER

## 2019-03-11 NOTE — TELEPHONE ENCOUNTER
Patient had an appointment today for Peyton but she is going to be out due to family emergency.  Can you please refil patient's Paxil?

## 2019-03-19 ENCOUNTER — OFFICE VISIT (OUTPATIENT)
Dept: FAMILY MEDICINE CLINIC | Facility: CLINIC | Age: 54
End: 2019-03-19

## 2019-03-19 VITALS
SYSTOLIC BLOOD PRESSURE: 132 MMHG | DIASTOLIC BLOOD PRESSURE: 88 MMHG | HEART RATE: 84 BPM | HEIGHT: 71 IN | TEMPERATURE: 97.9 F | BODY MASS INDEX: 27.44 KG/M2 | WEIGHT: 196 LBS | OXYGEN SATURATION: 99 %

## 2019-03-19 DIAGNOSIS — S33.5XXA LUMBAR BACK SPRAIN, INITIAL ENCOUNTER: Primary | ICD-10-CM

## 2019-03-19 DIAGNOSIS — I10 ESSENTIAL HYPERTENSION: ICD-10-CM

## 2019-03-19 DIAGNOSIS — K21.9 GASTROESOPHAGEAL REFLUX DISEASE, ESOPHAGITIS PRESENCE NOT SPECIFIED: ICD-10-CM

## 2019-03-19 DIAGNOSIS — E53.8 COBALAMIN DEFICIENCY: ICD-10-CM

## 2019-03-19 DIAGNOSIS — E78.5 HYPERLIPIDEMIA, UNSPECIFIED HYPERLIPIDEMIA TYPE: ICD-10-CM

## 2019-03-19 DIAGNOSIS — E55.9 VITAMIN D DEFICIENCY: ICD-10-CM

## 2019-03-19 LAB
25(OH)D3 SERPL-MCNC: 16.2 NG/ML (ref 30–100)
ALBUMIN SERPL-MCNC: 4.5 G/DL (ref 3.5–5.2)
ALBUMIN/GLOB SERPL: 1.6 G/DL
ALP SERPL-CCNC: 68 U/L (ref 39–117)
ALT SERPL W P-5'-P-CCNC: 37 U/L (ref 1–41)
ANION GAP SERPL CALCULATED.3IONS-SCNC: 12.5 MMOL/L
AST SERPL-CCNC: 22 U/L (ref 1–40)
BASOPHILS # BLD AUTO: 0.04 10*3/MM3 (ref 0–0.2)
BASOPHILS NFR BLD AUTO: 0.5 % (ref 0–1.5)
BILIRUB SERPL-MCNC: 0.6 MG/DL (ref 0.2–1.2)
BUN BLD-MCNC: 10 MG/DL (ref 6–20)
BUN/CREAT SERPL: 11.4 (ref 7–25)
CALCIUM SPEC-SCNC: 9.4 MG/DL (ref 8.6–10.5)
CHLORIDE SERPL-SCNC: 105 MMOL/L (ref 98–107)
CHOLEST SERPL-MCNC: 218 MG/DL (ref 0–200)
CO2 SERPL-SCNC: 25.5 MMOL/L (ref 22–29)
CREAT BLD-MCNC: 0.88 MG/DL (ref 0.76–1.27)
DEPRECATED RDW RBC AUTO: 44.9 FL (ref 37–54)
EOSINOPHIL # BLD AUTO: 0.19 10*3/MM3 (ref 0–0.4)
EOSINOPHIL NFR BLD AUTO: 2.6 % (ref 0.3–6.2)
ERYTHROCYTE [DISTWIDTH] IN BLOOD BY AUTOMATED COUNT: 14.1 % (ref 12.3–15.4)
GFR SERPL CREATININE-BSD FRML MDRD: 90 ML/MIN/1.73
GLOBULIN UR ELPH-MCNC: 2.8 GM/DL
GLUCOSE BLD-MCNC: 94 MG/DL (ref 65–99)
HCT VFR BLD AUTO: 45.9 % (ref 37.5–51)
HDLC SERPL-MCNC: 30 MG/DL (ref 40–60)
HGB BLD-MCNC: 15 G/DL (ref 13–17.7)
IMM GRANULOCYTES # BLD AUTO: 0.07 10*3/MM3 (ref 0–0.05)
IMM GRANULOCYTES NFR BLD AUTO: 0.9 % (ref 0–0.5)
LDLC SERPL CALC-MCNC: 118 MG/DL (ref 0–100)
LDLC/HDLC SERPL: 3.92 {RATIO}
LYMPHOCYTES # BLD AUTO: 1.99 10*3/MM3 (ref 0.7–3.1)
LYMPHOCYTES NFR BLD AUTO: 26.7 % (ref 19.6–45.3)
MAGNESIUM SERPL-MCNC: 2.4 MG/DL (ref 1.6–2.6)
MCH RBC QN AUTO: 28.8 PG (ref 26.6–33)
MCHC RBC AUTO-ENTMCNC: 32.7 G/DL (ref 31.5–35.7)
MCV RBC AUTO: 88.3 FL (ref 79–97)
MONOCYTES # BLD AUTO: 0.61 10*3/MM3 (ref 0.1–0.9)
MONOCYTES NFR BLD AUTO: 8.2 % (ref 5–12)
NEUTROPHILS # BLD AUTO: 4.54 10*3/MM3 (ref 1.4–7)
NEUTROPHILS NFR BLD AUTO: 61.1 % (ref 42.7–76)
NRBC BLD AUTO-RTO: 0 /100 WBC (ref 0–0)
PLATELET # BLD AUTO: 247 10*3/MM3 (ref 140–450)
PMV BLD AUTO: 10.4 FL (ref 6–12)
POTASSIUM BLD-SCNC: 3.7 MMOL/L (ref 3.5–5.2)
PROT SERPL-MCNC: 7.3 G/DL (ref 6–8.5)
RBC # BLD AUTO: 5.2 10*6/MM3 (ref 4.14–5.8)
SODIUM BLD-SCNC: 143 MMOL/L (ref 136–145)
TRIGL SERPL-MCNC: 352 MG/DL (ref 0–150)
TSH SERPL DL<=0.05 MIU/L-ACNC: 1.86 MIU/ML (ref 0.27–4.2)
VIT B12 BLD-MCNC: 545 PG/ML (ref 211–946)
VLDLC SERPL-MCNC: 70.4 MG/DL (ref 5–40)
WBC NRBC COR # BLD: 7.44 10*3/MM3 (ref 3.4–10.8)

## 2019-03-19 PROCEDURE — 83735 ASSAY OF MAGNESIUM: CPT | Performed by: NURSE PRACTITIONER

## 2019-03-19 PROCEDURE — 82607 VITAMIN B-12: CPT | Performed by: NURSE PRACTITIONER

## 2019-03-19 PROCEDURE — 99213 OFFICE O/P EST LOW 20 MIN: CPT | Performed by: NURSE PRACTITIONER

## 2019-03-19 PROCEDURE — 85025 COMPLETE CBC W/AUTO DIFF WBC: CPT | Performed by: NURSE PRACTITIONER

## 2019-03-19 PROCEDURE — 80053 COMPREHEN METABOLIC PANEL: CPT | Performed by: NURSE PRACTITIONER

## 2019-03-19 PROCEDURE — 84443 ASSAY THYROID STIM HORMONE: CPT | Performed by: NURSE PRACTITIONER

## 2019-03-19 PROCEDURE — 82306 VITAMIN D 25 HYDROXY: CPT | Performed by: NURSE PRACTITIONER

## 2019-03-19 PROCEDURE — 80061 LIPID PANEL: CPT | Performed by: NURSE PRACTITIONER

## 2019-03-19 NOTE — PROGRESS NOTES
Subjective   Marilee Taylor is a 54 y.o. male.     Chief Complaint: Back Pain    Back Pain   This is a new problem. The current episode started 1 to 4 weeks ago. The problem has been resolved since onset. The pain is present in the lumbar spine. The quality of the pain is described as aching. The pain does not radiate. The patient is experiencing no pain. Pertinent negatives include no bladder incontinence or bowel incontinence. He has tried NSAIDs and muscle relaxant for the symptoms. The treatment provided significant relief.   Pt went to the ER and was given medication.  He has been doing epsom salt baths and warm water soaks and has been doing much better.  He has not taken any medication in the past two days.  He needs to return to work and he feels he can go back to work at this time.  He is needing a statement in order to return to work.  I feel that he will be able to return to work without any restrictions.       Family History   Problem Relation Age of Onset   • Diabetes Mother    • Hypertension Mother    • Stroke Mother    • Heart disease Mother    • Rheum arthritis Father    • Heart disease Other    • Hypertension Other        Social History     Socioeconomic History   • Marital status: Single     Spouse name: Not on file   • Number of children: Not on file   • Years of education: Not on file   • Highest education level: Not on file   Social Needs   • Financial resource strain: Not on file   • Food insecurity - worry: Not on file   • Food insecurity - inability: Not on file   • Transportation needs - medical: Not on file   • Transportation needs - non-medical: Not on file   Occupational History   • Not on file   Tobacco Use   • Smoking status: Former Smoker   • Smokeless tobacco: Never Used   Substance and Sexual Activity   • Alcohol use: No     Comment: stopped drinking alcohol   • Drug use: No   • Sexual activity: Defer   Other Topics Concern   • Not on file   Social History Narrative   • Not on file  "      Past Medical History:   Diagnosis Date   • Anxiety    • Bipolar disorder (CMS/HCC)    • Depression    • Hyperlipidemia    • Hypertension    • PTSD (post-traumatic stress disorder)    • Vitamin B12 deficiency        Review of Systems   Constitutional: Negative.    HENT: Negative.    Respiratory: Negative.    Cardiovascular: Negative.    Gastrointestinal: Negative.  Negative for bowel incontinence.   Genitourinary: Negative for bladder incontinence.   Musculoskeletal: Positive for back pain.   Skin: Negative.    Neurological: Negative.    Psychiatric/Behavioral: Negative.        Objective   Physical Exam   Constitutional: He is oriented to person, place, and time. He appears well-developed and well-nourished.   Neck: Normal range of motion. Neck supple.   Cardiovascular: Normal rate, regular rhythm and normal heart sounds.   Pulmonary/Chest: Effort normal and breath sounds normal.   Musculoskeletal: Normal range of motion. He exhibits no edema, tenderness or deformity.   Neurological: He is alert and oriented to person, place, and time.   Skin: Skin is warm and dry.   Psychiatric: He has a normal mood and affect. His behavior is normal. Judgment and thought content normal.   Nursing note and vitals reviewed.      Procedures    Vitals: Blood pressure 132/88, pulse 84, temperature 97.9 °F (36.6 °C), temperature source Oral, height 180.3 cm (71\"), weight 88.9 kg (196 lb), SpO2 99 %.    Allergies:   Allergies   Allergen Reactions   • Demerol Hcl [Meperidine]    • Levaquin [Levofloxacin]    • Morphine And Related    • Prozac [Fluoxetine Hcl]         During this visit the following were done:  Labs Reviewed []    Labs Ordered []    Radiology Reports Reviewed []    Radiology Ordered []    PCP Records Reviewed []    Referring Provider Records Reviewed []    ER Records Reviewed []    Hospital Records Reviewed []    History Obtained From Family []    Radiology Images Reviewed []    Other Reviewed []    Records Requested " []      Assessment/Plan   Marilee was seen today for back pain.    Diagnoses and all orders for this visit:    Lumbar back sprain, initial encounter   Note written for patient to return to work without restrictions    Hyperlipidemia, unspecified hyperlipidemia type  -     CBC & Differential  -     Comprehensive Metabolic Panel  -     Lipid Panel  -     Magnesium  -     TSH  -     Vitamin B12  -     Vitamin D 25 Hydroxy    Essential hypertension  -     CBC & Differential  -     Comprehensive Metabolic Panel  -     Lipid Panel  -     Magnesium  -     TSH  -     Vitamin B12  -     Vitamin D 25 Hydroxy    Gastroesophageal reflux disease, esophagitis presence not specified  -     CBC & Differential  -     Comprehensive Metabolic Panel  -     Lipid Panel  -     Magnesium  -     TSH  -     Vitamin B12  -     Vitamin D 25 Hydroxy    Vitamin D deficiency  -     CBC & Differential  -     Comprehensive Metabolic Panel  -     Lipid Panel  -     Magnesium  -     TSH  -     Vitamin B12  -     Vitamin D 25 Hydroxy    Cobalamin deficiency  -     CBC & Differential  -     Comprehensive Metabolic Panel  -     Lipid Panel  -     Magnesium  -     TSH  -     Vitamin B12  -     Vitamin D 25 Hydroxy

## 2019-03-20 ENCOUNTER — TELEPHONE (OUTPATIENT)
Dept: FAMILY MEDICINE CLINIC | Facility: CLINIC | Age: 54
End: 2019-03-20

## 2019-03-20 RX ORDER — ERGOCALCIFEROL 1.25 MG/1
50000 CAPSULE ORAL WEEKLY
Qty: 4 CAPSULE | Refills: 2 | Status: SHIPPED | OUTPATIENT
Start: 2019-03-20 | End: 2019-11-21

## 2019-03-20 NOTE — PROGRESS NOTES
Vit D is low at 16.2.  Script for weekly Vit D sent to pharmacy.   Lipids have improved.   Please let him know.

## 2019-03-20 NOTE — TELEPHONE ENCOUNTER
----- Message from BJORN Vincent sent at 3/20/2019 10:55 AM EDT -----  Vit D is low at 16.2.  Script for weekly Vit D sent to pharmacy.   Lipids have improved.   Please let him know.        Left a message to return call.      Patient returned call & verbalized understanding.

## 2019-05-02 RX ORDER — PAROXETINE 10 MG/1
5 TABLET, FILM COATED ORAL EVERY MORNING
Qty: 30 TABLET | Refills: 0 | Status: SHIPPED | OUTPATIENT
Start: 2019-05-02 | End: 2019-05-09 | Stop reason: SDUPTHER

## 2019-05-02 NOTE — TELEPHONE ENCOUNTER
Peyton's patient that has an appt with her on 05/09 but he has been out of paxil for 3 days and starting to feel bad. Peyton is out today can you cover for her. Thank you

## 2019-05-09 ENCOUNTER — OFFICE VISIT (OUTPATIENT)
Dept: PSYCHIATRY | Facility: CLINIC | Age: 54
End: 2019-05-09

## 2019-05-09 VITALS
DIASTOLIC BLOOD PRESSURE: 98 MMHG | BODY MASS INDEX: 26.26 KG/M2 | WEIGHT: 187.6 LBS | HEIGHT: 71 IN | HEART RATE: 77 BPM | SYSTOLIC BLOOD PRESSURE: 138 MMHG

## 2019-05-09 DIAGNOSIS — F60.9 PERSONALITY DISORDER (HCC): ICD-10-CM

## 2019-05-09 DIAGNOSIS — F33.1 MAJOR DEPRESSIVE DISORDER, RECURRENT EPISODE, MODERATE (HCC): Primary | ICD-10-CM

## 2019-05-09 DIAGNOSIS — F43.10 POST TRAUMATIC STRESS DISORDER (PTSD): ICD-10-CM

## 2019-05-09 DIAGNOSIS — F41.1 GENERALIZED ANXIETY DISORDER: ICD-10-CM

## 2019-05-09 PROCEDURE — 99214 OFFICE O/P EST MOD 30 MIN: CPT | Performed by: NURSE PRACTITIONER

## 2019-05-09 RX ORDER — PAROXETINE 10 MG/1
5 TABLET, FILM COATED ORAL EVERY MORNING
Qty: 30 TABLET | Refills: 2 | Status: SHIPPED | OUTPATIENT
Start: 2019-05-09 | End: 2019-07-11 | Stop reason: SDUPTHER

## 2019-05-09 RX ORDER — MIRTAZAPINE 15 MG/1
15 TABLET, FILM COATED ORAL NIGHTLY
Qty: 30 TABLET | Refills: 2 | Status: SHIPPED | OUTPATIENT
Start: 2019-05-09 | End: 2019-07-11 | Stop reason: SDUPTHER
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
## 2019-05-28 ENCOUNTER — APPOINTMENT (OUTPATIENT)
Dept: GENERAL RADIOLOGY | Facility: HOSPITAL | Age: 54
End: 2019-05-28

## 2019-05-28 ENCOUNTER — HOSPITAL ENCOUNTER (EMERGENCY)
Facility: HOSPITAL | Age: 54
Discharge: HOME | End: 2019-05-28
Attending: EMERGENCY MEDICINE | Admitting: EMERGENCY MEDICINE

## 2019-05-28 VITALS
WEIGHT: 186 LBS | OXYGEN SATURATION: 96 % | TEMPERATURE: 98.4 F | SYSTOLIC BLOOD PRESSURE: 131 MMHG | BODY MASS INDEX: 26.04 KG/M2 | HEART RATE: 94 BPM | DIASTOLIC BLOOD PRESSURE: 93 MMHG | RESPIRATION RATE: 20 BRPM | HEIGHT: 71 IN

## 2019-05-28 DIAGNOSIS — S86.919A STRAIN OF KNEE, UNSPECIFIED LATERALITY, INITIAL ENCOUNTER: Primary | ICD-10-CM

## 2019-05-28 PROCEDURE — 99283 EMERGENCY DEPT VISIT LOW MDM: CPT

## 2019-05-28 PROCEDURE — 73562 X-RAY EXAM OF KNEE 3: CPT | Performed by: RADIOLOGY

## 2019-05-28 PROCEDURE — 73552 X-RAY EXAM OF FEMUR 2/>: CPT | Performed by: RADIOLOGY

## 2019-05-28 PROCEDURE — 73552 X-RAY EXAM OF FEMUR 2/>: CPT

## 2019-05-28 PROCEDURE — 73562 X-RAY EXAM OF KNEE 3: CPT

## 2019-05-28 RX ORDER — KETOROLAC TROMETHAMINE 10 MG/1
10 TABLET, FILM COATED ORAL EVERY 6 HOURS PRN
Qty: 12 TABLET | Refills: 0 | Status: SHIPPED | OUTPATIENT
Start: 2019-05-28 | End: 2019-10-24

## 2019-07-11 ENCOUNTER — OFFICE VISIT (OUTPATIENT)
Dept: PSYCHIATRY | Facility: CLINIC | Age: 54
End: 2019-07-11

## 2019-07-11 VITALS
WEIGHT: 192.8 LBS | SYSTOLIC BLOOD PRESSURE: 147 MMHG | BODY MASS INDEX: 26.99 KG/M2 | DIASTOLIC BLOOD PRESSURE: 99 MMHG | HEART RATE: 76 BPM | HEIGHT: 71 IN

## 2019-07-11 DIAGNOSIS — F60.9 PERSONALITY DISORDER (HCC): ICD-10-CM

## 2019-07-11 DIAGNOSIS — F33.1 MAJOR DEPRESSIVE DISORDER, RECURRENT EPISODE, MODERATE (HCC): Primary | ICD-10-CM

## 2019-07-11 DIAGNOSIS — F41.1 GENERALIZED ANXIETY DISORDER: ICD-10-CM

## 2019-07-11 DIAGNOSIS — F43.10 POST TRAUMATIC STRESS DISORDER (PTSD): ICD-10-CM

## 2019-07-11 PROCEDURE — 99214 OFFICE O/P EST MOD 30 MIN: CPT | Performed by: NURSE PRACTITIONER

## 2019-07-11 RX ORDER — MIRTAZAPINE 15 MG/1
15 TABLET, FILM COATED ORAL NIGHTLY
Qty: 30 TABLET | Refills: 2 | Status: SHIPPED | OUTPATIENT
Start: 2019-07-11 | End: 2019-11-21 | Stop reason: SDUPTHER

## 2019-07-11 RX ORDER — SILDENAFIL 25 MG/1
25 TABLET, FILM COATED ORAL DAILY PRN
Qty: 10 TABLET | Refills: 0 | Status: SHIPPED | OUTPATIENT
Start: 2019-07-11 | End: 2019-10-24 | Stop reason: SDUPTHER

## 2019-07-11 RX ORDER — PAROXETINE 10 MG/1
5 TABLET, FILM COATED ORAL EVERY MORNING
Qty: 30 TABLET | Refills: 2 | Status: SHIPPED | OUTPATIENT
Start: 2019-07-11 | End: 2019-10-24 | Stop reason: SDUPTHER

## 2019-07-11 RX ORDER — LURASIDONE HYDROCHLORIDE 20 MG/1
20 TABLET, FILM COATED ORAL DAILY
Qty: 30 TABLET | Refills: 2 | Status: SHIPPED | OUTPATIENT
Start: 2019-07-11 | End: 2019-11-21
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
## 2019-09-06 ENCOUNTER — OFFICE VISIT (OUTPATIENT)
Dept: FAMILY MEDICINE CLINIC | Facility: CLINIC | Age: 54
End: 2019-09-06

## 2019-09-06 VITALS
TEMPERATURE: 98.3 F | BODY MASS INDEX: 27.3 KG/M2 | SYSTOLIC BLOOD PRESSURE: 130 MMHG | HEART RATE: 96 BPM | DIASTOLIC BLOOD PRESSURE: 88 MMHG | HEIGHT: 71 IN | OXYGEN SATURATION: 99 % | WEIGHT: 195 LBS

## 2019-09-06 DIAGNOSIS — Z51.89 VISIT FOR WOUND CHECK: Primary | ICD-10-CM

## 2019-09-06 PROCEDURE — 99213 OFFICE O/P EST LOW 20 MIN: CPT | Performed by: NURSE PRACTITIONER

## 2019-09-06 NOTE — PROGRESS NOTES
Subjective   Marilee Taylor is a 54 y.o. male.     Chief Complaint: Suture / Staple Removal    Suture / Staple Removal   The sutures were placed 7 to 10 days ago. He tried nothing since the wound repair. There has been no drainage from the wound. There is no redness present. There is no swelling present. There is no pain present. He has no difficulty moving the affected extremity or digit.   Wound Check   He was originally treated 5 to 10 days ago. Previous treatment included laceration repair. There has been no drainage from the wound. There is no redness present. There is no swelling present. There is no pain present.      Pt was working with  at home and the  hit his left index finger causing laceration.  He went to [facility] and had 3 stitches to finger.  It has been well.  Healed well.   Needing stitches removed today.     Family History   Problem Relation Age of Onset   • Diabetes Mother    • Hypertension Mother    • Stroke Mother    • Heart disease Mother    • Rheum arthritis Father    • Heart disease Other    • Hypertension Other        Social History     Socioeconomic History   • Marital status: Single     Spouse name: Not on file   • Number of children: Not on file   • Years of education: Not on file   • Highest education level: Not on file   Tobacco Use   • Smoking status: Former Smoker   • Smokeless tobacco: Never Used   Substance and Sexual Activity   • Alcohol use: No     Comment: stopped drinking alcohol   • Drug use: No   • Sexual activity: Defer       Past Medical History:   Diagnosis Date   • Anxiety    • Bipolar disorder (CMS/HCC)    • Depression    • Hyperlipidemia    • Hypertension    • PTSD (post-traumatic stress disorder)    • Vitamin B12 deficiency        Review of Systems   Constitutional: Negative.    HENT: Negative.    Respiratory: Negative.    Cardiovascular: Negative.    Gastrointestinal: Negative.    Musculoskeletal: Negative.    Skin: Positive for wound.  "  Neurological: Negative.    Psychiatric/Behavioral: Negative.        Objective   Physical Exam   Constitutional: He is oriented to person, place, and time. He appears well-developed and well-nourished.   Neck: Normal range of motion. Neck supple.   Cardiovascular: Normal rate, regular rhythm and normal heart sounds.   Pulmonary/Chest: Effort normal and breath sounds normal.   Musculoskeletal: Normal range of motion.        Left hand: He exhibits laceration. He exhibits normal range of motion, no tenderness, normal capillary refill and no swelling. Normal sensation noted. Normal strength noted.        Hands:  Neurological: He is alert and oriented to person, place, and time.   Skin: Skin is warm and dry. Capillary refill takes less than 2 seconds.   3 sutures to dorsal left index finger; approximated well; no drainage, swelling or redness noted   Psychiatric: He has a normal mood and affect. His behavior is normal. Judgment and thought content normal.   Nursing note and vitals reviewed.      Suture Removal  Date/Time: 9/6/2019 3:01 PM  Performed by: Claudette Randolph APRN  Authorized by: Claudette Randolph APRN   Consent: Verbal consent obtained.  Risks and benefits: risks, benefits and alternatives were discussed  Consent given by: patient  Patient understanding: patient states understanding of the procedure being performed  Patient identity confirmed: verbally with patient  Body area: upper extremity  Location details: left index finger  Wound Appearance: clean and warm  Sutures Removed: 3  Post-removal: Steri-Strips applied  Patient tolerance: Patient tolerated the procedure well with no immediate complications          Vitals: Blood pressure 130/88, pulse 96, temperature 98.3 °F (36.8 °C), temperature source Oral, height 180.3 cm (71\"), weight 88.5 kg (195 lb), SpO2 99 %.    Allergies:   Allergies   Allergen Reactions   • Demerol Hcl [Meperidine]    • Levaquin [Levofloxacin]    • Morphine And Related  "   • Prozac [Fluoxetine Hcl]         During this visit the following were done:  Labs Reviewed []    Labs Ordered []    Radiology Reports Reviewed []    Radiology Ordered []    PCP Records Reviewed []    Referring Provider Records Reviewed []    ER Records Reviewed []    Hospital Records Reviewed []    History Obtained From Family []    Radiology Images Reviewed []    Other Reviewed []    Records Requested []      Assessment/Plan   Marilee was seen today for suture / staple removal.    Diagnoses and all orders for this visit:    Visit for wound check    Other orders  -     Suture Removal

## 2019-10-24 ENCOUNTER — OFFICE VISIT (OUTPATIENT)
Dept: FAMILY MEDICINE CLINIC | Facility: CLINIC | Age: 54
End: 2019-10-24

## 2019-10-24 VITALS
HEIGHT: 71 IN | TEMPERATURE: 98.5 F | WEIGHT: 192 LBS | OXYGEN SATURATION: 98 % | BODY MASS INDEX: 26.88 KG/M2 | HEART RATE: 88 BPM | DIASTOLIC BLOOD PRESSURE: 90 MMHG | SYSTOLIC BLOOD PRESSURE: 140 MMHG

## 2019-10-24 DIAGNOSIS — K21.9 GASTROESOPHAGEAL REFLUX DISEASE, ESOPHAGITIS PRESENCE NOT SPECIFIED: ICD-10-CM

## 2019-10-24 DIAGNOSIS — R53.83 OTHER FATIGUE: ICD-10-CM

## 2019-10-24 DIAGNOSIS — F32.A DEPRESSION, UNSPECIFIED DEPRESSION TYPE: ICD-10-CM

## 2019-10-24 DIAGNOSIS — R59.1 LYMPHADENOPATHY: ICD-10-CM

## 2019-10-24 DIAGNOSIS — I10 ESSENTIAL HYPERTENSION: ICD-10-CM

## 2019-10-24 DIAGNOSIS — E55.9 VITAMIN D DEFICIENCY: ICD-10-CM

## 2019-10-24 DIAGNOSIS — E34.9 HYPOTESTOSTERONISM: ICD-10-CM

## 2019-10-24 DIAGNOSIS — E78.2 MIXED HYPERLIPIDEMIA: Primary | ICD-10-CM

## 2019-10-24 DIAGNOSIS — N52.9 ERECTILE DYSFUNCTION, UNSPECIFIED ERECTILE DYSFUNCTION TYPE: ICD-10-CM

## 2019-10-24 DIAGNOSIS — Z12.5 SCREENING PSA (PROSTATE SPECIFIC ANTIGEN): ICD-10-CM

## 2019-10-24 DIAGNOSIS — E53.8 COBALAMIN DEFICIENCY: ICD-10-CM

## 2019-10-24 PROCEDURE — G0008 ADMIN INFLUENZA VIRUS VAC: HCPCS | Performed by: NURSE PRACTITIONER

## 2019-10-24 PROCEDURE — 99214 OFFICE O/P EST MOD 30 MIN: CPT | Performed by: NURSE PRACTITIONER

## 2019-10-24 PROCEDURE — 90674 CCIIV4 VAC NO PRSV 0.5 ML IM: CPT | Performed by: NURSE PRACTITIONER

## 2019-10-24 RX ORDER — SILDENAFIL 25 MG/1
25 TABLET, FILM COATED ORAL DAILY PRN
Qty: 10 TABLET | Refills: 0 | Status: CANCELLED | OUTPATIENT
Start: 2019-10-24

## 2019-10-24 RX ORDER — SILDENAFIL 25 MG/1
25 TABLET, FILM COATED ORAL DAILY PRN
Qty: 30 TABLET | Refills: 0 | Status: SHIPPED | OUTPATIENT
Start: 2019-10-24 | End: 2020-02-05

## 2019-10-24 RX ORDER — LOSARTAN POTASSIUM 50 MG/1
75 TABLET ORAL DAILY
Qty: 45 TABLET | Refills: 5 | Status: SHIPPED | OUTPATIENT
Start: 2019-10-24 | End: 2021-02-04 | Stop reason: SDUPTHER

## 2019-10-24 RX ORDER — AMLODIPINE BESYLATE AND BENAZEPRIL HYDROCHLORIDE 5; 20 MG/1; MG/1
1 CAPSULE ORAL DAILY
Qty: 90 CAPSULE | Refills: 1 | Status: SHIPPED | OUTPATIENT
Start: 2019-10-24 | End: 2021-02-04 | Stop reason: SDUPTHER

## 2019-10-24 RX ORDER — PAROXETINE 10 MG/1
10 TABLET, FILM COATED ORAL EVERY MORNING
Qty: 90 TABLET | Refills: 1 | Status: SHIPPED | OUTPATIENT
Start: 2019-10-24 | End: 2020-07-17 | Stop reason: SDUPTHER

## 2019-10-24 RX ORDER — OMEPRAZOLE 20 MG/1
20 CAPSULE, DELAYED RELEASE ORAL DAILY
Qty: 90 CAPSULE | Refills: 1 | Status: SHIPPED | OUTPATIENT
Start: 2019-10-24 | End: 2021-02-04 | Stop reason: SDUPTHER

## 2019-10-24 RX ORDER — AZITHROMYCIN 250 MG/1
TABLET, FILM COATED ORAL
Qty: 6 TABLET | Refills: 0 | Status: SHIPPED | OUTPATIENT
Start: 2019-10-24 | End: 2019-11-21

## 2019-10-24 NOTE — PROGRESS NOTES
Subjective   Marilee Taylor is a 54 y.o. male.     Chief Complaint: Fatigue and Insect Bite (sting, left jaw line )    Fatigue   This is a chronic problem. The current episode started more than 1 month ago. The problem occurs daily. The problem has been unchanged. Associated symptoms include fatigue. Pertinent negatives include no headaches. Nothing aggravates the symptoms. He has tried nothing for the symptoms.   Insect Bite   This is a new (pt had two hornet stings to left side of his jaw) problem. The current episode started 1 to 4 weeks ago. The problem occurs constantly. The problem has been gradually improving. Associated symptoms include fatigue. Pertinent negatives include no headaches. Associated symptoms comments: Swelling in neck and lymph nodes enlarged and tender. Nothing aggravates the symptoms. He has tried nothing for the symptoms.   Hypertension   This is a chronic problem. The current episode started more than 1 year ago. The problem is controlled. Pertinent negatives include no headaches, palpitations, peripheral edema or shortness of breath. Current antihypertension treatment includes calcium channel blockers. The current treatment provides significant improvement. Compliance problems include exercise and diet.    Heartburn   He complains of heartburn. This is a chronic problem. The current episode started more than 1 year ago. The problem occurs frequently. The problem has been waxing and waning. The heartburn does not wake him from sleep. The heartburn does not limit his activity. The heartburn doesn't change with position. The symptoms are aggravated by certain foods. Associated symptoms include fatigue. He has tried a PPI for the symptoms. The treatment provided significant relief.   Hyperlipidemia   This is a chronic problem. The current episode started more than 1 year ago. Factors aggravating his hyperlipidemia include fatty foods. Pertinent negatives include no shortness of breath. Current  antihyperlipidemic treatment includes statins. The current treatment provides significant improvement of lipids. Compliance problems include adherence to exercise and adherence to diet.    Depression   Visit Type: follow-up (symptoms are well controlled at this time)  Patient presents with the following symptoms: depressed mood.  Patient is not experiencing: palpitations, shortness of breath, suicidal ideas, suicidal planning and thoughts of death.  Frequency of symptoms: most days   Severity: moderate   Sleep quality: fair  Nighttime awakenings: occasional  Compliance with medications:  %  Treatment side effects: none.  Erectile Dysfunction   This is a chronic (hypotestosteronism) problem. The current episode started more than 1 year ago. The problem has been waxing and waning since onset. The nature of his difficulty is achieving erection and maintaining erection. Non-physiologic factors contributing to erectile dysfunction are a decreased libido. Irritative symptoms do not include frequency, nocturia or urgency. Obstructive symptoms do not include dribbling, incomplete emptying, straining or a weak stream. The symptoms are aggravated by medications. Past treatments include sildenafil. The treatment provided significant relief. He has been using treatment for 6 to 12 months. He has had no adverse reactions caused by medications.        Family History   Problem Relation Age of Onset   • Diabetes Mother    • Hypertension Mother    • Stroke Mother    • Heart disease Mother    • Rheum arthritis Father    • Heart disease Other    • Hypertension Other        Social History     Socioeconomic History   • Marital status: Single     Spouse name: Not on file   • Number of children: Not on file   • Years of education: Not on file   • Highest education level: Not on file   Tobacco Use   • Smoking status: Former Smoker   • Smokeless tobacco: Never Used   Substance and Sexual Activity   • Alcohol use: No     Comment:  "stopped drinking alcohol   • Drug use: No   • Sexual activity: Defer       Past Medical History:   Diagnosis Date   • Anxiety    • Bipolar disorder (CMS/HCC)    • Depression    • ED (erectile dysfunction)    • Hyperlipidemia    • Hypertension    • PTSD (post-traumatic stress disorder)    • Vitamin B12 deficiency        Review of Systems   Constitutional: Positive for fatigue.   Respiratory: Negative for shortness of breath.    Cardiovascular: Negative for palpitations.   Gastrointestinal: Positive for heartburn.   Genitourinary: Positive for decreased libido. Negative for frequency, incomplete emptying, nocturia and urgency.   Neurological: Negative for headaches.   Psychiatric/Behavioral: Negative for suicidal ideas.       Objective   Physical Exam   Constitutional: He is oriented to person, place, and time. He appears well-developed and well-nourished.   Eyes: Conjunctivae are normal.   Neck: Normal range of motion. Neck supple.   Anteriorcervical left side palpable and tender to touch   Cardiovascular: Normal rate, regular rhythm and normal heart sounds.   Pulmonary/Chest: Effort normal and breath sounds normal.   Neurological: He is alert and oriented to person, place, and time.   Skin: Skin is warm and dry.   Psychiatric: He has a normal mood and affect. His behavior is normal. Judgment and thought content normal.   Nursing note and vitals reviewed.      Procedures    Vitals: Blood pressure 140/90, pulse 88, temperature 98.5 °F (36.9 °C), temperature source Oral, height 180.3 cm (71\"), weight 87.1 kg (192 lb), SpO2 98 %.    Allergies:   Allergies   Allergen Reactions   • Demerol Hcl [Meperidine]    • Levaquin [Levofloxacin]    • Morphine And Related    • Prozac [Fluoxetine Hcl]         During this visit the following were done:  Labs Reviewed []    Labs Ordered []    Radiology Reports Reviewed []    Radiology Ordered []    PCP Records Reviewed []    Referring Provider Records Reviewed []    ER Records Reviewed " []    Hospital Records Reviewed []    History Obtained From Family []    Radiology Images Reviewed []    Other Reviewed []    Records Requested []      Assessment/Plan   Marilee was seen today for fatigue and insect bite.    Diagnoses and all orders for this visit:    Mixed hyperlipidemia  -     CBC & Differential; Future  -     Comprehensive Metabolic Panel; Future  -     Lipid Panel; Future  -     Magnesium; Future  -     TSH; Future  -     Vitamin B12; Future  -     Vitamin D 25 Hydroxy; Future  -     T4, Free; Future  -     Testosterone, Free, Total; Future    Gastroesophageal reflux disease, esophagitis presence not specified  -     omeprazole (priLOSEC) 20 MG capsule; Take 1 capsule by mouth Daily. For:  Esophageal reflux.  -     CBC & Differential; Future  -     Comprehensive Metabolic Panel; Future  -     Lipid Panel; Future  -     Magnesium; Future  -     TSH; Future  -     Vitamin B12; Future  -     Vitamin D 25 Hydroxy; Future  -     T4, Free; Future  -     Testosterone, Free, Total; Future    Essential hypertension  -     losartan (COZAAR) 50 MG tablet; Take 1.5 tablets by mouth Daily. For:  Hypertension.  -     amLODIPine-benazepril (LOTREL 5-20) 5-20 MG per capsule; Take 1 capsule by mouth Daily.  -     CBC & Differential; Future  -     Comprehensive Metabolic Panel; Future  -     Lipid Panel; Future  -     Magnesium; Future  -     TSH; Future  -     Vitamin B12; Future  -     Vitamin D 25 Hydroxy; Future  -     T4, Free; Future  -     Testosterone, Free, Total; Future    Lymphadenopathy  -     azithromycin (ZITHROMAX Z-DOMENICA) 250 MG tablet; Take 2 tablets the first day, then 1 tablet daily for 4 days.    Vitamin D deficiency  -     CBC & Differential; Future  -     Comprehensive Metabolic Panel; Future  -     Lipid Panel; Future  -     Magnesium; Future  -     TSH; Future  -     Vitamin B12; Future  -     Vitamin D 25 Hydroxy; Future  -     T4, Free; Future  -     Testosterone, Free, Total;  Future    Cobalamin deficiency  -     CBC & Differential; Future  -     Comprehensive Metabolic Panel; Future  -     Lipid Panel; Future  -     Magnesium; Future  -     TSH; Future  -     Vitamin B12; Future  -     Vitamin D 25 Hydroxy; Future  -     T4, Free; Future  -     Testosterone, Free, Total; Future    Hypotestosteronism  -     CBC & Differential; Future  -     Comprehensive Metabolic Panel; Future  -     Lipid Panel; Future  -     Magnesium; Future  -     TSH; Future  -     Vitamin B12; Future  -     Vitamin D 25 Hydroxy; Future  -     T4, Free; Future  -     Testosterone, Free, Total; Future    Other fatigue  -     CBC & Differential; Future  -     Comprehensive Metabolic Panel; Future  -     Lipid Panel; Future  -     Magnesium; Future  -     TSH; Future  -     Vitamin B12; Future  -     Vitamin D 25 Hydroxy; Future  -     T4, Free; Future  -     Testosterone, Free, Total; Future    Erectile dysfunction, unspecified erectile dysfunction type  -     sildenafil (VIAGRA) 25 MG tablet; Take 1 tablet by mouth Daily As Needed for erectile dysfunction.  -     CBC & Differential; Future  -     Comprehensive Metabolic Panel; Future  -     Lipid Panel; Future  -     Magnesium; Future  -     TSH; Future  -     Vitamin B12; Future  -     Vitamin D 25 Hydroxy; Future  -     T4, Free; Future  -     Testosterone, Free, Total; Future    Depression, unspecified depression type  -     PARoxetine (PAXIL) 10 MG tablet; Take 1 tablet by mouth Every Morning.  -     CBC & Differential; Future  -     Comprehensive Metabolic Panel; Future  -     Lipid Panel; Future  -     Magnesium; Future  -     TSH; Future  -     Vitamin B12; Future  -     Vitamin D 25 Hydroxy; Future  -     T4, Free; Future  -     Testosterone, Free, Total; Future    Screening PSA (prostate specific antigen)  -     PSA Screen; Future

## 2019-10-25 ENCOUNTER — LAB (OUTPATIENT)
Dept: FAMILY MEDICINE CLINIC | Facility: CLINIC | Age: 54
End: 2019-10-25

## 2019-10-25 DIAGNOSIS — N52.9 ERECTILE DYSFUNCTION, UNSPECIFIED ERECTILE DYSFUNCTION TYPE: ICD-10-CM

## 2019-10-25 DIAGNOSIS — I10 ESSENTIAL HYPERTENSION: ICD-10-CM

## 2019-10-25 DIAGNOSIS — K21.9 GASTROESOPHAGEAL REFLUX DISEASE, ESOPHAGITIS PRESENCE NOT SPECIFIED: ICD-10-CM

## 2019-10-25 DIAGNOSIS — E78.2 MIXED HYPERLIPIDEMIA: ICD-10-CM

## 2019-10-25 DIAGNOSIS — R53.83 OTHER FATIGUE: ICD-10-CM

## 2019-10-25 DIAGNOSIS — E34.9 HYPOTESTOSTERONISM: ICD-10-CM

## 2019-10-25 DIAGNOSIS — Z12.5 SCREENING PSA (PROSTATE SPECIFIC ANTIGEN): ICD-10-CM

## 2019-10-25 DIAGNOSIS — E53.8 COBALAMIN DEFICIENCY: ICD-10-CM

## 2019-10-25 DIAGNOSIS — F32.A DEPRESSION, UNSPECIFIED DEPRESSION TYPE: ICD-10-CM

## 2019-10-25 DIAGNOSIS — E55.9 VITAMIN D DEFICIENCY: ICD-10-CM

## 2019-10-25 PROCEDURE — 84439 ASSAY OF FREE THYROXINE: CPT | Performed by: NURSE PRACTITIONER

## 2019-10-25 PROCEDURE — G0103 PSA SCREENING: HCPCS | Performed by: NURSE PRACTITIONER

## 2019-10-25 PROCEDURE — 85025 COMPLETE CBC W/AUTO DIFF WBC: CPT | Performed by: NURSE PRACTITIONER

## 2019-10-25 PROCEDURE — 84403 ASSAY OF TOTAL TESTOSTERONE: CPT | Performed by: NURSE PRACTITIONER

## 2019-10-25 PROCEDURE — 82607 VITAMIN B-12: CPT | Performed by: NURSE PRACTITIONER

## 2019-10-25 PROCEDURE — 82306 VITAMIN D 25 HYDROXY: CPT | Performed by: NURSE PRACTITIONER

## 2019-10-25 PROCEDURE — 84402 ASSAY OF FREE TESTOSTERONE: CPT | Performed by: NURSE PRACTITIONER

## 2019-10-25 PROCEDURE — 84443 ASSAY THYROID STIM HORMONE: CPT | Performed by: NURSE PRACTITIONER

## 2019-10-25 PROCEDURE — 80053 COMPREHEN METABOLIC PANEL: CPT | Performed by: NURSE PRACTITIONER

## 2019-10-25 PROCEDURE — 80061 LIPID PANEL: CPT | Performed by: NURSE PRACTITIONER

## 2019-10-25 PROCEDURE — 83735 ASSAY OF MAGNESIUM: CPT | Performed by: NURSE PRACTITIONER

## 2019-10-26 LAB
25(OH)D3 SERPL-MCNC: 19 NG/ML (ref 30–100)
ALBUMIN SERPL-MCNC: 4.1 G/DL (ref 3.5–5.2)
ALBUMIN/GLOB SERPL: 1.3 G/DL
ALP SERPL-CCNC: 74 U/L (ref 39–117)
ALT SERPL W P-5'-P-CCNC: 32 U/L (ref 1–41)
ANION GAP SERPL CALCULATED.3IONS-SCNC: 12.2 MMOL/L (ref 5–15)
AST SERPL-CCNC: 22 U/L (ref 1–40)
BASOPHILS # BLD AUTO: 0.06 10*3/MM3 (ref 0–0.2)
BASOPHILS NFR BLD AUTO: 0.8 % (ref 0–1.5)
BILIRUB SERPL-MCNC: 0.3 MG/DL (ref 0.2–1.2)
BUN BLD-MCNC: 11 MG/DL (ref 6–20)
BUN/CREAT SERPL: 11.5 (ref 7–25)
CALCIUM SPEC-SCNC: 8.8 MG/DL (ref 8.6–10.5)
CHLORIDE SERPL-SCNC: 105 MMOL/L (ref 98–107)
CHOLEST SERPL-MCNC: 205 MG/DL (ref 0–200)
CO2 SERPL-SCNC: 26.8 MMOL/L (ref 22–29)
CREAT BLD-MCNC: 0.96 MG/DL (ref 0.76–1.27)
DEPRECATED RDW RBC AUTO: 40.4 FL (ref 37–54)
EOSINOPHIL # BLD AUTO: 0.3 10*3/MM3 (ref 0–0.4)
EOSINOPHIL NFR BLD AUTO: 4 % (ref 0.3–6.2)
ERYTHROCYTE [DISTWIDTH] IN BLOOD BY AUTOMATED COUNT: 13 % (ref 12.3–15.4)
GFR SERPL CREATININE-BSD FRML MDRD: 81 ML/MIN/1.73
GLOBULIN UR ELPH-MCNC: 3.1 GM/DL
GLUCOSE BLD-MCNC: 100 MG/DL (ref 65–99)
HCT VFR BLD AUTO: 43.4 % (ref 37.5–51)
HDLC SERPL-MCNC: 29 MG/DL (ref 40–60)
HGB BLD-MCNC: 15.1 G/DL (ref 13–17.7)
IMM GRANULOCYTES # BLD AUTO: 0.04 10*3/MM3 (ref 0–0.05)
IMM GRANULOCYTES NFR BLD AUTO: 0.5 % (ref 0–0.5)
LDLC SERPL CALC-MCNC: 125 MG/DL (ref 0–100)
LDLC/HDLC SERPL: 4.3 {RATIO}
LYMPHOCYTES # BLD AUTO: 1.92 10*3/MM3 (ref 0.7–3.1)
LYMPHOCYTES NFR BLD AUTO: 25.7 % (ref 19.6–45.3)
MAGNESIUM SERPL-MCNC: 2.3 MG/DL (ref 1.6–2.6)
MCH RBC QN AUTO: 29.9 PG (ref 26.6–33)
MCHC RBC AUTO-ENTMCNC: 34.8 G/DL (ref 31.5–35.7)
MCV RBC AUTO: 85.9 FL (ref 79–97)
MONOCYTES # BLD AUTO: 0.64 10*3/MM3 (ref 0.1–0.9)
MONOCYTES NFR BLD AUTO: 8.6 % (ref 5–12)
NEUTROPHILS # BLD AUTO: 4.51 10*3/MM3 (ref 1.7–7)
NEUTROPHILS NFR BLD AUTO: 60.4 % (ref 42.7–76)
NRBC BLD AUTO-RTO: 0 /100 WBC (ref 0–0.2)
PLATELET # BLD AUTO: 238 10*3/MM3 (ref 140–450)
PMV BLD AUTO: 10.7 FL (ref 6–12)
POTASSIUM BLD-SCNC: 3.8 MMOL/L (ref 3.5–5.2)
PROT SERPL-MCNC: 7.2 G/DL (ref 6–8.5)
PSA SERPL-MCNC: 0.86 NG/ML (ref 0–4)
RBC # BLD AUTO: 5.05 10*6/MM3 (ref 4.14–5.8)
SODIUM BLD-SCNC: 144 MMOL/L (ref 136–145)
T4 FREE SERPL-MCNC: 0.97 NG/DL (ref 0.93–1.7)
TRIGL SERPL-MCNC: 256 MG/DL (ref 0–150)
TSH SERPL DL<=0.05 MIU/L-ACNC: 2.11 UIU/ML (ref 0.27–4.2)
VIT B12 BLD-MCNC: 437 PG/ML (ref 211–946)
VLDLC SERPL-MCNC: 51.2 MG/DL (ref 5–40)
WBC NRBC COR # BLD: 7.47 10*3/MM3 (ref 3.4–10.8)

## 2019-10-28 LAB
TESTOST FREE SERPL-MCNC: 6.3 PG/ML (ref 7.2–24)
TESTOST SERPL-MCNC: 252 NG/DL (ref 264–916)

## 2019-10-29 ENCOUNTER — TELEPHONE (OUTPATIENT)
Dept: FAMILY MEDICINE CLINIC | Facility: CLINIC | Age: 54
End: 2019-10-29

## 2019-10-29 RX ORDER — MELATONIN
1000 DAILY
Qty: 30 TABLET | Refills: 5 | Status: SHIPPED | OUTPATIENT
Start: 2019-10-29 | End: 2022-08-08 | Stop reason: SDUPTHER

## 2019-10-29 RX ORDER — ROSUVASTATIN CALCIUM 20 MG/1
20 TABLET, COATED ORAL DAILY
Qty: 30 TABLET | Refills: 5 | Status: SHIPPED | OUTPATIENT
Start: 2019-10-29 | End: 2020-02-05
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
## 2019-10-29 NOTE — PROGRESS NOTES
Patient's testosterone level is low at 252.  If he would like testosterone replacement we can refer him to urology if he would like.His cholesterol and triglycerides are elevated and I do suggest we start him on Crestor 20 mg daily if he is agreeable.His vitamin D is also low.  He has had supplementation in the past.  I would suggest him getting vitamin D 1000 units over-the-counter and take daily.Please let patient know.

## 2019-11-21 ENCOUNTER — OFFICE VISIT (OUTPATIENT)
Dept: FAMILY MEDICINE CLINIC | Facility: CLINIC | Age: 54
End: 2019-11-21

## 2019-11-21 VITALS
TEMPERATURE: 97.8 F | HEIGHT: 71 IN | DIASTOLIC BLOOD PRESSURE: 90 MMHG | SYSTOLIC BLOOD PRESSURE: 138 MMHG | HEART RATE: 72 BPM | OXYGEN SATURATION: 99 % | WEIGHT: 192 LBS | BODY MASS INDEX: 26.88 KG/M2

## 2019-11-21 DIAGNOSIS — I10 ESSENTIAL HYPERTENSION: ICD-10-CM

## 2019-11-21 DIAGNOSIS — K21.9 GASTROESOPHAGEAL REFLUX DISEASE, ESOPHAGITIS PRESENCE NOT SPECIFIED: ICD-10-CM

## 2019-11-21 DIAGNOSIS — T14.8XXA MUSCLE STRAIN: Primary | ICD-10-CM

## 2019-11-21 DIAGNOSIS — F32.A DEPRESSION, UNSPECIFIED DEPRESSION TYPE: ICD-10-CM

## 2019-11-21 PROCEDURE — 99213 OFFICE O/P EST LOW 20 MIN: CPT | Performed by: NURSE PRACTITIONER

## 2019-11-21 PROCEDURE — 96372 THER/PROPH/DIAG INJ SC/IM: CPT | Performed by: NURSE PRACTITIONER

## 2019-11-21 RX ORDER — AMLODIPINE BESYLATE AND BENAZEPRIL HYDROCHLORIDE 5; 20 MG/1; MG/1
1 CAPSULE ORAL DAILY
Qty: 90 CAPSULE | Refills: 1 | Status: CANCELLED | OUTPATIENT
Start: 2019-11-21

## 2019-11-21 RX ORDER — DEXAMETHASONE SODIUM PHOSPHATE 4 MG/ML
8 INJECTION, SOLUTION INTRA-ARTICULAR; INTRALESIONAL; INTRAMUSCULAR; INTRAVENOUS; SOFT TISSUE ONCE
Status: COMPLETED | OUTPATIENT
Start: 2019-11-21 | End: 2019-11-21

## 2019-11-21 RX ORDER — PAROXETINE 10 MG/1
10 TABLET, FILM COATED ORAL EVERY MORNING
Qty: 90 TABLET | Refills: 1 | Status: CANCELLED | OUTPATIENT
Start: 2019-11-21

## 2019-11-21 RX ORDER — ALBUTEROL SULFATE 90 UG/1
1-2 AEROSOL, METERED RESPIRATORY (INHALATION) EVERY 6 HOURS PRN
Qty: 18 G | Refills: 5 | Status: SHIPPED | OUTPATIENT
Start: 2019-11-21 | End: 2022-01-04 | Stop reason: ALTCHOICE

## 2019-11-21 RX ORDER — ROSUVASTATIN CALCIUM 20 MG/1
20 TABLET, COATED ORAL DAILY
Qty: 30 TABLET | Refills: 5 | Status: CANCELLED | OUTPATIENT
Start: 2019-11-21

## 2019-11-21 RX ORDER — OMEPRAZOLE 20 MG/1
20 CAPSULE, DELAYED RELEASE ORAL DAILY
Qty: 90 CAPSULE | Refills: 1 | Status: CANCELLED | OUTPATIENT
Start: 2019-11-21

## 2019-11-21 RX ORDER — MIRTAZAPINE 15 MG/1
15 TABLET, FILM COATED ORAL NIGHTLY
Qty: 30 TABLET | Refills: 2 | Status: SHIPPED | OUTPATIENT
Start: 2019-11-21 | End: 2020-11-20

## 2019-11-21 RX ORDER — CYCLOBENZAPRINE HCL 10 MG
10 TABLET ORAL 2 TIMES DAILY PRN
Qty: 60 TABLET | Refills: 0 | Status: SHIPPED | OUTPATIENT
Start: 2019-11-21 | End: 2020-02-05

## 2019-11-21 RX ORDER — FENOFIBRATE 145 MG/1
145 TABLET, COATED ORAL DAILY
Qty: 30 TABLET | Refills: 5 | Status: SHIPPED | OUTPATIENT
Start: 2019-11-21 | End: 2021-02-04 | Stop reason: SDUPTHER

## 2019-11-21 RX ORDER — SILDENAFIL 25 MG/1
25 TABLET, FILM COATED ORAL DAILY PRN
Qty: 30 TABLET | Refills: 0 | Status: CANCELLED | OUTPATIENT
Start: 2019-11-21

## 2019-11-21 RX ORDER — LOSARTAN POTASSIUM 50 MG/1
75 TABLET ORAL DAILY
Qty: 45 TABLET | Refills: 5 | Status: CANCELLED | OUTPATIENT
Start: 2019-11-21

## 2019-11-21 RX ADMIN — DEXAMETHASONE SODIUM PHOSPHATE 8 MG: 4 INJECTION, SOLUTION INTRA-ARTICULAR; INTRALESIONAL; INTRAMUSCULAR; INTRAVENOUS; SOFT TISSUE at 14:41

## 2020-02-05 ENCOUNTER — OFFICE VISIT (OUTPATIENT)
Dept: FAMILY MEDICINE CLINIC | Facility: CLINIC | Age: 55
End: 2020-02-05

## 2020-02-05 VITALS
WEIGHT: 198 LBS | BODY MASS INDEX: 27.72 KG/M2 | OXYGEN SATURATION: 99 % | HEART RATE: 83 BPM | TEMPERATURE: 97.7 F | SYSTOLIC BLOOD PRESSURE: 144 MMHG | HEIGHT: 71 IN | DIASTOLIC BLOOD PRESSURE: 88 MMHG

## 2020-02-05 DIAGNOSIS — E55.9 VITAMIN D DEFICIENCY: ICD-10-CM

## 2020-02-05 DIAGNOSIS — R22.1 MASS OF NECK: ICD-10-CM

## 2020-02-05 DIAGNOSIS — M25.561 CHRONIC PAIN OF RIGHT KNEE: ICD-10-CM

## 2020-02-05 DIAGNOSIS — R51.9 NONINTRACTABLE EPISODIC HEADACHE, UNSPECIFIED HEADACHE TYPE: ICD-10-CM

## 2020-02-05 DIAGNOSIS — G89.29 CHRONIC PAIN OF RIGHT KNEE: ICD-10-CM

## 2020-02-05 DIAGNOSIS — J30.9 ALLERGIC RHINITIS, UNSPECIFIED SEASONALITY, UNSPECIFIED TRIGGER: ICD-10-CM

## 2020-02-05 DIAGNOSIS — R41.3 MEMORY LOSS: ICD-10-CM

## 2020-02-05 DIAGNOSIS — R07.81 RIB PAIN ON LEFT SIDE: Primary | ICD-10-CM

## 2020-02-05 PROCEDURE — 86003 ALLG SPEC IGE CRUDE XTRC EA: CPT | Performed by: NURSE PRACTITIONER

## 2020-02-05 PROCEDURE — 82607 VITAMIN B-12: CPT | Performed by: NURSE PRACTITIONER

## 2020-02-05 PROCEDURE — 83735 ASSAY OF MAGNESIUM: CPT | Performed by: NURSE PRACTITIONER

## 2020-02-05 PROCEDURE — 84443 ASSAY THYROID STIM HORMONE: CPT | Performed by: NURSE PRACTITIONER

## 2020-02-05 PROCEDURE — 99214 OFFICE O/P EST MOD 30 MIN: CPT | Performed by: NURSE PRACTITIONER

## 2020-02-05 PROCEDURE — 85025 COMPLETE CBC W/AUTO DIFF WBC: CPT | Performed by: NURSE PRACTITIONER

## 2020-02-05 PROCEDURE — 80053 COMPREHEN METABOLIC PANEL: CPT | Performed by: NURSE PRACTITIONER

## 2020-02-05 PROCEDURE — 82306 VITAMIN D 25 HYDROXY: CPT | Performed by: NURSE PRACTITIONER
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
## 2020-02-06 LAB
25(OH)D3 SERPL-MCNC: 14.6 NG/ML (ref 30–100)
ALBUMIN SERPL-MCNC: 4.3 G/DL (ref 3.5–5.2)
ALBUMIN/GLOB SERPL: 1.7 G/DL
ALP SERPL-CCNC: 78 U/L (ref 39–117)
ALT SERPL W P-5'-P-CCNC: 37 U/L (ref 1–41)
ANION GAP SERPL CALCULATED.3IONS-SCNC: 10.6 MMOL/L (ref 5–15)
AST SERPL-CCNC: 26 U/L (ref 1–40)
BASOPHILS # BLD AUTO: 0.05 10*3/MM3 (ref 0–0.2)
BASOPHILS NFR BLD AUTO: 0.7 % (ref 0–1.5)
BILIRUB SERPL-MCNC: 0.3 MG/DL (ref 0.2–1.2)
BUN BLD-MCNC: 12 MG/DL (ref 6–20)
BUN/CREAT SERPL: 14.1 (ref 7–25)
CALCIUM SPEC-SCNC: 9.7 MG/DL (ref 8.6–10.5)
CHLORIDE SERPL-SCNC: 101 MMOL/L (ref 98–107)
CO2 SERPL-SCNC: 25.4 MMOL/L (ref 22–29)
CREAT BLD-MCNC: 0.85 MG/DL (ref 0.76–1.27)
DEPRECATED RDW RBC AUTO: 40.5 FL (ref 37–54)
EOSINOPHIL # BLD AUTO: 0.33 10*3/MM3 (ref 0–0.4)
EOSINOPHIL NFR BLD AUTO: 4.4 % (ref 0.3–6.2)
ERYTHROCYTE [DISTWIDTH] IN BLOOD BY AUTOMATED COUNT: 13.1 % (ref 12.3–15.4)
GFR SERPL CREATININE-BSD FRML MDRD: 93 ML/MIN/1.73
GLOBULIN UR ELPH-MCNC: 2.5 GM/DL
GLUCOSE BLD-MCNC: 88 MG/DL (ref 65–99)
HCT VFR BLD AUTO: 44.5 % (ref 37.5–51)
HGB BLD-MCNC: 15.3 G/DL (ref 13–17.7)
IMM GRANULOCYTES # BLD AUTO: 0.06 10*3/MM3 (ref 0–0.05)
IMM GRANULOCYTES NFR BLD AUTO: 0.8 % (ref 0–0.5)
LYMPHOCYTES # BLD AUTO: 2 10*3/MM3 (ref 0.7–3.1)
LYMPHOCYTES NFR BLD AUTO: 26.5 % (ref 19.6–45.3)
MAGNESIUM SERPL-MCNC: 2.3 MG/DL (ref 1.6–2.6)
MCH RBC QN AUTO: 29.1 PG (ref 26.6–33)
MCHC RBC AUTO-ENTMCNC: 34.4 G/DL (ref 31.5–35.7)
MCV RBC AUTO: 84.6 FL (ref 79–97)
MONOCYTES # BLD AUTO: 0.68 10*3/MM3 (ref 0.1–0.9)
MONOCYTES NFR BLD AUTO: 9 % (ref 5–12)
NEUTROPHILS # BLD AUTO: 4.42 10*3/MM3 (ref 1.7–7)
NEUTROPHILS NFR BLD AUTO: 58.6 % (ref 42.7–76)
NRBC BLD AUTO-RTO: 0 /100 WBC (ref 0–0.2)
PLATELET # BLD AUTO: 279 10*3/MM3 (ref 140–450)
PMV BLD AUTO: 10.5 FL (ref 6–12)
POTASSIUM BLD-SCNC: 3.8 MMOL/L (ref 3.5–5.2)
PROT SERPL-MCNC: 6.8 G/DL (ref 6–8.5)
RBC # BLD AUTO: 5.26 10*6/MM3 (ref 4.14–5.8)
SODIUM BLD-SCNC: 137 MMOL/L (ref 136–145)
TSH SERPL DL<=0.05 MIU/L-ACNC: 1.69 UIU/ML (ref 0.27–4.2)
VIT B12 BLD-MCNC: 416 PG/ML (ref 211–946)
WBC NRBC COR # BLD: 7.54 10*3/MM3 (ref 3.4–10.8)

## 2020-02-07 ENCOUNTER — TELEPHONE (OUTPATIENT)
Dept: FAMILY MEDICINE CLINIC | Facility: CLINIC | Age: 55
End: 2020-02-07

## 2020-02-07 RX ORDER — ERGOCALCIFEROL 1.25 MG/1
50000 CAPSULE ORAL WEEKLY
Qty: 12 CAPSULE | Refills: 0 | Status: SHIPPED | OUTPATIENT
Start: 2020-02-07 | End: 2021-02-04

## 2020-02-07 NOTE — TELEPHONE ENCOUNTER
----- Message from BJORN Vincent sent at 2/7/2020 10:45 AM EST -----  His Vit D is low and I have sent a script for weekly vit D to his pharmacy.  Please let him know.      Zoie notified & verbalized understanding.

## 2020-02-09 LAB — DEPRECATED STACHYBOTRYS CHARTARUM IGE AB RAST CLASS [PRESENCE] IN SERUM: <0.1 KU/L

## 2020-03-12 ENCOUNTER — OFFICE VISIT (OUTPATIENT)
Dept: FAMILY MEDICINE CLINIC | Facility: CLINIC | Age: 55
End: 2020-03-12

## 2020-03-12 ENCOUNTER — HOSPITAL ENCOUNTER (OUTPATIENT)
Dept: CT IMAGING | Facility: HOSPITAL | Age: 55
Discharge: HOME | End: 2020-03-12
Admitting: NURSE PRACTITIONER

## 2020-03-12 VITALS
HEART RATE: 75 BPM | WEIGHT: 196 LBS | SYSTOLIC BLOOD PRESSURE: 146 MMHG | DIASTOLIC BLOOD PRESSURE: 100 MMHG | TEMPERATURE: 98.4 F | BODY MASS INDEX: 27.44 KG/M2 | OXYGEN SATURATION: 98 % | HEIGHT: 71 IN

## 2020-03-12 DIAGNOSIS — R42 DIZZINESS: Primary | ICD-10-CM

## 2020-03-12 DIAGNOSIS — R42 DIZZINESS: ICD-10-CM

## 2020-03-12 DIAGNOSIS — G44.319 ACUTE POST-TRAUMATIC HEADACHE, NOT INTRACTABLE: ICD-10-CM

## 2020-03-12 PROCEDURE — 99213 OFFICE O/P EST LOW 20 MIN: CPT | Performed by: NURSE PRACTITIONER

## 2020-03-12 PROCEDURE — 70450 CT HEAD/BRAIN W/O DYE: CPT

## 2020-03-12 PROCEDURE — 70450 CT HEAD/BRAIN W/O DYE: CPT | Performed by: RADIOLOGY

## 2020-03-12 NOTE — PROGRESS NOTES
"Subjective   Marilee Taylor is a 55 y.o. male.     Chief Complaint: Dizziness    History of Present Illness   Pt is here today with complaints of dizziness.  He states that one week ago he picked up a heavy box and twisted around and started having moderate to severe pain in his lower back.  He does have a hx of low back pain.  He states that 3 days ago he started having dizziness along with headache in the back of his head.  He states the room spins with him when he stands from a sitting position.  He also has dizziness when he lies down at night.  He denies any n/v/d or fever.  After further questioning, patient has admitted that 3 days ago he was working on a water heater at home and he got his foot caught on something and fell backwards.  He admits that he landed on his back and he hit his head on a piece of wood.  He does admit that he \"blacked out for a couple of seconds.\"  He has had headache and dizziness since this episode.  He denies any visual disturbances.  He did not go to ER or seek medication attention at that time.      Family History   Problem Relation Age of Onset   • Diabetes Mother    • Hypertension Mother    • Stroke Mother    • Heart disease Mother    • Rheum arthritis Father    • Heart disease Other    • Hypertension Other        Social History     Socioeconomic History   • Marital status: Single     Spouse name: Not on file   • Number of children: Not on file   • Years of education: Not on file   • Highest education level: Not on file   Tobacco Use   • Smoking status: Former Smoker   • Smokeless tobacco: Never Used   Substance and Sexual Activity   • Alcohol use: No     Comment: stopped drinking alcohol   • Drug use: No   • Sexual activity: Defer       Past Medical History:   Diagnosis Date   • Anxiety    • Bipolar disorder (CMS/[facility])    • Depression    • ED (erectile dysfunction)    • Hyperlipidemia    • Hypertension    • PTSD (post-traumatic stress disorder)    • Vitamin B12 deficiency  " "      Review of Systems   Constitutional: Negative.    HENT: Negative.    Respiratory: Negative.    Cardiovascular: Negative.    Gastrointestinal: Negative.    Musculoskeletal: Negative.    Skin: Negative.    Neurological: Positive for dizziness.   Psychiatric/Behavioral: Negative.        Objective   Physical Exam   Constitutional: He is oriented to person, place, and time. He appears well-developed and well-nourished.   HENT:   Right Ear: External ear normal.   Left Ear: External ear normal.   Nose: Nose normal.   Mouth/Throat: Oropharynx is clear and moist.   Eyes: Conjunctivae and EOM are normal.   Neck: Normal range of motion. Neck supple.   Cardiovascular: Normal rate, regular rhythm and normal heart sounds.   Pulmonary/Chest: Effort normal and breath sounds normal.   Musculoskeletal:   Left cervical paraspinal area very tender to palpation   Lymphadenopathy:     He has no cervical adenopathy.   Neurological: He is alert and oriented to person, place, and time.   Patient unsteady on his feet with standing from sitting position   Skin: Skin is warm and dry.   Psychiatric: He has a normal mood and affect. His behavior is normal. Judgment and thought content normal.   Nursing note and vitals reviewed.      Procedures    Vitals: Blood pressure 146/100, pulse 75, temperature 98.4 °F (36.9 °C), temperature source Oral, height 180.3 cm (71\"), weight 88.9 kg (196 lb), SpO2 98 %.    Body mass index is 27.34 kg/m².     Allergies:   Allergies   Allergen Reactions   • Demerol Hcl [Meperidine]    • Levaquin [Levofloxacin]    • Morphine And Related    • Prozac [Fluoxetine Hcl]         During this visit the following were done:  Labs Reviewed []    Labs Ordered []    Radiology Reports Reviewed []    Radiology Ordered []    PCP Records Reviewed []    Referring Provider Records Reviewed []    ER Records Reviewed []    Hospital Records Reviewed []    History Obtained From Family []    Radiology Images Reviewed []    Other " Reviewed []    Records Requested []      Assessment/Plan   Marilee was seen today for dizziness.    Diagnoses and all orders for this visit:    Dizziness  -     CT Head Without Contrast; Future    Acute post-traumatic headache, not intractable  -     CT Head Without Contrast; Future      CT of head scheduled at [facility]. Patient assisted to vehicle and wife notified of imaging appointment today.  She agreed to drive him there immediately for imaging.    To ER if his symptoms worsen.

## 2020-03-13 ENCOUNTER — TELEPHONE (OUTPATIENT)
Dept: FAMILY MEDICINE CLINIC | Facility: CLINIC | Age: 55
End: 2020-03-13

## 2020-03-13 RX ORDER — MECLIZINE HYDROCHLORIDE 25 MG/1
25 TABLET ORAL 3 TIMES DAILY PRN
Qty: 60 TABLET | Refills: 0 | Status: SHIPPED | OUTPATIENT
Start: 2020-03-13 | End: 2021-02-04 | Stop reason: SDUPTHER

## 2020-03-13 NOTE — TELEPHONE ENCOUNTER
----- Message from BJORN Vincent sent at 3/13/2020  8:24 AM EDT -----  CT of head was normal.  I have sent him a script for meclizine to the pharmacy.  Follow up next week if his symptoms have not improved.     Spoke with patient and made him aware. He voiced understanding. And will call for follow up if needed.

## 2020-03-13 NOTE — PROGRESS NOTES
CT of head was normal.  I have sent him a script for meclizine to the pharmacy.  Follow up next week if his symptoms have not improved.   Please let him know .

## 2020-03-18 ENCOUNTER — APPOINTMENT (OUTPATIENT)
Dept: MRI IMAGING | Facility: HOSPITAL | Age: 55
End: 2020-03-18

## 2020-03-23 ENCOUNTER — HOSPITAL ENCOUNTER (OUTPATIENT)
Dept: MRI IMAGING | Facility: HOSPITAL | Age: 55
Discharge: HOME | End: 2020-03-23
Admitting: NURSE PRACTITIONER

## 2020-03-23 PROCEDURE — 70551 MRI BRAIN STEM W/O DYE: CPT | Performed by: RADIOLOGY

## 2020-03-23 PROCEDURE — 70551 MRI BRAIN STEM W/O DYE: CPT

## 2020-03-24 ENCOUNTER — TELEPHONE (OUTPATIENT)
Dept: FAMILY MEDICINE CLINIC | Facility: CLINIC | Age: 55
End: 2020-03-24

## 2020-03-24 NOTE — TELEPHONE ENCOUNTER
----- Message from BJORN Vincent sent at 3/24/2020  9:02 AM EDT -----  His MRI of brain is normal.  Please let him know.         Patient notified verbalized understanding.

## 2020-07-17 ENCOUNTER — TELEPHONE (OUTPATIENT)
Dept: FAMILY MEDICINE CLINIC | Facility: CLINIC | Age: 55
End: 2020-07-17

## 2020-07-17 DIAGNOSIS — F32.A DEPRESSION, UNSPECIFIED DEPRESSION TYPE: ICD-10-CM

## 2020-07-17 RX ORDER — PAROXETINE 10 MG/1
10 TABLET, FILM COATED ORAL EVERY MORNING
Qty: 90 TABLET | Refills: 0 | Status: SHIPPED | OUTPATIENT
Start: 2020-07-17 | End: 2020-09-23 | Stop reason: SDUPTHER

## 2020-09-23 DIAGNOSIS — F32.A DEPRESSION, UNSPECIFIED DEPRESSION TYPE: ICD-10-CM

## 2020-09-23 NOTE — TELEPHONE ENCOUNTER
Called wanting to know if his Paxil could be changed to the 20 mg to give 1/2 tab daily & sent to [facility] so the cost would be only $4.00,pended for your approval.      Zoie notified.

## 2020-09-25 RX ORDER — PAROXETINE HYDROCHLORIDE 20 MG/1
10 TABLET, FILM COATED ORAL EVERY MORNING
Qty: 15 TABLET | Refills: 2 | Status: SHIPPED | OUTPATIENT
Start: 2020-09-25 | End: 2021-02-04 | Stop reason: SDUPTHER

## 2021-02-04 ENCOUNTER — OFFICE VISIT (OUTPATIENT)
Dept: FAMILY MEDICINE CLINIC | Facility: CLINIC | Age: 56
End: 2021-02-04

## 2021-02-04 VITALS
OXYGEN SATURATION: 99 % | BODY MASS INDEX: 26.88 KG/M2 | TEMPERATURE: 97.7 F | SYSTOLIC BLOOD PRESSURE: 140 MMHG | DIASTOLIC BLOOD PRESSURE: 96 MMHG | HEART RATE: 67 BPM | WEIGHT: 192 LBS | HEIGHT: 71 IN

## 2021-02-04 DIAGNOSIS — E78.2 MIXED HYPERLIPIDEMIA: ICD-10-CM

## 2021-02-04 DIAGNOSIS — E34.9 HYPOTESTOSTERONISM: ICD-10-CM

## 2021-02-04 DIAGNOSIS — I10 ESSENTIAL HYPERTENSION: ICD-10-CM

## 2021-02-04 DIAGNOSIS — M25.531 RIGHT WRIST PAIN: Primary | ICD-10-CM

## 2021-02-04 DIAGNOSIS — M79.10 MYALGIA: ICD-10-CM

## 2021-02-04 DIAGNOSIS — E55.9 VITAMIN D DEFICIENCY: ICD-10-CM

## 2021-02-04 DIAGNOSIS — F32.A DEPRESSION, UNSPECIFIED DEPRESSION TYPE: ICD-10-CM

## 2021-02-04 DIAGNOSIS — R42 DIZZINESS: ICD-10-CM

## 2021-02-04 DIAGNOSIS — R53.83 OTHER FATIGUE: ICD-10-CM

## 2021-02-04 DIAGNOSIS — K21.9 GASTROESOPHAGEAL REFLUX DISEASE: ICD-10-CM

## 2021-02-04 PROCEDURE — 36415 COLL VENOUS BLD VENIPUNCTURE: CPT | Performed by: NURSE PRACTITIONER

## 2021-02-04 PROCEDURE — 99214 OFFICE O/P EST MOD 30 MIN: CPT | Performed by: NURSE PRACTITIONER

## 2021-02-04 PROCEDURE — 80053 COMPREHEN METABOLIC PANEL: CPT | Performed by: NURSE PRACTITIONER

## 2021-02-04 PROCEDURE — 84443 ASSAY THYROID STIM HORMONE: CPT | Performed by: NURSE PRACTITIONER

## 2021-02-04 PROCEDURE — 85025 COMPLETE CBC W/AUTO DIFF WBC: CPT | Performed by: NURSE PRACTITIONER

## 2021-02-04 PROCEDURE — 80061 LIPID PANEL: CPT | Performed by: NURSE PRACTITIONER

## 2021-02-04 PROCEDURE — 84402 ASSAY OF FREE TESTOSTERONE: CPT | Performed by: NURSE PRACTITIONER

## 2021-02-04 PROCEDURE — 82607 VITAMIN B-12: CPT | Performed by: NURSE PRACTITIONER

## 2021-02-04 PROCEDURE — 82306 VITAMIN D 25 HYDROXY: CPT | Performed by: NURSE PRACTITIONER

## 2021-02-04 PROCEDURE — 83735 ASSAY OF MAGNESIUM: CPT | Performed by: NURSE PRACTITIONER

## 2021-02-04 PROCEDURE — 84403 ASSAY OF TOTAL TESTOSTERONE: CPT | Performed by: NURSE PRACTITIONER

## 2021-02-04 RX ORDER — LOSARTAN POTASSIUM 50 MG/1
75 TABLET ORAL DAILY
Qty: 135 TABLET | Refills: 1 | Status: SHIPPED | OUTPATIENT
Start: 2021-02-04 | End: 2022-08-08 | Stop reason: SDUPTHER

## 2021-02-04 RX ORDER — PAROXETINE HYDROCHLORIDE 20 MG/1
10 TABLET, FILM COATED ORAL EVERY MORNING
Qty: 45 TABLET | Refills: 1 | Status: SHIPPED | OUTPATIENT
Start: 2021-02-04 | End: 2021-04-12 | Stop reason: SDUPTHER

## 2021-02-04 RX ORDER — AMLODIPINE BESYLATE AND BENAZEPRIL HYDROCHLORIDE 5; 20 MG/1; MG/1
1 CAPSULE ORAL DAILY
Qty: 90 CAPSULE | Refills: 1 | Status: SHIPPED | OUTPATIENT
Start: 2021-02-04 | End: 2022-01-04 | Stop reason: SDUPTHER

## 2021-02-04 RX ORDER — OMEPRAZOLE 20 MG/1
20 CAPSULE, DELAYED RELEASE ORAL DAILY
Qty: 90 CAPSULE | Refills: 1 | Status: SHIPPED | OUTPATIENT
Start: 2021-02-04 | End: 2022-01-11 | Stop reason: SDUPTHER

## 2021-02-04 RX ORDER — FENOFIBRATE 145 MG/1
145 TABLET, COATED ORAL DAILY
Qty: 90 TABLET | Refills: 1 | Status: SHIPPED | OUTPATIENT
Start: 2021-02-04 | End: 2022-08-08 | Stop reason: SDUPTHER

## 2021-02-04 RX ORDER — MECLIZINE HYDROCHLORIDE 25 MG/1
25 TABLET ORAL 3 TIMES DAILY PRN
Qty: 60 TABLET | Refills: 0 | Status: SHIPPED | OUTPATIENT
Start: 2021-02-04 | End: 2021-08-12

## 2021-02-04 NOTE — PROGRESS NOTES
Chief Complaint  Fatigue (pt requests labs )    Subjective          Marilee Taylor presents to [facility] for   Fall  The accident occurred more than 1 week ago. The fall occurred while walking. There was no blood loss. Point of impact: fell and landed on open right hand. The pain is present in the right wrist. The pain is moderate. The symptoms are aggravated by movement. He has tried nothing for the symptoms.   Fatigue   This is a chronic problem. The current episode started more than 1 month ago. The problem occurs daily. The problem has been unchanged. Associated symptoms include headaches and fatigue. Nothing aggravates the symptoms. He has tried nothing for the symptoms.   Hypertension   This is a chronic problem. The current episode started more than 1 year ago. The problem is controlled. Pertinent negatives include no headaches, palpitations, peripheral edema or shortness of breath. Current antihypertension treatment includes calcium channel blockers. The current treatment provides significant improvement. Compliance problems include exercise and diet.    Heartburn   He complains of heartburn. This is a chronic problem. The current episode started more than 1 year ago. The problem occurs frequently. The problem has been waxing and waning. The heartburn does not wake him from sleep. The heartburn does not limit his activity. The heartburn doesn't change with position. The symptoms are aggravated by certain foods. Associated symptoms include fatigue. He has tried a PPI for the symptoms. The treatment provided significant relief.   Hyperlipidemia   This is a chronic problem. The current episode started more than 1 year ago. Factors aggravating his hyperlipidemia include fatty foods. Pertinent negatives include no shortness of breath. Current antihyperlipidemic treatment includes statins. The current treatment provides significant improvement of lipids. Compliance problems  "include adherence to exercise and adherence to diet.    Depression   Visit Type: follow-up (symptoms are well controlled at this time)  Patient presents with the following symptoms: depressed mood.  Patient is not experiencing: palpitations, shortness of breath, suicidal ideas, suicidal planning and thoughts of death.  Frequency of symptoms: most days   Severity: moderate   Sleep quality: fair  Nighttime awakenings: occasional  Compliance with medications:  % - taking paxil daily as prescribed  Treatment side effects: none.  Erectile Dysfunction   This is a chronic (hypotestosteronism) problem. The current episode started more than 1 year ago. The problem has been waxing and waning since onset. The nature of his difficulty is achieving erection and maintaining erection. Non-physiologic factors contributing to erectile dysfunction are a decreased libido. Irritative symptoms do not include frequency, nocturia or urgency. Obstructive symptoms do not include dribbling, incomplete emptying, straining or a weak stream. The symptoms are aggravated by medications.          Objective   Vital Signs:   /96 (BP Location: Right arm, Patient Position: Sitting, Cuff Size: Adult)   Pulse 67   Temp 97.7 °F (36.5 °C)   Ht 180.3 cm (71\")   Wt 87.1 kg (192 lb)   SpO2 99%   BMI 26.78 kg/m²     Physical Exam  Vitals signs and nursing note reviewed.   Constitutional:       Appearance: He is well-developed.   Neck:      Musculoskeletal: Normal range of motion and neck supple.   Cardiovascular:      Rate and Rhythm: Normal rate and regular rhythm.      Heart sounds: Normal heart sounds.   Pulmonary:      Effort: Pulmonary effort is normal.      Breath sounds: Normal breath sounds.   Musculoskeletal:      Comments: Tenderness to right wrist   Skin:     General: Skin is warm and dry.   Neurological:      Mental Status: He is alert and oriented to person, place, and time.   Psychiatric:         Behavior: Behavior normal.    "      Thought Content: Thought content normal.         Judgment: Judgment normal.        Result Review :                 Assessment and Plan    Problem List Items Addressed This Visit        Cardiac and Vasculature    Hyperlipidemia    Relevant Medications    fenofibrate (Tricor) 145 MG tablet    Other Relevant Orders    Lipid Panel    Hypertension    Relevant Medications    amLODIPine-benazepril (LOTREL 5-20) 5-20 MG per capsule    losartan (COZAAR) 50 MG tablet    Other Relevant Orders    CBC & Differential    Comprehensive Metabolic Panel    Magnesium    TSH    Vitamin B12    Vitamin D 25 Hydroxy    Testosterone, Free, Total       Endocrine and Metabolic    Vitamin D deficiency    Relevant Orders    Vitamin D 25 Hydroxy       Gastrointestinal Abdominal     Gastroesophageal reflux disease    Relevant Medications    omeprazole (priLOSEC) 20 MG capsule       Mental Health    Depression    Relevant Medications    PARoxetine (PAXIL) 20 MG tablet       Symptoms and Signs    Fatigue    Relevant Orders    CBC & Differential    Comprehensive Metabolic Panel    Magnesium    TSH    Vitamin B12    Vitamin D 25 Hydroxy    Testosterone, Free, Total      Other Visit Diagnoses     Right wrist pain    -  Primary    Relevant Orders    XR Wrist 3+ View Right    Hypotestosteronism        Relevant Orders    Testosterone, Free, Total    Myalgia        Relevant Orders    CBC & Differential    Comprehensive Metabolic Panel    Magnesium    TSH    Vitamin B12    Vitamin D 25 Hydroxy    Testosterone, Free, Total    Dizziness        Relevant Medications    meclizine (ANTIVERT) 25 MG tablet          Follow Up   Return in about 4 months (around 6/4/2021).  Patient was given instructions and counseling regarding his condition or for health maintenance advice. Please see specific information pulled into the AVS if appropriate.

## 2021-02-05 ENCOUNTER — TELEPHONE (OUTPATIENT)
Dept: FAMILY MEDICINE CLINIC | Facility: CLINIC | Age: 56
End: 2021-02-05

## 2021-02-05 LAB
25(OH)D3 SERPL-MCNC: 19.9 NG/ML (ref 30–100)
ALBUMIN SERPL-MCNC: 4.5 G/DL (ref 3.5–5.2)
ALBUMIN/GLOB SERPL: 1.7 G/DL
ALP SERPL-CCNC: 81 U/L (ref 39–117)
ALT SERPL W P-5'-P-CCNC: 22 U/L (ref 1–41)
ANION GAP SERPL CALCULATED.3IONS-SCNC: 13.1 MMOL/L (ref 5–15)
AST SERPL-CCNC: 20 U/L (ref 1–40)
BASOPHILS # BLD AUTO: 0.04 10*3/MM3 (ref 0–0.2)
BASOPHILS NFR BLD AUTO: 0.6 % (ref 0–1.5)
BILIRUB SERPL-MCNC: 0.3 MG/DL (ref 0–1.2)
BUN SERPL-MCNC: 9 MG/DL (ref 6–20)
BUN/CREAT SERPL: 10.8 (ref 7–25)
CALCIUM SPEC-SCNC: 9.3 MG/DL (ref 8.6–10.5)
CHLORIDE SERPL-SCNC: 103 MMOL/L (ref 98–107)
CHOLEST SERPL-MCNC: 205 MG/DL (ref 0–200)
CO2 SERPL-SCNC: 25.9 MMOL/L (ref 22–29)
CREAT SERPL-MCNC: 0.83 MG/DL (ref 0.76–1.27)
DEPRECATED RDW RBC AUTO: 40.9 FL (ref 37–54)
EOSINOPHIL # BLD AUTO: 0.24 10*3/MM3 (ref 0–0.4)
EOSINOPHIL NFR BLD AUTO: 3.5 % (ref 0.3–6.2)
ERYTHROCYTE [DISTWIDTH] IN BLOOD BY AUTOMATED COUNT: 13.5 % (ref 12.3–15.4)
GFR SERPL CREATININE-BSD FRML MDRD: 95 ML/MIN/1.73
GLOBULIN UR ELPH-MCNC: 2.7 GM/DL
GLUCOSE SERPL-MCNC: 75 MG/DL (ref 65–99)
HCT VFR BLD AUTO: 46.3 % (ref 37.5–51)
HDLC SERPL-MCNC: 29 MG/DL (ref 40–60)
HGB BLD-MCNC: 15.4 G/DL (ref 13–17.7)
IMM GRANULOCYTES # BLD AUTO: 0.04 10*3/MM3 (ref 0–0.05)
IMM GRANULOCYTES NFR BLD AUTO: 0.6 % (ref 0–0.5)
LDLC SERPL CALC-MCNC: 136 MG/DL (ref 0–100)
LDLC/HDLC SERPL: 4.54 {RATIO}
LYMPHOCYTES # BLD AUTO: 1.78 10*3/MM3 (ref 0.7–3.1)
LYMPHOCYTES NFR BLD AUTO: 26.1 % (ref 19.6–45.3)
MAGNESIUM SERPL-MCNC: 2.3 MG/DL (ref 1.6–2.6)
MCH RBC QN AUTO: 27.8 PG (ref 26.6–33)
MCHC RBC AUTO-ENTMCNC: 33.3 G/DL (ref 31.5–35.7)
MCV RBC AUTO: 83.6 FL (ref 79–97)
MONOCYTES # BLD AUTO: 0.6 10*3/MM3 (ref 0.1–0.9)
MONOCYTES NFR BLD AUTO: 8.8 % (ref 5–12)
NEUTROPHILS NFR BLD AUTO: 4.13 10*3/MM3 (ref 1.7–7)
NEUTROPHILS NFR BLD AUTO: 60.4 % (ref 42.7–76)
NRBC BLD AUTO-RTO: 0 /100 WBC (ref 0–0.2)
PLATELET # BLD AUTO: 250 10*3/MM3 (ref 140–450)
PMV BLD AUTO: 10.5 FL (ref 6–12)
POTASSIUM SERPL-SCNC: 3.4 MMOL/L (ref 3.5–5.2)
PROT SERPL-MCNC: 7.2 G/DL (ref 6–8.5)
RBC # BLD AUTO: 5.54 10*6/MM3 (ref 4.14–5.8)
SODIUM SERPL-SCNC: 142 MMOL/L (ref 136–145)
TRIGL SERPL-MCNC: 222 MG/DL (ref 0–150)
TSH SERPL DL<=0.05 MIU/L-ACNC: 1.73 UIU/ML (ref 0.27–4.2)
VIT B12 BLD-MCNC: 543 PG/ML (ref 211–946)
VLDLC SERPL-MCNC: 40 MG/DL (ref 5–40)
WBC # BLD AUTO: 6.83 10*3/MM3 (ref 3.4–10.8)

## 2021-02-05 RX ORDER — POTASSIUM CHLORIDE 20 MEQ/1
20 TABLET, EXTENDED RELEASE ORAL 2 TIMES DAILY
Qty: 2 TABLET | Refills: 0 | Status: SHIPPED | OUTPATIENT
Start: 2021-02-05 | End: 2022-08-08 | Stop reason: SDUPTHER

## 2021-02-05 RX ORDER — ERGOCALCIFEROL 1.25 MG/1
50000 CAPSULE ORAL WEEKLY
Qty: 12 CAPSULE | Refills: 0 | Status: SHIPPED | OUTPATIENT
Start: 2021-02-05 | End: 2022-08-08 | Stop reason: SDUPTHER

## 2021-02-05 NOTE — PROGRESS NOTES
Patient does have a low vitamin D level and I have sent a prescription for weekly vitamin D to his pharmacy.His potassium is a little low.  I have sent a prescription for 2 doses of potassium for him to take.His cholesterol and triglycerides are elevated.  Is he taking his fenofibrate daily as prescribed?

## 2021-02-08 ENCOUNTER — TELEPHONE (OUTPATIENT)
Dept: FAMILY MEDICINE CLINIC | Facility: CLINIC | Age: 56
End: 2021-02-08

## 2021-02-08 LAB
TESTOST FREE SERPL-MCNC: 5.4 PG/ML (ref 7.2–24)
TESTOST SERPL-MCNC: 208 NG/DL (ref 264–916)

## 2021-02-08 NOTE — TELEPHONE ENCOUNTER
Called pt and advised. Pt verbalized understanding. Pt stated he is willing to see Uro, however he does not want to see [facility].

## 2021-02-08 NOTE — TELEPHONE ENCOUNTER
----- Message from BJORN Vincent sent at 2/8/2021 10:22 AM EST -----  His testosterone level is low.  Would he like referral to urology?

## 2021-02-09 DIAGNOSIS — E34.9 HYPOTESTOSTERONISM: Primary | ICD-10-CM

## 2021-04-12 DIAGNOSIS — F32.A DEPRESSION, UNSPECIFIED DEPRESSION TYPE: ICD-10-CM

## 2021-04-12 RX ORDER — PAROXETINE HYDROCHLORIDE 20 MG/1
10 TABLET, FILM COATED ORAL EVERY MORNING
Qty: 45 TABLET | Refills: 1 | Status: SHIPPED | OUTPATIENT
Start: 2021-04-12 | End: 2021-08-12 | Stop reason: SDUPTHER

## 2021-08-12 ENCOUNTER — OFFICE VISIT (OUTPATIENT)
Dept: FAMILY MEDICINE CLINIC | Facility: CLINIC | Age: 56
End: 2021-08-12

## 2021-08-12 VITALS
HEART RATE: 77 BPM | BODY MASS INDEX: 27.02 KG/M2 | HEIGHT: 71 IN | TEMPERATURE: 98 F | DIASTOLIC BLOOD PRESSURE: 92 MMHG | WEIGHT: 193 LBS | SYSTOLIC BLOOD PRESSURE: 150 MMHG | OXYGEN SATURATION: 99 %

## 2021-08-12 DIAGNOSIS — E55.9 VITAMIN D DEFICIENCY: ICD-10-CM

## 2021-08-12 DIAGNOSIS — M25.539 PAIN IN WRIST, UNSPECIFIED LATERALITY: ICD-10-CM

## 2021-08-12 DIAGNOSIS — R53.83 OTHER FATIGUE: Primary | ICD-10-CM

## 2021-08-12 DIAGNOSIS — Z12.11 SCREENING FOR COLON CANCER: ICD-10-CM

## 2021-08-12 DIAGNOSIS — F32.A DEPRESSION, UNSPECIFIED DEPRESSION TYPE: ICD-10-CM

## 2021-08-12 DIAGNOSIS — K04.7 DENTAL ABSCESS: ICD-10-CM

## 2021-08-12 DIAGNOSIS — E78.2 MIXED HYPERLIPIDEMIA: ICD-10-CM

## 2021-08-12 PROCEDURE — 99214 OFFICE O/P EST MOD 30 MIN: CPT | Performed by: FAMILY MEDICINE

## 2021-08-12 RX ORDER — AMOXICILLIN 500 MG/1
500 CAPSULE ORAL 2 TIMES DAILY
Qty: 20 CAPSULE | Refills: 0 | Status: SHIPPED | OUTPATIENT
Start: 2021-08-12 | End: 2021-08-22

## 2021-08-12 RX ORDER — PAROXETINE HYDROCHLORIDE 20 MG/1
10 TABLET, FILM COATED ORAL EVERY MORNING
Qty: 45 TABLET | Refills: 1 | Status: SHIPPED | OUTPATIENT
Start: 2021-08-12 | End: 2021-12-21 | Stop reason: SDUPTHER

## 2021-08-12 NOTE — PROGRESS NOTES
"Chief Complaint  Fatigue and Wrist Pain (right wrist pain )    Subjective          Marilee Taylor presents to [facility]  Fatigue  This is a new problem. The current episode started more than 1 month ago. The problem occurs daily. The problem has been gradually worsening. Associated symptoms include fatigue. He has tried rest (sleeps in a chair, wakes hisself up most of the time. ) for the symptoms. The treatment provided mild relief.   Wrist Pain   The pain is present in the right wrist. This is a new problem. The current episode started 1 to 4 weeks ago. The problem occurs daily. The problem has been unchanged (fell about a month ago and has been hurting since then. ). The quality of the pain is described as sharp and aching. The symptoms are aggravated by activity.   Dental Pain   This is a new problem. The current episode started in the past 7 days. The problem occurs constantly. The problem has been gradually worsening. The pain is at a severity of 6/10. The pain is moderate. Associated symptoms include facial pain. Associated symptoms comments: Infected tooth, his appt isn't for a few weeks. . He has tried acetaminophen for the symptoms. The treatment provided mild relief.       Objective   Vital Signs:   /92 (BP Location: Left arm, Patient Position: Sitting)   Pulse 77   Temp 98 °F (36.7 °C) (Temporal)   Ht 180.3 cm (70.98\")   Wt 87.5 kg (193 lb)   SpO2 99%   BMI 26.93 kg/m²     Physical Exam  Constitutional:       General: He is not in acute distress.     Appearance: Normal appearance. He is well-developed and well-groomed. He is not ill-appearing, toxic-appearing or diaphoretic.   HENT:      Head: Normocephalic.      Nose: Nose normal. No congestion or rhinorrhea.      Mouth/Throat:      Mouth: Mucous membranes are moist.      Pharynx: Oropharynx is clear. No oropharyngeal exudate or posterior oropharyngeal erythema.   Eyes:      General: Lids are normal.         " Right eye: No discharge.         Left eye: No discharge.      Extraocular Movements: Extraocular movements intact.      Pupils: Pupils are equal, round, and reactive to light.   Neck:      Vascular: No carotid bruit.   Cardiovascular:      Rate and Rhythm: Normal rate and regular rhythm.      Pulses: Normal pulses.      Heart sounds: No murmur heard.   No friction rub. No gallop.    Pulmonary:      Effort: Pulmonary effort is normal. No respiratory distress.      Breath sounds: Normal breath sounds. No stridor. No wheezing, rhonchi or rales.   Chest:      Chest wall: No tenderness.   Abdominal:      General: There is no distension.      Palpations: Abdomen is soft. There is no mass.      Tenderness: There is no abdominal tenderness. There is no right CVA tenderness, left CVA tenderness, guarding or rebound.      Hernia: No hernia is present.   Musculoskeletal:         General: No swelling or tenderness. Normal range of motion.      Cervical back: Normal range of motion and neck supple. No rigidity or tenderness.      Right lower leg: No edema.      Left lower leg: No edema.   Lymphadenopathy:      Cervical: No cervical adenopathy.   Skin:     General: Skin is warm.      Capillary Refill: Capillary refill takes less than 2 seconds.      Coloration: Skin is not jaundiced.      Findings: No bruising, erythema or rash.   Neurological:      General: No focal deficit present.      Mental Status: He is alert and oriented to person, place, and time.      Motor: Motor function is intact. No weakness.      Coordination: Coordination is intact.      Gait: Gait is intact. Gait normal.   Psychiatric:         Attention and Perception: Attention normal.         Mood and Affect: Mood normal.         Speech: Speech normal.         Behavior: Behavior normal.         Cognition and Memory: Cognition normal.         Judgment: Judgment normal.        Social History     Tobacco Use   • Smoking status: Former Smoker   • Smokeless tobacco:  Never Used   Substance Use Topics   • Alcohol use: No     Comment: stopped drinking alcohol      Past Medical History:   • Anxiety   • Bipolar disorder (CMS/HCC)   • Depression   • ED (erectile dysfunction)   • Hyperlipidemia   • Hypertension   • PTSD (post-traumatic stress disorder)   • Vitamin B12 deficiency      Current Outpatient Medications on File Prior to Visit   Medication Sig   • albuterol sulfate  (90 Base) MCG/ACT inhaler Inhale 1-2 puffs Every 6 (Six) Hours As Needed for Wheezing. Every 4-6 hours as needed and as directed.   • amLODIPine-benazepril (LOTREL 5-20) 5-20 MG per capsule Take 1 capsule by mouth Daily.   • cholecalciferol (VITAMIN D3) 25 MCG (1000 UT) tablet Take 1 tablet by mouth Daily.   • losartan (COZAAR) 50 MG tablet Take 1.5 tablets by mouth Daily. For:  Hypertension.   • omeprazole (priLOSEC) 20 MG capsule Take 1 capsule by mouth Daily. For:  Esophageal reflux.   • [DISCONTINUED] PARoxetine (PAXIL) 20 MG tablet Take 0.5 tablets by mouth Every Morning.   • fenofibrate (Tricor) 145 MG tablet Take 1 tablet by mouth Daily.   • potassium chloride (K-DUR,KLOR-CON) 20 MEQ CR tablet Take 1 tablet by mouth 2 (Two) Times a Day.   • vitamin D (ERGOCALCIFEROL) 1.25 MG (12762 UT) capsule capsule Take 1 capsule by mouth 1 (One) Time Per Week.   • [DISCONTINUED] meclizine (ANTIVERT) 25 MG tablet Take 1 tablet by mouth 3 (Three) Times a Day As Needed for Dizziness.     No current facility-administered medications on file prior to visit.      Result Review :                 Assessment and Plan    Diagnoses and all orders for this visit:    1. Other fatigue (Primary)  -     T4, Free; Future  -     TSH; Future  -     Vitamin B12; Future    2. Depression, unspecified depression type  -     PARoxetine (PAXIL) 20 MG tablet; Take 0.5 tablets by mouth Every Morning.  Dispense: 45 tablet; Refill: 1    3. Pain in wrist, unspecified laterality  -     XR Wrist 3+ View Right (In Office)    4. Vitamin D  deficiency  -     Vitamin D 25 Hydroxy; Future    5. Mixed hyperlipidemia  -     CBC Auto Differential; Future  -     Comprehensive Metabolic Panel; Future  -     Lipid Panel; Future    6. Dental abscess  -     amoxicillin (AMOXIL) 500 MG capsule; Take 1 capsule by mouth 2 (Two) Times a Day for 10 days.  Dispense: 20 capsule; Refill: 0    Other orders  -     Ambulatory Referral For Screening Colonoscopy        Follow Up   Return in about 3 months (around 11/12/2021), or xray today, will come back fasting for labs tomorrow.  Patient was given instructions and counseling regarding his condition or for health maintenance advice. Please see specific information pulled into the AVS if appropriate.     Marilee Taylor  reports that he has quit smoking. He has never used smokeless tobacco.. I have educated him on the risk of diseases from using tobacco products such as cancer, COPD and heart disease.

## 2021-08-13 ENCOUNTER — TELEPHONE (OUTPATIENT)
Dept: FAMILY MEDICINE CLINIC | Facility: CLINIC | Age: 56
End: 2021-08-13

## 2021-08-13 NOTE — TELEPHONE ENCOUNTER
----- Message from BJORN Luevano sent at 8/13/2021  8:39 AM EDT -----  Please let patient know results are normal. Thank you.       Wife notified & verbalized understanding.

## 2021-08-26 ENCOUNTER — TELEPHONE (OUTPATIENT)
Dept: SURGERY | Facility: CLINIC | Age: 56
End: 2021-08-26

## 2021-09-21 ENCOUNTER — LAB (OUTPATIENT)
Dept: FAMILY MEDICINE CLINIC | Facility: CLINIC | Age: 56
End: 2021-09-21

## 2021-09-21 DIAGNOSIS — E78.2 MIXED HYPERLIPIDEMIA: ICD-10-CM

## 2021-09-21 DIAGNOSIS — E55.9 VITAMIN D DEFICIENCY: ICD-10-CM

## 2021-09-21 DIAGNOSIS — R53.83 OTHER FATIGUE: ICD-10-CM

## 2021-09-21 PROCEDURE — 36415 COLL VENOUS BLD VENIPUNCTURE: CPT

## 2021-09-21 PROCEDURE — 82306 VITAMIN D 25 HYDROXY: CPT | Performed by: FAMILY MEDICINE

## 2021-09-21 PROCEDURE — 80061 LIPID PANEL: CPT | Performed by: FAMILY MEDICINE

## 2021-09-21 PROCEDURE — 80053 COMPREHEN METABOLIC PANEL: CPT | Performed by: FAMILY MEDICINE

## 2021-09-21 PROCEDURE — 82607 VITAMIN B-12: CPT | Performed by: FAMILY MEDICINE

## 2021-09-21 PROCEDURE — 84439 ASSAY OF FREE THYROXINE: CPT | Performed by: FAMILY MEDICINE

## 2021-09-21 PROCEDURE — 85025 COMPLETE CBC W/AUTO DIFF WBC: CPT | Performed by: FAMILY MEDICINE

## 2021-09-21 PROCEDURE — 84443 ASSAY THYROID STIM HORMONE: CPT | Performed by: FAMILY MEDICINE

## 2021-09-22 LAB
25(OH)D3 SERPL-MCNC: 31.3 NG/ML (ref 30–100)
ALBUMIN SERPL-MCNC: 4.3 G/DL (ref 3.5–5.2)
ALBUMIN/GLOB SERPL: 1.9 G/DL
ALP SERPL-CCNC: 68 U/L (ref 39–117)
ALT SERPL W P-5'-P-CCNC: 31 U/L (ref 1–41)
ANION GAP SERPL CALCULATED.3IONS-SCNC: 9 MMOL/L (ref 5–15)
AST SERPL-CCNC: 24 U/L (ref 1–40)
BASOPHILS # BLD AUTO: 0.04 10*3/MM3 (ref 0–0.2)
BASOPHILS NFR BLD AUTO: 0.5 % (ref 0–1.5)
BILIRUB SERPL-MCNC: 0.5 MG/DL (ref 0–1.2)
BUN SERPL-MCNC: 10 MG/DL (ref 6–20)
BUN/CREAT SERPL: 11.8 (ref 7–25)
CALCIUM SPEC-SCNC: 8.9 MG/DL (ref 8.6–10.5)
CHLORIDE SERPL-SCNC: 104 MMOL/L (ref 98–107)
CHOLEST SERPL-MCNC: 211 MG/DL (ref 0–200)
CO2 SERPL-SCNC: 25 MMOL/L (ref 22–29)
CREAT SERPL-MCNC: 0.85 MG/DL (ref 0.76–1.27)
DEPRECATED RDW RBC AUTO: 41.5 FL (ref 37–54)
EOSINOPHIL # BLD AUTO: 0.27 10*3/MM3 (ref 0–0.4)
EOSINOPHIL NFR BLD AUTO: 3.6 % (ref 0.3–6.2)
ERYTHROCYTE [DISTWIDTH] IN BLOOD BY AUTOMATED COUNT: 13.6 % (ref 12.3–15.4)
GFR SERPL CREATININE-BSD FRML MDRD: 93 ML/MIN/1.73
GLOBULIN UR ELPH-MCNC: 2.3 GM/DL
GLUCOSE SERPL-MCNC: 71 MG/DL (ref 65–99)
HCT VFR BLD AUTO: 41 % (ref 37.5–51)
HDLC SERPL-MCNC: 28 MG/DL (ref 40–60)
HGB BLD-MCNC: 13.7 G/DL (ref 13–17.7)
IMM GRANULOCYTES # BLD AUTO: 0.04 10*3/MM3 (ref 0–0.05)
IMM GRANULOCYTES NFR BLD AUTO: 0.5 % (ref 0–0.5)
LDLC SERPL CALC-MCNC: 147 MG/DL (ref 0–100)
LDLC/HDLC SERPL: 5.12 {RATIO}
LYMPHOCYTES # BLD AUTO: 1.89 10*3/MM3 (ref 0.7–3.1)
LYMPHOCYTES NFR BLD AUTO: 25.1 % (ref 19.6–45.3)
MCH RBC QN AUTO: 28.2 PG (ref 26.6–33)
MCHC RBC AUTO-ENTMCNC: 33.4 G/DL (ref 31.5–35.7)
MCV RBC AUTO: 84.4 FL (ref 79–97)
MONOCYTES # BLD AUTO: 0.72 10*3/MM3 (ref 0.1–0.9)
MONOCYTES NFR BLD AUTO: 9.6 % (ref 5–12)
NEUTROPHILS NFR BLD AUTO: 4.57 10*3/MM3 (ref 1.7–7)
NEUTROPHILS NFR BLD AUTO: 60.7 % (ref 42.7–76)
NRBC BLD AUTO-RTO: 0 /100 WBC (ref 0–0.2)
PLATELET # BLD AUTO: 221 10*3/MM3 (ref 140–450)
PMV BLD AUTO: 10.4 FL (ref 6–12)
POTASSIUM SERPL-SCNC: 3.3 MMOL/L (ref 3.5–5.2)
PROT SERPL-MCNC: 6.6 G/DL (ref 6–8.5)
RBC # BLD AUTO: 4.86 10*6/MM3 (ref 4.14–5.8)
SODIUM SERPL-SCNC: 138 MMOL/L (ref 136–145)
T4 FREE SERPL-MCNC: 0.97 NG/DL (ref 0.93–1.7)
TRIGL SERPL-MCNC: 198 MG/DL (ref 0–150)
TSH SERPL DL<=0.05 MIU/L-ACNC: 2.02 UIU/ML (ref 0.27–4.2)
VIT B12 BLD-MCNC: 1069 PG/ML (ref 211–946)
VLDLC SERPL-MCNC: 36 MG/DL (ref 5–40)
WBC # BLD AUTO: 7.53 10*3/MM3 (ref 3.4–10.8)

## 2021-09-23 ENCOUNTER — TELEPHONE (OUTPATIENT)
Dept: FAMILY MEDICINE CLINIC | Facility: CLINIC | Age: 56
End: 2021-09-23

## 2021-09-23 NOTE — TELEPHONE ENCOUNTER
----- Message from BJORN Luevano sent at 9/23/2021  3:53 PM EDT -----  Please send a letter letting the patient know labs are stable. Thank you        Stable letter mailed.

## 2021-09-30 ENCOUNTER — TELEPHONE (OUTPATIENT)
Dept: FAMILY MEDICINE CLINIC | Facility: CLINIC | Age: 56
End: 2021-09-30

## 2021-09-30 NOTE — TELEPHONE ENCOUNTER
Caller: Marilee Taylor    Relationship: Self    Best call back number: [phone]    Caller requesting test results: PATIENT'S WIFE    What test was performed: LABS    When was the test performed: LAST WEEK    Where was the test performed: OFFICE

## 2021-12-21 DIAGNOSIS — F32.A DEPRESSION, UNSPECIFIED DEPRESSION TYPE: ICD-10-CM

## 2021-12-21 NOTE — TELEPHONE ENCOUNTER
Caller: ElpidioZoie    Relationship: Emergency Contact    Best call back number: [phone]   Requested Prescriptions:   Requested Prescriptions     Pending Prescriptions Disp Refills   • PARoxetine (PAXIL) 20 MG tablet 45 tablet 1     Sig: Take 0.5 tablets by mouth Every Morning.        Pharmacy where request should be sent: [facility] [address] [place] [address] 192 - [phone]  - [phone]     Additional details provided by patient: 2 DAYS LEFT    Does the patient have less than a 3 day supply:  [x] Yes  [] No    Anthony Cheney Rep   12/21/21 14:51 EST

## 2021-12-22 RX ORDER — PAROXETINE HYDROCHLORIDE 20 MG/1
10 TABLET, FILM COATED ORAL EVERY MORNING
Qty: 30 TABLET | Refills: 0 | Status: SHIPPED | OUTPATIENT
Start: 2021-12-22 | End: 2022-02-25 | Stop reason: SDUPTHER

## 2021-12-22 NOTE — TELEPHONE ENCOUNTER
Caller: Zoie Magallanes    Relationship: Emergency Contact    Best call back number: [phone]   Requested Prescriptions:   Requested Prescriptions     Signed Prescriptions Disp Refills   • PARoxetine (PAXIL) 20 MG tablet 30 tablet 0     Sig: Take 0.5 tablets by mouth Every Morning.     Authorizing Provider: LIZANDRO SIMON        Pharmacy where request should be sent: [facility] [address] [place] [address] 192 - [phone]  - [phone]     Additional details provided by patient: 2 DAYS LEFT    Does the patient have less than a 3 day supply:  [x] Yes  [] No    Maria Elena Santiago LPN   12/22/21 13:45 EST     Notified will call back & schedule a F/U.

## 2022-01-04 ENCOUNTER — OFFICE VISIT (OUTPATIENT)
Dept: FAMILY MEDICINE CLINIC | Facility: CLINIC | Age: 57
End: 2022-01-04

## 2022-01-04 VITALS — TEMPERATURE: 97.5 F | OXYGEN SATURATION: 97 % | RESPIRATION RATE: 18 BRPM | HEART RATE: 75 BPM

## 2022-01-04 DIAGNOSIS — R05.9 COUGH: ICD-10-CM

## 2022-01-04 DIAGNOSIS — I10 ESSENTIAL HYPERTENSION: ICD-10-CM

## 2022-01-04 DIAGNOSIS — J22 LOWER RESPIRATORY INFECTION (E.G., BRONCHITIS, PNEUMONIA, PNEUMONITIS, PULMONITIS): Primary | ICD-10-CM

## 2022-01-04 LAB
B PARAPERT DNA SPEC QL NAA+PROBE: NOT DETECTED
B PERT DNA SPEC QL NAA+PROBE: NOT DETECTED
C PNEUM DNA NPH QL NAA+NON-PROBE: NOT DETECTED
FLUAV SUBTYP SPEC NAA+PROBE: NOT DETECTED
FLUBV RNA ISLT QL NAA+PROBE: NOT DETECTED
HADV DNA SPEC NAA+PROBE: NOT DETECTED
HCOV 229E RNA SPEC QL NAA+PROBE: NOT DETECTED
HCOV HKU1 RNA SPEC QL NAA+PROBE: NOT DETECTED
HCOV NL63 RNA SPEC QL NAA+PROBE: NOT DETECTED
HCOV OC43 RNA SPEC QL NAA+PROBE: NOT DETECTED
HMPV RNA NPH QL NAA+NON-PROBE: NOT DETECTED
HPIV1 RNA ISLT QL NAA+PROBE: NOT DETECTED
HPIV2 RNA SPEC QL NAA+PROBE: NOT DETECTED
HPIV3 RNA NPH QL NAA+PROBE: NOT DETECTED
HPIV4 P GENE NPH QL NAA+PROBE: NOT DETECTED
M PNEUMO IGG SER IA-ACNC: NOT DETECTED
RHINOVIRUS RNA SPEC NAA+PROBE: NOT DETECTED
RSV RNA NPH QL NAA+NON-PROBE: NOT DETECTED
SARS-COV-2 RNA NPH QL NAA+NON-PROBE: DETECTED

## 2022-01-04 PROCEDURE — 99213 OFFICE O/P EST LOW 20 MIN: CPT | Performed by: NURSE PRACTITIONER

## 2022-01-04 PROCEDURE — 0202U NFCT DS 22 TRGT SARS-COV-2: CPT | Performed by: NURSE PRACTITIONER

## 2022-01-04 RX ORDER — OMEPRAZOLE 20 MG/1
20 CAPSULE, DELAYED RELEASE ORAL DAILY
Qty: 90 CAPSULE | Refills: 1 | Status: CANCELLED | OUTPATIENT
Start: 2022-01-04

## 2022-01-04 RX ORDER — AZITHROMYCIN 250 MG/1
TABLET, FILM COATED ORAL
Qty: 6 TABLET | Refills: 0 | Status: SHIPPED | OUTPATIENT
Start: 2022-01-04 | End: 2022-01-14

## 2022-01-04 RX ORDER — AMLODIPINE BESYLATE AND BENAZEPRIL HYDROCHLORIDE 5; 20 MG/1; MG/1
1 CAPSULE ORAL DAILY
Qty: 90 CAPSULE | Refills: 1 | Status: CANCELLED | OUTPATIENT
Start: 2022-01-04

## 2022-01-04 RX ORDER — PREDNISONE 20 MG/1
40 TABLET ORAL DAILY
Qty: 10 TABLET | Refills: 0 | Status: SHIPPED | OUTPATIENT
Start: 2022-01-04 | End: 2022-01-09

## 2022-01-04 RX ORDER — ALBUTEROL SULFATE 90 UG/1
2 AEROSOL, METERED RESPIRATORY (INHALATION) EVERY 4 HOURS PRN
Qty: 18 G | Refills: 5 | Status: SHIPPED | OUTPATIENT
Start: 2022-01-04 | End: 2022-02-03
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
## 2022-01-04 NOTE — TELEPHONE ENCOUNTER
Caller: ElpidioZoie    Relationship: Emergency Contact    Best call back number: [phone]    Requested Prescriptions:   Requested Prescriptions     Pending Prescriptions Disp Refills   • amLODIPine-benazepril (LOTREL 5-20) 5-20 MG per capsule 90 capsule 1     Sig: Take 1 capsule by mouth Daily.   • albuterol sulfate  (90 Base) MCG/ACT inhaler 18 g 5     Sig: Inhale 1-2 puffs Every 6 (Six) Hours As Needed for Wheezing. Every 4-6 hours as needed and as directed.   STOMACH PILL  PREDNISONE AND INHALER ARE NEW.     Pharmacy where request should be sent: [id number] [place] [address] [facility] [address] [facility] [address] - [phone]  - [phone] FX     Additional details provided by patient: ASIA IS CLOSE TO THE PHARMACY.    Does the patient have less than a 3 day supply:  [x] Yes  [] No    Anthony Dela Cruz Rep   01/04/22 16:27 EST      PLEASE CALL PATIENT WHEN SENT TO PHARMACY.

## 2022-01-05 ENCOUNTER — TELEPHONE (OUTPATIENT)
Dept: FAMILY MEDICINE CLINIC | Facility: CLINIC | Age: 57
End: 2022-01-05

## 2022-01-05 RX ORDER — ALBUTEROL SULFATE 90 UG/1
1-2 AEROSOL, METERED RESPIRATORY (INHALATION) EVERY 6 HOURS PRN
Qty: 18 G | Refills: 5 | OUTPATIENT
Start: 2022-01-05

## 2022-01-05 NOTE — TELEPHONE ENCOUNTER
----- Message from BJORN Huber sent at 1/5/2022  8:07 AM EST -----  Please call the patient regarding his abnormal result.    He is positive for COVID-19.  Can do vitamin C, vitamin D, zinc.  Cough and deep breathe, ambulate often.     Spoke with wife & she verbalized understanding.

## 2022-01-07 RX ORDER — AMLODIPINE BESYLATE AND BENAZEPRIL HYDROCHLORIDE 5; 20 MG/1; MG/1
1 CAPSULE ORAL DAILY
Qty: 30 CAPSULE | Refills: 0 | Status: SHIPPED | OUTPATIENT
Start: 2022-01-07 | End: 2022-01-11 | Stop reason: SDUPTHER

## 2022-01-10 ENCOUNTER — TELEPHONE (OUTPATIENT)
Dept: FAMILY MEDICINE CLINIC | Facility: CLINIC | Age: 57
End: 2022-01-10

## 2022-01-10 NOTE — TELEPHONE ENCOUNTER
Caller: ElpidioZoie    Relationship: Emergency Contact    Best call back number: [phone]    Requested Prescriptions:   Requested Prescriptions     Signed Prescriptions Disp Refills   • amLODIPine-benazepril (LOTREL 5-20) 5-20 MG per capsule 30 capsule 0     Sig: Take 1 capsule by mouth Daily.     Authorizing Provider: TRI LOTT     Refused Prescriptions Disp Refills   • albuterol sulfate  (90 Base) MCG/ACT inhaler 18 g 5     Sig: Inhale 1-2 puffs Every 6 (Six) Hours As Needed for Wheezing. Every 4-6 hours as needed and as directed.     Refused By: MARIA ELENA NOGUERA     Reason for Refusal: Refill not appropriate   STOMACH PILL  PREDNISONE AND INHALER ARE NEW.     Pharmacy where request should be sent: [facility] #[id number] [address] [facility] [address] - [phone]  - [phone] FX     Additional details provided by patient: ASIA IS CLOSE TO THE PHARMACY.      Notified.    Does the patient have less than a 3 day supply:  [x] Yes  [] No    Maria Elena Noguera LPN   01/10/22 10:08 EST      PLEASE CALL PATIENT WHEN SENT TO PHARMACY.

## 2022-01-10 NOTE — TELEPHONE ENCOUNTER
Girlfriend called requesting refills on his Protonix 40 mg (not listed she reports this is what he takes) & his Lotrel.

## 2022-01-11 DIAGNOSIS — I10 ESSENTIAL HYPERTENSION: ICD-10-CM

## 2022-01-11 DIAGNOSIS — K21.9 GASTROESOPHAGEAL REFLUX DISEASE: ICD-10-CM

## 2022-01-11 RX ORDER — OMEPRAZOLE 20 MG/1
20 CAPSULE, DELAYED RELEASE ORAL DAILY
Qty: 90 CAPSULE | Refills: 1 | Status: SHIPPED | OUTPATIENT
Start: 2022-01-11 | End: 2022-08-08 | Stop reason: SDUPTHER

## 2022-01-11 RX ORDER — AMLODIPINE BESYLATE AND BENAZEPRIL HYDROCHLORIDE 5; 20 MG/1; MG/1
1 CAPSULE ORAL DAILY
Qty: 30 CAPSULE | Refills: 0 | Status: SHIPPED | OUTPATIENT
Start: 2022-01-11 | End: 2022-02-25 | Stop reason: SDUPTHER

## 2022-01-11 NOTE — TELEPHONE ENCOUNTER
I have sent in refills but please inform the patient that he must schedule follow-up with Danna this month for management of chronic conditions and labs.        Spoke with patient & an appointment was scheduled.

## 2022-01-11 NOTE — TELEPHONE ENCOUNTER
I have sent in refills but please inform the patient that he must schedule follow-up with Danna this month for management of chronic conditions and labs.

## 2022-01-14 ENCOUNTER — OFFICE VISIT (OUTPATIENT)
Dept: FAMILY MEDICINE CLINIC | Facility: CLINIC | Age: 57
End: 2022-01-14

## 2022-01-14 VITALS
OXYGEN SATURATION: 99 % | HEART RATE: 87 BPM | TEMPERATURE: 96.6 F | HEIGHT: 71 IN | SYSTOLIC BLOOD PRESSURE: 138 MMHG | WEIGHT: 198.8 LBS | BODY MASS INDEX: 27.83 KG/M2 | DIASTOLIC BLOOD PRESSURE: 80 MMHG

## 2022-01-14 DIAGNOSIS — U07.1 COVID-19: Primary | ICD-10-CM

## 2022-01-14 DIAGNOSIS — K12.2 INFECTION OF MOUTH: ICD-10-CM

## 2022-01-14 PROCEDURE — 99213 OFFICE O/P EST LOW 20 MIN: CPT | Performed by: NURSE PRACTITIONER

## 2022-01-14 RX ORDER — GUAIFENESIN 600 MG/1
1200 TABLET, EXTENDED RELEASE ORAL DAILY
COMMUNITY
End: 2022-08-08

## 2022-01-14 RX ORDER — AMOXICILLIN AND CLAVULANATE POTASSIUM 875; 125 MG/1; MG/1
1 TABLET, FILM COATED ORAL 2 TIMES DAILY
Qty: 20 TABLET | Refills: 0 | Status: SHIPPED | OUTPATIENT
Start: 2022-01-14 | End: 2022-01-24

## 2022-01-14 RX ORDER — ZINC SULFATE 50(220)MG
220 CAPSULE ORAL DAILY
COMMUNITY
End: 2022-08-08
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
## 2022-02-25 ENCOUNTER — TELEPHONE (OUTPATIENT)
Dept: FAMILY MEDICINE CLINIC | Facility: CLINIC | Age: 57
End: 2022-02-25

## 2022-02-25 DIAGNOSIS — F32.A DEPRESSION, UNSPECIFIED DEPRESSION TYPE: ICD-10-CM

## 2022-02-25 DIAGNOSIS — I10 ESSENTIAL HYPERTENSION: ICD-10-CM

## 2022-02-25 RX ORDER — AMLODIPINE BESYLATE AND BENAZEPRIL HYDROCHLORIDE 5; 20 MG/1; MG/1
1 CAPSULE ORAL DAILY
Qty: 30 CAPSULE | Refills: 0 | Status: SHIPPED | OUTPATIENT
Start: 2022-02-25 | End: 2022-03-28

## 2022-02-25 RX ORDER — PAROXETINE HYDROCHLORIDE 20 MG/1
10 TABLET, FILM COATED ORAL EVERY MORNING
Qty: 30 TABLET | Refills: 0 | Status: SHIPPED | OUTPATIENT
Start: 2022-02-25 | End: 2022-06-30 | Stop reason: SDUPTHER

## 2022-02-25 NOTE — TELEPHONE ENCOUNTER
Caller: Zoie Magallanes    Relationship: Emergency Contact    Best call back number: [phone]    Requested Prescriptions:   Requested Prescriptions     Pending Prescriptions Disp Refills   • PARoxetine (PAXIL) 20 MG tablet 30 tablet 0     Sig: Take 0.5 tablets by mouth Every Morning.   • amLODIPine-benazepril (LOTREL 5-20) 5-20 MG per capsule 30 capsule 0     Sig: Take 1 capsule by mouth Daily.        Pharmacy where request should be sent: [facility] [address] [place] [address] - [phone] [place] [phone] FX  [id number] - [place] [address] AT [facility] [address] [facility] [address] - [phone]  - [phone] FX     Additional details provided by patient: SPOUSE CALLED IN REQUESTING REFILLS ON THE ABOVE LISTED PRESCRIPTIONS THEY WILL GO TO MULTIPLE PHARMACIES  PAXIL-[facility]  [facility]  AMLODIPINE-[facility] [address] [facility]    Does the patient have less than a 3 day supply:  [x] Yes  [] No    Anthony Jarvis Rep   02/25/22 12:49 EST

## 2022-03-18 NOTE — TELEPHONE ENCOUNTER
Caller: Zoie Magallanes    Relationship: Emergency Contact    Best call back number:     [phone]     Medication needed:   Requested Prescriptions     Pending Prescriptions Disp Refills   • PARoxetine (PAXIL) 20 MG tablet 45 tablet 1     Sig: Take 0.5 tablets by mouth Every Morning.     When do you need the refill by:    ASAP    What additional details did the patient provide when requesting the medication:     PATIENT HAS (1) TABLET LEFT     Does the patient have less than a 3 day supply:  [x] Yes  [] No    What is the patient's preferred pharmacy:      [facility] - [place], [place]    TELEPHONE CONTACT:    [phone]    FAX:    [phone]    
You can access the FollowMyHealth Patient Portal offered by [facility] by registering at the following website: [email or web address]. By joining [facility]’s FollowMyHealth portal, you will also be able to view your health information using other applications (apps) compatible with our system.

## 2022-03-26 DIAGNOSIS — I10 ESSENTIAL HYPERTENSION: ICD-10-CM

## 2022-03-28 RX ORDER — AMLODIPINE BESYLATE AND BENAZEPRIL HYDROCHLORIDE 5; 20 MG/1; MG/1
CAPSULE ORAL
Qty: 30 CAPSULE | Refills: 0 | Status: SHIPPED | OUTPATIENT
Start: 2022-03-28 | End: 2022-06-06

## 2022-03-29 ENCOUNTER — TELEPHONE (OUTPATIENT)
Dept: FAMILY MEDICINE CLINIC | Facility: CLINIC | Age: 57
End: 2022-03-29

## 2022-03-29 NOTE — TELEPHONE ENCOUNTER
Asaf wife called indicating he has a bad tooth infection and cant be seen at the dentist until next week.  She had asked if Danna could call something in and I told her she was out of the office today.  She requested I ask another provider.  Please advise.

## 2022-03-30 NOTE — TELEPHONE ENCOUNTER
Needs an appointment    Spoke with wife & they are at the dentist at present,he got an appointment there.

## 2022-05-05 ENCOUNTER — TELEPHONE (OUTPATIENT)
Dept: FAMILY MEDICINE CLINIC | Facility: CLINIC | Age: 57
End: 2022-05-05

## 2022-06-06 DIAGNOSIS — I10 ESSENTIAL HYPERTENSION: ICD-10-CM

## 2022-06-06 RX ORDER — AMLODIPINE BESYLATE AND BENAZEPRIL HYDROCHLORIDE 5; 20 MG/1; MG/1
CAPSULE ORAL
Qty: 30 CAPSULE | Refills: 0 | Status: SHIPPED | OUTPATIENT
Start: 2022-06-06 | End: 2022-08-08 | Stop reason: SDUPTHER

## 2022-06-30 DIAGNOSIS — F32.A DEPRESSION, UNSPECIFIED DEPRESSION TYPE: ICD-10-CM

## 2022-07-05 RX ORDER — PAROXETINE HYDROCHLORIDE 20 MG/1
10 TABLET, FILM COATED ORAL EVERY MORNING
Qty: 15 TABLET | Refills: 0 | Status: SHIPPED | OUTPATIENT
Start: 2022-07-05 | End: 2022-08-08 | Stop reason: SDUPTHER

## 2022-08-01 DIAGNOSIS — I10 ESSENTIAL HYPERTENSION: ICD-10-CM

## 2022-08-02 DIAGNOSIS — I10 ESSENTIAL HYPERTENSION: ICD-10-CM

## 2022-08-02 RX ORDER — AMLODIPINE BESYLATE AND BENAZEPRIL HYDROCHLORIDE 5; 20 MG/1; MG/1
CAPSULE ORAL
Qty: 30 CAPSULE | Refills: 0 | OUTPATIENT
Start: 2022-08-02

## 2022-08-02 NOTE — TELEPHONE ENCOUNTER
Caller: Marilee Taylor    Relationship: Self    Best call back number: [phone]    Requested Prescriptions:   amLODIPine-benazepril (LOTREL 5-20) 5-20 MG per capsule    Pharmacy where request should be sent: [facility] #[id number] [place] [address] [facility] [address] - [phone]  - [phone] FX     Additional details provided by patient: PLEASE REFILL    Does the patient have less than a 3 day supply:  [] Yes  [x] No    Anthony Ardon Rep   08/02/22 15:42 EDT     THANK YOU.

## 2022-08-03 RX ORDER — AMLODIPINE BESYLATE AND BENAZEPRIL HYDROCHLORIDE 5; 20 MG/1; MG/1
1 CAPSULE ORAL DAILY
Qty: 30 CAPSULE | Refills: 0 | OUTPATIENT
Start: 2022-08-03

## 2022-08-03 NOTE — TELEPHONE ENCOUNTER
He needs to be seen and have labs done.  He has not had labs done since 9/21/2021 and his potassium was low at that time.  It needs to be rechecked.  Have him schedule a follow-up sooner so we can get labs.      Spoke with Zoie & scheduled him for tomorrow.

## 2022-08-03 NOTE — TELEPHONE ENCOUNTER
Called patient no answer no voicemail.      Spoke with Zoie he saw Zuleyma in January,scheduled a FU with her for 8/17/2022,pended a one month refill until he can be seen for approval.

## 2022-08-03 NOTE — TELEPHONE ENCOUNTER
He needs to be seen and have labs done.  He has not had labs done since 9/21/2021 and his potassium was low at that time.  It needs to be rechecked.  Have him schedule a follow-up sooner so we can get labs.

## 2022-08-08 ENCOUNTER — OFFICE VISIT (OUTPATIENT)
Dept: FAMILY MEDICINE CLINIC | Facility: CLINIC | Age: 57
End: 2022-08-08

## 2022-08-08 VITALS
WEIGHT: 197.2 LBS | OXYGEN SATURATION: 98 % | HEIGHT: 71 IN | RESPIRATION RATE: 16 BRPM | DIASTOLIC BLOOD PRESSURE: 78 MMHG | BODY MASS INDEX: 27.61 KG/M2 | HEART RATE: 91 BPM | SYSTOLIC BLOOD PRESSURE: 136 MMHG | TEMPERATURE: 97.3 F

## 2022-08-08 DIAGNOSIS — F32.A DEPRESSION, UNSPECIFIED DEPRESSION TYPE: ICD-10-CM

## 2022-08-08 DIAGNOSIS — E55.9 VITAMIN D DEFICIENCY: ICD-10-CM

## 2022-08-08 DIAGNOSIS — Z00.00 MEDICARE ANNUAL WELLNESS VISIT, SUBSEQUENT: Primary | ICD-10-CM

## 2022-08-08 DIAGNOSIS — K21.9 GASTROESOPHAGEAL REFLUX DISEASE: ICD-10-CM

## 2022-08-08 DIAGNOSIS — R59.0 ENLARGED LYMPH NODE IN NECK: ICD-10-CM

## 2022-08-08 DIAGNOSIS — I10 ESSENTIAL HYPERTENSION: ICD-10-CM

## 2022-08-08 DIAGNOSIS — E78.2 MIXED HYPERLIPIDEMIA: ICD-10-CM

## 2022-08-08 DIAGNOSIS — Z12.5 SCREENING PSA (PROSTATE SPECIFIC ANTIGEN): ICD-10-CM

## 2022-08-08 DIAGNOSIS — E87.6 HYPOKALEMIA: ICD-10-CM

## 2022-08-08 PROCEDURE — 80053 COMPREHEN METABOLIC PANEL: CPT | Performed by: NURSE PRACTITIONER

## 2022-08-08 PROCEDURE — 82306 VITAMIN D 25 HYDROXY: CPT | Performed by: NURSE PRACTITIONER

## 2022-08-08 PROCEDURE — G0103 PSA SCREENING: HCPCS | Performed by: NURSE PRACTITIONER

## 2022-08-08 PROCEDURE — 85025 COMPLETE CBC W/AUTO DIFF WBC: CPT | Performed by: NURSE PRACTITIONER

## 2022-08-08 PROCEDURE — 84403 ASSAY OF TOTAL TESTOSTERONE: CPT | Performed by: NURSE PRACTITIONER

## 2022-08-08 PROCEDURE — 84402 ASSAY OF FREE TESTOSTERONE: CPT | Performed by: NURSE PRACTITIONER

## 2022-08-08 PROCEDURE — G0439 PPPS, SUBSEQ VISIT: HCPCS | Performed by: NURSE PRACTITIONER

## 2022-08-08 PROCEDURE — 1126F AMNT PAIN NOTED NONE PRSNT: CPT | Performed by: NURSE PRACTITIONER

## 2022-08-08 PROCEDURE — 84443 ASSAY THYROID STIM HORMONE: CPT | Performed by: NURSE PRACTITIONER

## 2022-08-08 PROCEDURE — 1170F FXNL STATUS ASSESSED: CPT | Performed by: NURSE PRACTITIONER

## 2022-08-08 PROCEDURE — 80061 LIPID PANEL: CPT | Performed by: NURSE PRACTITIONER

## 2022-08-08 PROCEDURE — 1159F MED LIST DOCD IN RCRD: CPT | Performed by: NURSE PRACTITIONER

## 2022-08-08 PROCEDURE — 82607 VITAMIN B-12: CPT | Performed by: NURSE PRACTITIONER

## 2022-08-08 RX ORDER — LOSARTAN POTASSIUM 50 MG/1
75 TABLET ORAL DAILY
Qty: 135 TABLET | Refills: 1 | Status: SHIPPED | OUTPATIENT
Start: 2022-08-08

## 2022-08-08 RX ORDER — FENOFIBRATE 145 MG/1
145 TABLET, COATED ORAL DAILY
Qty: 90 TABLET | Refills: 1 | Status: SHIPPED | OUTPATIENT
Start: 2022-08-08

## 2022-08-08 RX ORDER — ALBUTEROL SULFATE 90 UG/1
2 AEROSOL, METERED RESPIRATORY (INHALATION) EVERY 4 HOURS PRN
Qty: 18 G | Refills: 5 | Status: SHIPPED | OUTPATIENT
Start: 2022-08-08

## 2022-08-08 RX ORDER — OMEPRAZOLE 20 MG/1
20 CAPSULE, DELAYED RELEASE ORAL DAILY
Qty: 90 CAPSULE | Refills: 1 | Status: SHIPPED | OUTPATIENT
Start: 2022-08-08

## 2022-08-08 RX ORDER — ERGOCALCIFEROL 1.25 MG/1
50000 CAPSULE ORAL WEEKLY
Qty: 12 CAPSULE | Refills: 2 | Status: SHIPPED | OUTPATIENT
Start: 2022-08-08

## 2022-08-08 RX ORDER — AMLODIPINE BESYLATE AND BENAZEPRIL HYDROCHLORIDE 5; 20 MG/1; MG/1
1 CAPSULE ORAL DAILY
Qty: 30 CAPSULE | Refills: 2 | Status: SHIPPED | OUTPATIENT
Start: 2022-08-08 | End: 2022-11-16

## 2022-08-08 RX ORDER — PAROXETINE HYDROCHLORIDE 20 MG/1
10 TABLET, FILM COATED ORAL EVERY MORNING
Qty: 15 TABLET | Refills: 0 | Status: SHIPPED | OUTPATIENT
Start: 2022-08-08 | End: 2022-10-06 | Stop reason: SDUPTHER

## 2022-08-08 RX ORDER — ALBUTEROL SULFATE 90 UG/1
2 AEROSOL, METERED RESPIRATORY (INHALATION) EVERY 4 HOURS PRN
COMMUNITY
End: 2022-08-08 | Stop reason: SDUPTHER

## 2022-08-08 RX ORDER — POTASSIUM CHLORIDE 20 MEQ/1
20 TABLET, EXTENDED RELEASE ORAL 2 TIMES DAILY
Qty: 180 TABLET | Refills: 1 | Status: SHIPPED | OUTPATIENT
Start: 2022-08-08
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
## 2022-08-09 ENCOUNTER — TELEPHONE (OUTPATIENT)
Dept: FAMILY MEDICINE CLINIC | Facility: CLINIC | Age: 57
End: 2022-08-09

## 2022-08-09 LAB
25(OH)D3 SERPL-MCNC: 27.8 NG/ML (ref 30–100)
ALBUMIN SERPL-MCNC: 4.5 G/DL (ref 3.5–5.2)
ALBUMIN/GLOB SERPL: 2 G/DL
ALP SERPL-CCNC: 70 U/L (ref 39–117)
ALT SERPL W P-5'-P-CCNC: 35 U/L (ref 1–41)
ANION GAP SERPL CALCULATED.3IONS-SCNC: 11 MMOL/L (ref 5–15)
AST SERPL-CCNC: 28 U/L (ref 1–40)
BASOPHILS # BLD AUTO: 0.06 10*3/MM3 (ref 0–0.2)
BASOPHILS NFR BLD AUTO: 0.8 % (ref 0–1.5)
BILIRUB SERPL-MCNC: 0.4 MG/DL (ref 0–1.2)
BUN SERPL-MCNC: 14 MG/DL (ref 6–20)
BUN/CREAT SERPL: 15.9 (ref 7–25)
CALCIUM SPEC-SCNC: 9.3 MG/DL (ref 8.6–10.5)
CHLORIDE SERPL-SCNC: 107 MMOL/L (ref 98–107)
CHOLEST SERPL-MCNC: 210 MG/DL (ref 0–200)
CO2 SERPL-SCNC: 23 MMOL/L (ref 22–29)
CREAT SERPL-MCNC: 0.88 MG/DL (ref 0.76–1.27)
DEPRECATED RDW RBC AUTO: 43.5 FL (ref 37–54)
EGFRCR SERPLBLD CKD-EPI 2021: 99.1 ML/MIN/1.73
EOSINOPHIL # BLD AUTO: 0.23 10*3/MM3 (ref 0–0.4)
EOSINOPHIL NFR BLD AUTO: 3.2 % (ref 0.3–6.2)
ERYTHROCYTE [DISTWIDTH] IN BLOOD BY AUTOMATED COUNT: 13.8 % (ref 12.3–15.4)
GLOBULIN UR ELPH-MCNC: 2.3 GM/DL
GLUCOSE SERPL-MCNC: 74 MG/DL (ref 65–99)
HCT VFR BLD AUTO: 44.3 % (ref 37.5–51)
HDLC SERPL-MCNC: 24 MG/DL (ref 40–60)
HGB BLD-MCNC: 14.7 G/DL (ref 13–17.7)
IMM GRANULOCYTES # BLD AUTO: 0.08 10*3/MM3 (ref 0–0.05)
IMM GRANULOCYTES NFR BLD AUTO: 1.1 % (ref 0–0.5)
LDLC SERPL CALC-MCNC: 99 MG/DL (ref 0–100)
LDLC/HDLC SERPL: 3.45 {RATIO}
LYMPHOCYTES # BLD AUTO: 1.74 10*3/MM3 (ref 0.7–3.1)
LYMPHOCYTES NFR BLD AUTO: 24.5 % (ref 19.6–45.3)
MCH RBC QN AUTO: 28.6 PG (ref 26.6–33)
MCHC RBC AUTO-ENTMCNC: 33.2 G/DL (ref 31.5–35.7)
MCV RBC AUTO: 86.2 FL (ref 79–97)
MONOCYTES # BLD AUTO: 0.63 10*3/MM3 (ref 0.1–0.9)
MONOCYTES NFR BLD AUTO: 8.9 % (ref 5–12)
NEUTROPHILS NFR BLD AUTO: 4.36 10*3/MM3 (ref 1.7–7)
NEUTROPHILS NFR BLD AUTO: 61.5 % (ref 42.7–76)
NRBC BLD AUTO-RTO: 0 /100 WBC (ref 0–0.2)
PLATELET # BLD AUTO: 238 10*3/MM3 (ref 140–450)
PMV BLD AUTO: 10.3 FL (ref 6–12)
POTASSIUM SERPL-SCNC: 3.7 MMOL/L (ref 3.5–5.2)
PROT SERPL-MCNC: 6.8 G/DL (ref 6–8.5)
PSA SERPL-MCNC: 0.99 NG/ML (ref 0–4)
RBC # BLD AUTO: 5.14 10*6/MM3 (ref 4.14–5.8)
SODIUM SERPL-SCNC: 141 MMOL/L (ref 136–145)
TRIGL SERPL-MCNC: 516 MG/DL (ref 0–150)
TSH SERPL DL<=0.05 MIU/L-ACNC: 1.36 UIU/ML (ref 0.27–4.2)
VIT B12 BLD-MCNC: 426 PG/ML (ref 211–946)
VLDLC SERPL-MCNC: 87 MG/DL (ref 5–40)
WBC NRBC COR # BLD: 7.1 10*3/MM3 (ref 3.4–10.8)

## 2022-08-09 RX ORDER — OMEGA-3-ACID ETHYL ESTERS 1 G/1
2 CAPSULE, LIQUID FILLED ORAL 2 TIMES DAILY
Qty: 360 CAPSULE | Refills: 1 | Status: SHIPPED | OUTPATIENT
Start: 2022-08-09

## 2022-08-09 RX ORDER — ROSUVASTATIN CALCIUM 10 MG/1
10 TABLET, COATED ORAL DAILY
Qty: 90 TABLET | Refills: 1 | Status: SHIPPED | OUTPATIENT
Start: 2022-08-09

## 2022-08-09 NOTE — TELEPHONE ENCOUNTER
Contacted the pt and he stated he was fine with trying this. Also informed the pt to work on his diet and activity. Pt verbalized understanding.

## 2022-08-09 NOTE — TELEPHONE ENCOUNTER
----- Message from BJORN Lundberg sent at 8/9/2022  5:26 PM EDT -----  Lab ok except cholesterol overall is elevated with triglycerides back up to over 500.  Will he agree to a statin in addition to fenofibrate and adding lovaza bid

## 2022-08-12 LAB
TESTOST FREE SERPL-MCNC: 2.7 PG/ML (ref 7.2–24)
TESTOST SERPL-MCNC: 134 NG/DL (ref 264–916)

## 2022-08-16 ENCOUNTER — HOSPITAL ENCOUNTER (OUTPATIENT)
Dept: ULTRASOUND IMAGING | Facility: HOSPITAL | Age: 57
Discharge: HOME | End: 2022-08-16
Admitting: NURSE PRACTITIONER

## 2022-08-16 DIAGNOSIS — R59.0 ENLARGED LYMPH NODE IN NECK: ICD-10-CM

## 2022-08-16 PROCEDURE — 76536 US EXAM OF HEAD AND NECK: CPT | Performed by: RADIOLOGY

## 2022-08-16 PROCEDURE — 76536 US EXAM OF HEAD AND NECK: CPT

## 2022-08-17 ENCOUNTER — TELEPHONE (OUTPATIENT)
Dept: FAMILY MEDICINE CLINIC | Facility: CLINIC | Age: 57
End: 2022-08-17

## 2022-08-17 DIAGNOSIS — E29.1 HYPOGONADISM IN MALE: Primary | ICD-10-CM

## 2022-10-06 DIAGNOSIS — F32.A DEPRESSION, UNSPECIFIED DEPRESSION TYPE: ICD-10-CM

## 2022-10-06 NOTE — TELEPHONE ENCOUNTER
Caller: Zoie Magallanes    Relationship: Emergency Contact    Best call back number: [phone]    Requested Prescriptions:   Requested Prescriptions     Pending Prescriptions Disp Refills   • PARoxetine (PAXIL) 20 MG tablet 15 tablet 0     Sig: Take 0.5 tablets by mouth Every Morning.        Pharmacy where request should be sent: [facility] [address] [place] [address] 192 - [phone]  - [phone] FX     Additional details provided by patient:     Does the patient have less than a 3 day supply:  [x] Yes  [] No    Anthony Flores Rep   10/06/22 12:16 EDT

## 2022-10-10 RX ORDER — PAROXETINE HYDROCHLORIDE 20 MG/1
10 TABLET, FILM COATED ORAL EVERY MORNING
Qty: 15 TABLET | Refills: 1 | Status: SHIPPED | OUTPATIENT
Start: 2022-10-10 | End: 2023-01-16 | Stop reason: SDUPTHER

## 2022-11-08 ENCOUNTER — OFFICE VISIT (OUTPATIENT)
Dept: UROLOGY | Facility: CLINIC | Age: 57
End: 2022-11-08

## 2022-11-08 VITALS — BODY MASS INDEX: 27.44 KG/M2 | HEIGHT: 71 IN | WEIGHT: 196 LBS

## 2022-11-08 DIAGNOSIS — R79.89 LOW TESTOSTERONE: Primary | ICD-10-CM

## 2022-11-08 PROCEDURE — 99203 OFFICE O/P NEW LOW 30 MIN: CPT | Performed by: UROLOGY

## 2022-11-08 RX ORDER — TESTOSTERONE CYPIONATE 200 MG/ML
INJECTION, SOLUTION INTRAMUSCULAR
Qty: 10 ML | Refills: 2 | Status: SHIPPED | OUTPATIENT
Start: 2022-11-08

## 2022-11-08 NOTE — PROGRESS NOTES
"Chief Complaint:      Chief Complaint   Patient presents with   • Low Testosterone        HPI:   57 y.o. male returns today.  He was on testosterone but stopped it because his wife said he had anger issues.  He wants to restart it.  He has a positive JU-androgen deficiency in the age male questionnaire:   The patient was queried regarding the androgen deficiency in the age male questionnaire.  This is a validated questionnaire that was performed on a set of 314 [place] male physicians. When it was positive, it correlated directly with a 94% chance of low testosterone.  Patient indicates there is a decrease in libido or sex drive, a lack of energy, decreased  strength and endurance, a decreased \"enjoyment of life\", sad and grumpy feelings with significant difficulty maintaining erections. There has also been a recent deterioration regarding work performance.  Will restarted at half the dose per their request    Past Medical History:     Past Medical History:   Diagnosis Date   • Anxiety    • Bipolar disorder (HCC)    • Depression    • ED (erectile dysfunction)    • Hyperlipidemia    • Hypertension    • PTSD (post-traumatic stress disorder)    • Vitamin B12 deficiency        Current Meds:     Current Outpatient Medications   Medication Sig Dispense Refill   • albuterol sulfate  (90 Base) MCG/ACT inhaler Inhale 2 puffs Every 4 (Four) Hours As Needed for Wheezing. 18 g 5   • amLODIPine-benazepril (LOTREL 5-20) 5-20 MG per capsule Take 1 capsule by mouth Daily. 30 capsule 2   • fenofibrate (Tricor) 145 MG tablet Take 1 tablet by mouth Daily. 90 tablet 1   • losartan (COZAAR) 50 MG tablet Take 1.5 tablets by mouth Daily. For:  Hypertension. 135 tablet 1   • omega-3 acid ethyl esters (Lovaza) 1 g capsule Take 2 capsules by mouth 2 (Two) Times a Day. 360 capsule 1   • omeprazole (priLOSEC) 20 MG capsule Take 1 capsule by mouth Daily. For:  Esophageal reflux. 90 capsule 1   • PARoxetine (PAXIL) 20 MG tablet Take " 0.5 tablets by mouth Every Morning. 15 tablet 1   • potassium chloride (K-DUR,KLOR-CON) 20 MEQ CR tablet Take 1 tablet by mouth 2 (Two) Times a Day. 180 tablet 1   • rosuvastatin (Crestor) 10 MG tablet Take 1 tablet by mouth Daily. 90 tablet 1   • vitamin D (ERGOCALCIFEROL) 1.25 MG (17819 UT) capsule capsule Take 1 capsule by mouth 1 (One) Time Per Week. 12 capsule 2     No current facility-administered medications for this visit.        Allergies:      Allergies   Allergen Reactions   • Demerol Hcl [Meperidine]    • Levaquin [Levofloxacin]    • Morphine And Related    • Prozac [Fluoxetine Hcl]         Past Surgical History:     Past Surgical History:   Procedure Laterality Date   • ABDOMINAL SURGERY     • LEG SURGERY Right        Social History:     Social History     Socioeconomic History   • Marital status: Single   Tobacco Use   • Smoking status: Former     Packs/day: 1.50     Years: 15.00     Pack years: 22.50     Types: Cigarettes     Quit date:      Years since quittin.8   • Smokeless tobacco: Never   Vaping Use   • Vaping Use: Never used   Substance and Sexual Activity   • Alcohol use: No     Comment: stopped drinking alcohol   • Drug use: No   • Sexual activity: Defer       Family History:     Family History   Problem Relation Age of Onset   • Diabetes Mother    • Hypertension Mother    • Stroke Mother    • Heart disease Mother    • Rheum arthritis Father    • Heart disease Other    • Hypertension Other        Review of Systems:     Review of Systems   Constitutional: Positive for fatigue. Negative for chills and fever.   HENT: Negative.    Eyes: Negative.    Respiratory: Negative.  Negative for cough, shortness of breath and wheezing.    Cardiovascular: Negative.    Gastrointestinal: Negative.  Negative for abdominal pain, nausea and vomiting.   Endocrine: Negative.    Genitourinary: Negative for difficulty urinating and dysuria.   Musculoskeletal: Negative.  Negative for back pain.    Allergic/Immunologic: Negative.    Neurological: Negative.  Negative for dizziness, seizures and headaches.   Hematological: Negative.    Psychiatric/Behavioral: Negative.        Physical Exam:     Physical Exam  Vitals and nursing note reviewed.   Constitutional:       Appearance: He is well-developed.   HENT:      Head: Normocephalic and atraumatic.   Eyes:      Conjunctiva/sclera: Conjunctivae normal.      Pupils: Pupils are equal, round, and reactive to light.   Cardiovascular:      Rate and Rhythm: Normal rate and regular rhythm.      Heart sounds: Normal heart sounds.   Pulmonary:      Effort: Pulmonary effort is normal.      Breath sounds: Normal breath sounds.   Abdominal:      General: Bowel sounds are normal.      Palpations: Abdomen is soft.   Musculoskeletal:         General: Normal range of motion.      Cervical back: Normal range of motion.   Skin:     General: Skin is warm and dry.   Neurological:      Mental Status: He is alert and oriented to person, place, and time.      Deep Tendon Reflexes: Reflexes are normal and symmetric.   Psychiatric:         Behavior: Behavior normal.         Thought Content: Thought content normal.         Judgment: Judgment normal.         I have reviewed the following portions of the patient's history: Allergies, current medications, past family history, past medical history, past social history, past surgical history, problem list, and ROS and confirm it is accurate.    Recent Image (CT and/or KUB):      CT Abdomen and Pelvis: No results found for this or any previous visit.       CT Stone Protocol: Results for orders placed during the hospital encounter of 04/25/18    CT Abdomen Pelvis Stone Protocol    Narrative  CT ABDOMEN PELVIS STONE PROTOCOL-    CLINICAL INDICATION: Abdominal pain, unspecified      COMPARISON: None available    TECHNIQUE: Axial images were acquired from the lung bases through the  pubic symphysis without any IV or oral contrast.  Reformatted  images were created in both the coronal and sagittal planes.    Radiation dose reduction techniques were utilized per ALARA protocol.  Automated exposure control was initiated through either or CareDose or  DoseRight software packages by  protocol.      FINDINGS:  The lung bases are clear. There are no pleural effusions.    The liver is homogeneous. There is no evidence of focal hepatic mass    The spleen is homogeneous    There is no peripancreatic stranding or pancreatic head mass.    Left adrenal nodule poorly defined measuring 1.2 cm.    The kidneys show no evidence of hydronephrosis or hydroureter. I do not  see any distal ureteral stones.    Otherwise I do not see any free fluid or walled off fluid collections.    Moderate to large volume stool in the colon. Minimal stranding in the  left abdomen which is nonspecific. No adjacent bowel wall thickening or  fluid collections.    There is no evidence of mesenteric or retroperitoneal adenopathy    Impression  1. Small left adrenal nodule.  2. Moderate to large volume stool in the colon.          This report was finalized on 4/27/2018 11:09 AM by Dr. Lucio Laguna MD.       KUB: No results found for this or any previous visit.       Labs (past 3 months):      No visits with results within 3 Month(s) from this visit.   Latest known visit with results is:   Office Visit on 08/08/2022   Component Date Value Ref Range Status   • Glucose 08/08/2022 74  65 - 99 mg/dL Final   • BUN 08/08/2022 14  6 - 20 mg/dL Final   • Creatinine 08/08/2022 0.88  0.76 - 1.27 mg/dL Final   • Sodium 08/08/2022 141  136 - 145 mmol/L Final   • Potassium 08/08/2022 3.7  3.5 - 5.2 mmol/L Final   • Chloride 08/08/2022 107  98 - 107 mmol/L Final   • CO2 08/08/2022 23.0  22.0 - 29.0 mmol/L Final   • Calcium 08/08/2022 9.3  8.6 - 10.5 mg/dL Final   • Total Protein 08/08/2022 6.8  6.0 - 8.5 g/dL Final   • Albumin 08/08/2022 4.50  3.50 - 5.20 g/dL Final   • ALT (SGPT) 08/08/2022 35  1 -  41 U/L Final   • AST (SGOT) 08/08/2022 28  1 - 40 U/L Final   • Alkaline Phosphatase 08/08/2022 70  39 - 117 U/L Final   • Total Bilirubin 08/08/2022 0.4  0.0 - 1.2 mg/dL Final   • Globulin 08/08/2022 2.3  gm/dL Final   • A/G Ratio 08/08/2022 2.0  g/dL Final   • BUN/Creatinine Ratio 08/08/2022 15.9  7.0 - 25.0 Final   • Anion Gap 08/08/2022 11.0  5.0 - 15.0 mmol/L Final   • eGFR 08/08/2022 99.1  >60.0 mL/min/1.73 Final    National Kidney Foundation and American Society of Nephrology (ASN) Task Force recommended calculation based on the Chronic Kidney Disease Epidemiology Collaboration (CKD-EPI) equation refit without adjustment for race.   • Total Cholesterol 08/08/2022 210 (H)  0 - 200 mg/dL Final   • Triglycerides 08/08/2022 516 (H)  0 - 150 mg/dL Final   • HDL Cholesterol 08/08/2022 24 (L)  40 - 60 mg/dL Final   • LDL Cholesterol  08/08/2022 99  0 - 100 mg/dL Final   • VLDL Cholesterol 08/08/2022 87 (H)  5 - 40 mg/dL Final   • LDL/HDL Ratio 08/08/2022 3.45   Final   • TSH 08/08/2022 1.360  0.270 - 4.200 uIU/mL Final   • 25 Hydroxy, Vitamin D 08/08/2022 27.8 (L)  30.0 - 100.0 ng/ml Final   • Vitamin B-12 08/08/2022 426  211 - 946 pg/mL Final   • Testosterone, Total 08/08/2022 134 (L)  264 - 916 ng/dL Final    Adult male reference interval is based on a population of  healthy nonobese males (BMI <30) between 19 and 39 years old.  Fortunato et.al. JCEM 2017,102;1689-8459. PMID: 27321020.   • Testosterone, Free 08/08/2022 2.7 (L)  7.2 - 24.0 pg/mL Final   • PSA 08/08/2022 0.987  0.000 - 4.000 ng/mL Final   • WBC 08/08/2022 7.10  3.40 - 10.80 10*3/mm3 Final   • RBC 08/08/2022 5.14  4.14 - 5.80 10*6/mm3 Final   • Hemoglobin 08/08/2022 14.7  13.0 - 17.7 g/dL Final   • Hematocrit 08/08/2022 44.3  37.5 - 51.0 % Final   • MCV 08/08/2022 86.2  79.0 - 97.0 fL Final   • MCH 08/08/2022 28.6  26.6 - 33.0 pg Final   • MCHC 08/08/2022 33.2  31.5 - 35.7 g/dL Final   • RDW 08/08/2022 13.8  12.3 - 15.4 % Final   • RDW-SD 08/08/2022  43.5  37.0 - 54.0 fl Final   • MPV 08/08/2022 10.3  6.0 - 12.0 fL Final   • Platelets 08/08/2022 238  140 - 450 10*3/mm3 Final   • Neutrophil % 08/08/2022 61.5  42.7 - 76.0 % Final   • Lymphocyte % 08/08/2022 24.5  19.6 - 45.3 % Final   • Monocyte % 08/08/2022 8.9  5.0 - 12.0 % Final   • Eosinophil % 08/08/2022 3.2  0.3 - 6.2 % Final   • Basophil % 08/08/2022 0.8  0.0 - 1.5 % Final   • Immature Grans % 08/08/2022 1.1 (H)  0.0 - 0.5 % Final   • Neutrophils, Absolute 08/08/2022 4.36  1.70 - 7.00 10*3/mm3 Final   • Lymphocytes, Absolute 08/08/2022 1.74  0.70 - 3.10 10*3/mm3 Final   • Monocytes, Absolute 08/08/2022 0.63  0.10 - 0.90 10*3/mm3 Final   • Eosinophils, Absolute 08/08/2022 0.23  0.00 - 0.40 10*3/mm3 Final   • Basophils, Absolute 08/08/2022 0.06  0.00 - 0.20 10*3/mm3 Final   • Immature Grans, Absolute 08/08/2022 0.08 (H)  0.00 - 0.05 10*3/mm3 Final   • nRBC 08/08/2022 0.0  0.0 - 0.2 /100 WBC Final        Procedure:       Assessment/Plan:   Low Testosterone: This pleasant male patient presents today with signs and symptoms that are consistent with low testosterone. He has positive Corky questionnaire by history, this includes both the sexual and nonsexual side effects.  Sexual side effects include inability to achieve and maintain an erection, inability to maintain his erection, and decreased interest in sexual activity.  Nonsexual symptomatology includes fatigue, difficulty completing a job, and tiredness.  We had a discussion of the various forms of testosterone available including parenteral, topical, and the form of a patch.  We discussed the efficacy of the gels and the injections, as well as the cost and benefits analysis.  We discussed the studies and talked about heart disease and its effect on prostate cancer, both of which are negligible.  He gave verbal consent to proceed with treatment.  He understands the risks and benefits of length.  He also completed his attempts at fertility. He understands the  partial effect on spermatogenesis.  We will restart at half the dose              This document has been electronically signed by ROBERT GUTIERREZ MD November 8, 2022 14:50 EST    Dictated Utilizing Dragon Dictation: Part of this note may be an electronic transcription/translation of spoken language to printed text using the Dragon Dictation System.

## 2022-11-09 ENCOUNTER — TELEPHONE (OUTPATIENT)
Dept: UROLOGY | Facility: CLINIC | Age: 57
End: 2022-11-09

## 2022-11-10 DIAGNOSIS — K21.9 GASTROESOPHAGEAL REFLUX DISEASE: ICD-10-CM

## 2022-11-10 RX ORDER — OMEPRAZOLE 20 MG/1
CAPSULE, DELAYED RELEASE ORAL
Qty: 90 CAPSULE | Refills: 1 | OUTPATIENT
Start: 2022-11-10

## 2022-11-13 DIAGNOSIS — I10 ESSENTIAL HYPERTENSION: ICD-10-CM

## 2022-11-16 RX ORDER — AMLODIPINE BESYLATE AND BENAZEPRIL HYDROCHLORIDE 5; 20 MG/1; MG/1
1 CAPSULE ORAL DAILY
Qty: 30 CAPSULE | Refills: 2 | Status: SHIPPED | OUTPATIENT
Start: 2022-11-16

## 2023-01-13 ENCOUNTER — TELEPHONE (OUTPATIENT)
Dept: FAMILY MEDICINE CLINIC | Facility: CLINIC | Age: 58
End: 2023-01-13
Payer: MEDICARE

## 2023-01-13 NOTE — TELEPHONE ENCOUNTER
Caller: Zoie Magallanes    Relationship: Emergency Contact    Best call back number: [phone]     What medication are you requesting: Z PACK AND PREDNISONE     What are your current symptoms: STUFFY NOSE, SNEEZING, SINUS BLOCKAGE    How long have you been experiencing symptoms: THIS WEEK    Have you had these symptoms before:    [x] Yes  [] No    Have you been treated for these symptoms before:   [x] Yes  [] No    If a prescription is needed, what is your preferred pharmacy and phone number: [facility] #[id number] [address] [facility] [address] - [phone]  - [phone] FX      Zoie notified that he had an appointment today that he canceled,would need an evaluation for medications,she verbalized understanding.

## 2023-01-13 NOTE — TELEPHONE ENCOUNTER
Caller: Zoie Magallanes    Relationship: Emergency Contact    Best call back number: [phone]     What medication are you requesting: Z PACK AND PREDNISONE     What are your current symptoms: STUFFY NOSE, SNEEZING, SINUS BLOCKAGE    How long have you been experiencing symptoms: THIS WEEK    Have you had these symptoms before:    [x] Yes  [] No    Have you been treated for these symptoms before:   [x] Yes  [] No    If a prescription is needed, what is your preferred pharmacy and phone number: [email or web address] #[id number] [address] [facility] [address] - [phone]  - [phone] FX

## 2023-01-16 DIAGNOSIS — F32.A DEPRESSION, UNSPECIFIED DEPRESSION TYPE: ICD-10-CM

## 2023-01-16 RX ORDER — PAROXETINE HYDROCHLORIDE 20 MG/1
10 TABLET, FILM COATED ORAL EVERY MORNING
Qty: 15 TABLET | Refills: 1 | Status: SHIPPED | OUTPATIENT
Start: 2023-01-16

## 2023-01-16 NOTE — TELEPHONE ENCOUNTER
Caller: Marilee Taylor    Relationship: Self    Best call back number: [phone]    Requested Prescriptions:   Requested Prescriptions     Pending Prescriptions Disp Refills   • PARoxetine (PAXIL) 20 MG tablet 15 tablet 1     Sig: Take 0.5 tablets by mouth Every Morning.        Pharmacy where request should be sent: [facility] [address] [place] [address] [phone]  - [phone]     Additional details provided by patient:     Does the patient have less than a 3 day supply:  [x] Yes  [] No    SiAnthony Post Rep   01/16/23 15:30 EST

## 2024-02-15 NOTE — TELEPHONE ENCOUNTER
----- Message from BJORN Lundberg sent at 8/17/2022 10:20 AM EDT -----  I don't see a response regarding starting a statin from previous labs.  Let him know testosterone is low would he like to follow with urology for management      Patient notified and would like to proceed with referral.  Previous phone encounter addressed statin and was prescribed.     
No

## 2025-05-29 NOTE — TELEPHONE ENCOUNTER
I'VE TRIED NUMEROUS TIMES TO GET IN TOUCH WITH PATIENT ABOUT HIS MISSED APPT. WE SPOKE WITH A FEMALE ONCE WHO SAID SHE WILL GIVE HIM THE MESSAGE TO RETURN THE CALL.    (3) no apparent problem